# Patient Record
Sex: FEMALE | Race: WHITE | NOT HISPANIC OR LATINO | Employment: UNEMPLOYED | URBAN - NONMETROPOLITAN AREA
[De-identification: names, ages, dates, MRNs, and addresses within clinical notes are randomized per-mention and may not be internally consistent; named-entity substitution may affect disease eponyms.]

---

## 2021-08-16 ENCOUNTER — TRANSCRIBE ORDERS (OUTPATIENT)
Dept: ADMINISTRATIVE | Facility: HOSPITAL | Age: 56
End: 2021-08-16

## 2021-08-16 DIAGNOSIS — N17.9 ACUTE RENAL FAILURE, UNSPECIFIED ACUTE RENAL FAILURE TYPE (HCC): ICD-10-CM

## 2021-08-16 DIAGNOSIS — R60.1 ANASARCA: Primary | ICD-10-CM

## 2022-07-05 ENCOUNTER — APPOINTMENT (OUTPATIENT)
Dept: GENERAL RADIOLOGY | Facility: HOSPITAL | Age: 57
End: 2022-07-05

## 2022-11-10 ENCOUNTER — APPOINTMENT (OUTPATIENT)
Dept: CT IMAGING | Facility: HOSPITAL | Age: 57
End: 2022-11-10

## 2022-11-10 ENCOUNTER — APPOINTMENT (OUTPATIENT)
Dept: GENERAL RADIOLOGY | Facility: HOSPITAL | Age: 57
End: 2022-11-10

## 2022-11-10 ENCOUNTER — HOSPITAL ENCOUNTER (INPATIENT)
Facility: HOSPITAL | Age: 57
LOS: 8 days | Discharge: HOME HEALTH | End: 2022-11-18
Attending: STUDENT IN AN ORGANIZED HEALTH CARE EDUCATION/TRAINING PROGRAM | Admitting: HOSPITALIST

## 2022-11-10 DIAGNOSIS — M86.60 CHRONIC OSTEOMYELITIS: ICD-10-CM

## 2022-11-10 DIAGNOSIS — N18.9 ACUTE RENAL FAILURE WITH ACUTE TUBULAR NECROSIS SUPERIMPOSED ON CHRONIC KIDNEY DISEASE, UNSPECIFIED CKD STAGE: ICD-10-CM

## 2022-11-10 DIAGNOSIS — I89.0 CHRONIC ACQUIRED LYMPHEDEMA: ICD-10-CM

## 2022-11-10 DIAGNOSIS — N17.0 ACUTE RENAL FAILURE WITH ACUTE TUBULAR NECROSIS SUPERIMPOSED ON CHRONIC KIDNEY DISEASE, UNSPECIFIED CKD STAGE: ICD-10-CM

## 2022-11-10 DIAGNOSIS — N19 RENAL FAILURE, UNSPECIFIED CHRONICITY: Primary | ICD-10-CM

## 2022-11-10 PROBLEM — N17.9 ACUTE ON CHRONIC RENAL FAILURE (HCC): Status: ACTIVE | Noted: 2022-11-10

## 2022-11-10 LAB
ALBUMIN SERPL-MCNC: 2.68 G/DL (ref 3.5–5.2)
ALBUMIN/GLOB SERPL: 0.7 G/DL
ALP SERPL-CCNC: 134 U/L (ref 39–117)
ALT SERPL W P-5'-P-CCNC: 9 U/L (ref 1–33)
ANION GAP SERPL CALCULATED.3IONS-SCNC: 13.1 MMOL/L (ref 5–15)
APTT PPP: 33.4 SECONDS (ref 26.5–34.5)
AST SERPL-CCNC: 22 U/L (ref 1–32)
BASOPHILS # BLD MANUAL: 0.13 10*3/MM3 (ref 0–0.2)
BASOPHILS NFR BLD MANUAL: 1 % (ref 0–1.5)
BILIRUB SERPL-MCNC: 0.8 MG/DL (ref 0–1.2)
BILIRUB UR QL STRIP: NEGATIVE
BUN SERPL-MCNC: 67 MG/DL (ref 6–20)
BUN/CREAT SERPL: 15.2 (ref 7–25)
CALCIUM SPEC-SCNC: 9.1 MG/DL (ref 8.6–10.5)
CHLORIDE SERPL-SCNC: 94 MMOL/L (ref 98–107)
CLARITY UR: CLEAR
CO2 SERPL-SCNC: 25.9 MMOL/L (ref 22–29)
COLOR UR: YELLOW
CREAT SERPL-MCNC: 4.41 MG/DL (ref 0.57–1)
CRP SERPL-MCNC: 20.13 MG/DL (ref 0–0.5)
D-LACTATE SERPL-SCNC: 4.2 MMOL/L (ref 0.5–2)
D-LACTATE SERPL-SCNC: 5 MMOL/L (ref 0.5–2)
D-LACTATE SERPL-SCNC: 5.1 MMOL/L (ref 0.5–2)
DEPRECATED RDW RBC AUTO: 52.3 FL (ref 37–54)
EGFRCR SERPLBLD CKD-EPI 2021: 11.1 ML/MIN/1.73
ERYTHROCYTE [DISTWIDTH] IN BLOOD BY AUTOMATED COUNT: 14.6 % (ref 12.3–15.4)
FLUAV RNA RESP QL NAA+PROBE: NOT DETECTED
FLUBV RNA RESP QL NAA+PROBE: NOT DETECTED
GLOBULIN UR ELPH-MCNC: 4 GM/DL
GLUCOSE SERPL-MCNC: 114 MG/DL (ref 65–99)
GLUCOSE UR STRIP-MCNC: NEGATIVE MG/DL
HBA1C MFR BLD: 4.7 % (ref 4.8–5.6)
HCT VFR BLD AUTO: 29.4 % (ref 34–46.6)
HGB BLD-MCNC: 9.5 G/DL (ref 12–15.9)
HGB UR QL STRIP.AUTO: NEGATIVE
HOLD SPECIMEN: NORMAL
HOLD SPECIMEN: NORMAL
HYPOCHROMIA BLD QL: ABNORMAL
INR PPP: 1.12 (ref 0.9–1.1)
KETONES UR QL STRIP: NEGATIVE
LEUKOCYTE ESTERASE UR QL STRIP.AUTO: NEGATIVE
LIPASE SERPL-CCNC: 28 U/L (ref 13–60)
LYMPHOCYTES # BLD MANUAL: 0.4 10*3/MM3 (ref 0.7–3.1)
LYMPHOCYTES NFR BLD MANUAL: 5 % (ref 5–12)
MACROCYTES BLD QL SMEAR: ABNORMAL
MCH RBC QN AUTO: 31.6 PG (ref 26.6–33)
MCHC RBC AUTO-ENTMCNC: 32.3 G/DL (ref 31.5–35.7)
MCV RBC AUTO: 97.7 FL (ref 79–97)
METAMYELOCYTES NFR BLD MANUAL: 2 % (ref 0–0)
MONOCYTES # BLD: 0.67 10*3/MM3 (ref 0.1–0.9)
NEUTROPHILS # BLD AUTO: 11.88 10*3/MM3 (ref 1.7–7)
NEUTROPHILS NFR BLD MANUAL: 87 % (ref 42.7–76)
NEUTS BAND NFR BLD MANUAL: 2 % (ref 0–5)
NEUTS VAC BLD QL SMEAR: ABNORMAL
NITRITE UR QL STRIP: NEGATIVE
PH UR STRIP.AUTO: <=5 [PH] (ref 5–8)
PLAT MORPH BLD: NORMAL
PLATELET # BLD AUTO: 56 10*3/MM3 (ref 140–450)
PMV BLD AUTO: 10.2 FL (ref 6–12)
POTASSIUM SERPL-SCNC: 5.2 MMOL/L (ref 3.5–5.2)
PROCALCITONIN SERPL-MCNC: 1.13 NG/ML (ref 0–0.25)
PROT SERPL-MCNC: 6.7 G/DL (ref 6–8.5)
PROT UR QL STRIP: NEGATIVE
PROTHROMBIN TIME: 14.7 SECONDS (ref 12.1–14.7)
RBC # BLD AUTO: 3.01 10*6/MM3 (ref 3.77–5.28)
SARS-COV-2 RNA RESP QL NAA+PROBE: NOT DETECTED
SODIUM SERPL-SCNC: 133 MMOL/L (ref 136–145)
SP GR UR STRIP: 1.01 (ref 1–1.03)
TOXIC GRANULATION: ABNORMAL
TROPONIN T SERPL-MCNC: 0.02 NG/ML (ref 0–0.03)
TSH SERPL DL<=0.05 MIU/L-ACNC: 0.96 UIU/ML (ref 0.27–4.2)
UROBILINOGEN UR QL STRIP: NORMAL
VARIANT LYMPHS NFR BLD MANUAL: 3 % (ref 19.6–45.3)
WBC NRBC COR # BLD: 13.35 10*3/MM3 (ref 3.4–10.8)
WHOLE BLOOD HOLD COAG: NORMAL

## 2022-11-10 PROCEDURE — 84443 ASSAY THYROID STIM HORMONE: CPT | Performed by: HOSPITALIST

## 2022-11-10 PROCEDURE — 85610 PROTHROMBIN TIME: CPT | Performed by: PHYSICIAN ASSISTANT

## 2022-11-10 PROCEDURE — 25010000002 ONDANSETRON PER 1 MG: Performed by: EMERGENCY MEDICINE

## 2022-11-10 PROCEDURE — 99285 EMERGENCY DEPT VISIT HI MDM: CPT

## 2022-11-10 PROCEDURE — 80053 COMPREHEN METABOLIC PANEL: CPT | Performed by: PHYSICIAN ASSISTANT

## 2022-11-10 PROCEDURE — 73700 CT LOWER EXTREMITY W/O DYE: CPT

## 2022-11-10 PROCEDURE — 81003 URINALYSIS AUTO W/O SCOPE: CPT | Performed by: PHYSICIAN ASSISTANT

## 2022-11-10 PROCEDURE — 83605 ASSAY OF LACTIC ACID: CPT | Performed by: PHYSICIAN ASSISTANT

## 2022-11-10 PROCEDURE — 36415 COLL VENOUS BLD VENIPUNCTURE: CPT

## 2022-11-10 PROCEDURE — 85007 BL SMEAR W/DIFF WBC COUNT: CPT | Performed by: PHYSICIAN ASSISTANT

## 2022-11-10 PROCEDURE — 83036 HEMOGLOBIN GLYCOSYLATED A1C: CPT | Performed by: HOSPITALIST

## 2022-11-10 PROCEDURE — 25010000002 VANCOMYCIN 5 G RECONSTITUTED SOLUTION: Performed by: PHYSICIAN ASSISTANT

## 2022-11-10 PROCEDURE — P9612 CATHETERIZE FOR URINE SPEC: HCPCS

## 2022-11-10 PROCEDURE — 93010 ELECTROCARDIOGRAM REPORT: CPT | Performed by: INTERNAL MEDICINE

## 2022-11-10 PROCEDURE — 71250 CT THORAX DX C-: CPT

## 2022-11-10 PROCEDURE — 71045 X-RAY EXAM CHEST 1 VIEW: CPT

## 2022-11-10 PROCEDURE — 74176 CT ABD & PELVIS W/O CONTRAST: CPT

## 2022-11-10 PROCEDURE — 99223 1ST HOSP IP/OBS HIGH 75: CPT | Performed by: PHYSICIAN ASSISTANT

## 2022-11-10 PROCEDURE — 85025 COMPLETE CBC W/AUTO DIFF WBC: CPT | Performed by: PHYSICIAN ASSISTANT

## 2022-11-10 PROCEDURE — 25010000002 HYDROMORPHONE 1 MG/ML SOLUTION: Performed by: EMERGENCY MEDICINE

## 2022-11-10 PROCEDURE — 93005 ELECTROCARDIOGRAM TRACING: CPT | Performed by: PHYSICIAN ASSISTANT

## 2022-11-10 PROCEDURE — 84145 PROCALCITONIN (PCT): CPT | Performed by: PHYSICIAN ASSISTANT

## 2022-11-10 PROCEDURE — 83690 ASSAY OF LIPASE: CPT | Performed by: HOSPITALIST

## 2022-11-10 PROCEDURE — 25010000002 MEROPENEM PER 100 MG: Performed by: HOSPITALIST

## 2022-11-10 PROCEDURE — 87636 SARSCOV2 & INF A&B AMP PRB: CPT | Performed by: PHYSICIAN ASSISTANT

## 2022-11-10 PROCEDURE — 85730 THROMBOPLASTIN TIME PARTIAL: CPT | Performed by: PHYSICIAN ASSISTANT

## 2022-11-10 PROCEDURE — 84484 ASSAY OF TROPONIN QUANT: CPT | Performed by: PHYSICIAN ASSISTANT

## 2022-11-10 PROCEDURE — 86140 C-REACTIVE PROTEIN: CPT | Performed by: PHYSICIAN ASSISTANT

## 2022-11-10 PROCEDURE — 87040 BLOOD CULTURE FOR BACTERIA: CPT | Performed by: PHYSICIAN ASSISTANT

## 2022-11-10 PROCEDURE — 71045 X-RAY EXAM CHEST 1 VIEW: CPT | Performed by: RADIOLOGY

## 2022-11-10 RX ORDER — OXYCODONE AND ACETAMINOPHEN 10; 325 MG/1; MG/1
1 TABLET ORAL EVERY 6 HOURS PRN
Status: ON HOLD | COMMUNITY
End: 2022-11-11

## 2022-11-10 RX ORDER — SODIUM CHLORIDE 0.9 % (FLUSH) 0.9 %
10 SYRINGE (ML) INJECTION AS NEEDED
Status: DISCONTINUED | OUTPATIENT
Start: 2022-11-10 | End: 2022-11-18 | Stop reason: HOSPADM

## 2022-11-10 RX ORDER — HYDRALAZINE HYDROCHLORIDE 10 MG/1
10 TABLET, FILM COATED ORAL 3 TIMES DAILY
Status: ON HOLD | COMMUNITY
End: 2022-11-11

## 2022-11-10 RX ORDER — BUMETANIDE 2 MG/1
2 TABLET ORAL 2 TIMES DAILY
Status: ON HOLD | COMMUNITY
End: 2022-11-17 | Stop reason: SDUPTHER

## 2022-11-10 RX ORDER — SODIUM CHLORIDE 0.9 % (FLUSH) 0.9 %
10 SYRINGE (ML) INJECTION EVERY 12 HOURS SCHEDULED
Status: DISCONTINUED | OUTPATIENT
Start: 2022-11-10 | End: 2022-11-18 | Stop reason: HOSPADM

## 2022-11-10 RX ORDER — HEPARIN SODIUM 5000 [USP'U]/ML
5000 INJECTION, SOLUTION INTRAVENOUS; SUBCUTANEOUS EVERY 12 HOURS SCHEDULED
Status: DISCONTINUED | OUTPATIENT
Start: 2022-11-10 | End: 2022-11-18 | Stop reason: HOSPADM

## 2022-11-10 RX ORDER — LEVOTHYROXINE SODIUM 0.03 MG/1
25 TABLET ORAL DAILY
Status: ON HOLD | COMMUNITY
End: 2022-11-11

## 2022-11-10 RX ORDER — TRAZODONE HYDROCHLORIDE 50 MG/1
50 TABLET ORAL NIGHTLY PRN
COMMUNITY

## 2022-11-10 RX ORDER — METOPROLOL SUCCINATE 25 MG/1
25 TABLET, EXTENDED RELEASE ORAL DAILY
Status: ON HOLD | COMMUNITY
End: 2022-11-11

## 2022-11-10 RX ORDER — SODIUM CHLORIDE 9 MG/ML
125 INJECTION, SOLUTION INTRAVENOUS CONTINUOUS
Status: DISCONTINUED | OUTPATIENT
Start: 2022-11-10 | End: 2022-11-11

## 2022-11-10 RX ORDER — ONDANSETRON 2 MG/ML
4 INJECTION INTRAMUSCULAR; INTRAVENOUS ONCE
Status: COMPLETED | OUTPATIENT
Start: 2022-11-10 | End: 2022-11-10

## 2022-11-10 RX ORDER — NITROGLYCERIN 0.4 MG/1
0.4 TABLET SUBLINGUAL
Status: DISCONTINUED | OUTPATIENT
Start: 2022-11-10 | End: 2022-11-18 | Stop reason: HOSPADM

## 2022-11-10 RX ADMIN — VANCOMYCIN HYDROCHLORIDE 2500 MG: 5 INJECTION, POWDER, LYOPHILIZED, FOR SOLUTION INTRAVENOUS at 16:14

## 2022-11-10 RX ADMIN — MEROPENEM 1 G: 1 INJECTION, POWDER, FOR SOLUTION INTRAVENOUS at 20:08

## 2022-11-10 RX ADMIN — ONDANSETRON 4 MG: 2 INJECTION INTRAMUSCULAR; INTRAVENOUS at 16:41

## 2022-11-10 RX ADMIN — SODIUM CHLORIDE 2055 ML: 9 INJECTION, SOLUTION INTRAVENOUS at 18:34

## 2022-11-10 RX ADMIN — SODIUM CHLORIDE 2 G: 9 INJECTION, SOLUTION INTRAVENOUS at 18:44

## 2022-11-10 RX ADMIN — SODIUM CHLORIDE 125 ML/HR: 9 INJECTION, SOLUTION INTRAVENOUS at 20:08

## 2022-11-10 RX ADMIN — HYDROMORPHONE HYDROCHLORIDE 1 MG: 1 INJECTION, SOLUTION INTRAMUSCULAR; INTRAVENOUS; SUBCUTANEOUS at 16:41

## 2022-11-10 NOTE — ED PROVIDER NOTES
Subjective   History of Present Illness  Pt states pcp Dr Jacobson sent patient to hospital for Creatinine of 4.09   MRSA knee treated years ago   Pt is bed bound b/c she she lost her right knee joint due to MRSA   Pt takes care of multiple grand kids and they had a virus a couple of weeks ago   Pt had diarrhea over the weekend   Patient has a history of morbid obesity, COPD, hypertension, peripheral edema, hepatitis C.  Patient denies any rashes or decubitus ulcers.          History provided by:  Patient   used: No    Weakness - Generalized  Severity:  Moderate  Onset quality:  Sudden  Duration:  1 day  Timing:  Constant  Progression:  Worsening  Chronicity:  New  Relieved by:  None tried  Ineffective treatments:  None tried  Associated symptoms: no abdominal pain, no chest pain, no cough, no diarrhea, no dysuria, no fever, no headaches, no nausea, no shortness of breath and no vomiting        Review of Systems   Constitutional: Positive for activity change. Negative for chills and fever.   HENT: Negative.  Negative for congestion, ear pain and sore throat.    Respiratory: Negative.  Negative for cough, shortness of breath and wheezing.    Cardiovascular: Negative.  Negative for chest pain.   Gastrointestinal: Negative.  Negative for abdominal pain, diarrhea, nausea and vomiting.   Endocrine: Negative.    Genitourinary: Negative.  Negative for dysuria and flank pain.   Skin: Negative.  Negative for rash.   Neurological: Negative for headaches.   Psychiatric/Behavioral: Negative.  The patient is not nervous/anxious.    All other systems reviewed and are negative.      Past Medical History:   Diagnosis Date   • CHF (congestive heart failure) ([facility])    • COPD (chronic obstructive pulmonary disease) ([facility])    • Edema    • Hep C w/o coma, chronic ([facility])    • Hypertension    • Mitral valve prolapse        Allergies   Allergen Reactions   • Sulfa Antibiotics Rash   • Nsaids Swelling   • Penicillins Rash        Past Surgical History:   Procedure Laterality Date   • GASTRIC BANDING     • JOINT ASPIRATION AND/OR INJECTION Right 07/2022       Family History   Problem Relation Age of Onset   • Hypertension Mother    • Breast cancer Mother    • COPD Father        Social History     Socioeconomic History   • Marital status:    Tobacco Use   • Smoking status: Never   Vaping Use   • Vaping Use: Never used   Substance and Sexual Activity   • Alcohol use: Not Currently   • Drug use: Never   • Sexual activity: Defer           Objective   Physical Exam  Vitals and nursing note reviewed.   Constitutional:       Appearance: She is well-developed. She is obese.   HENT:      Head: Normocephalic.   Cardiovascular:      Rate and Rhythm: Normal rate and regular rhythm.   Pulmonary:      Effort: Pulmonary effort is normal.      Breath sounds: Normal breath sounds.   Abdominal:      General: Bowel sounds are normal.      Palpations: Abdomen is soft.      Tenderness: There is no abdominal tenderness.   Musculoskeletal:      Comments: Pt is non ambulatory/bed bound    Skin:     General: Skin is warm and dry.   Neurological:      Mental Status: She is alert and oriented to person, place, and time.   Psychiatric:         Behavior: Behavior normal.         Thought Content: Thought content normal.         Judgment: Judgment normal.         ECG 12 Lead Other; Sepsis      Date/Time: 11/10/2022 3:38 PM  Performed by: Joyce Lindo PA  Authorized by: Joyce Lindo PA                    ED Course  ED Course as of 11/12/22 0909   Thu Nov 10, 2022   1613 EKG at 1604 NSR 89 bpm, , cures 106, QTc 476, regular axis, no significant ST deviation, no STEMI.  Poor R wave progression. [KP]   1808 Discussed with Dr Butcher and states to hold bumex   For patient to get /hr after bolus  [LC]   1933 Discussed with Dr Licea will admit  [LC]      ED Course User Index  [KP] Carlos Manuel Mckeon MD  [LC] Joyce Lindo PA                                            MDM    Final diagnoses:   Renal failure, unspecified chronicity       ED Disposition  ED Disposition     ED Disposition   Decision to Admit    Condition   --    Comment   Level of Care: Progressive Care [20]   Diagnosis: Acute on chronic renal failure (HCC) [072154]   Admitting Physician: TRISTEN DIETRICH [[id number]]   Attending Physician: TRISTEN DIETRICH [[id number]]   Certification: I Certify That Inpatient Hospital Services Are Medically Necessary For Greater Than 2 Midnights               No follow-up provider specified.       Medication List      ASK your doctor about these medications    levothyroxine 100 MCG tablet  Commonly known as: SYNTHROID, LEVOTHROID  Ask about: Which instructions should I use?             Joyce Lindo PA  11/12/22 0967

## 2022-11-11 ENCOUNTER — APPOINTMENT (OUTPATIENT)
Dept: CARDIOLOGY | Facility: HOSPITAL | Age: 57
End: 2022-11-11

## 2022-11-11 ENCOUNTER — APPOINTMENT (OUTPATIENT)
Dept: ULTRASOUND IMAGING | Facility: HOSPITAL | Age: 57
End: 2022-11-11

## 2022-11-11 LAB
ADV 40+41 DNA STL QL NAA+NON-PROBE: NOT DETECTED
ALBUMIN SERPL-MCNC: 1.58 G/DL (ref 3.5–5.2)
ALBUMIN SERPL-MCNC: 2.11 G/DL (ref 3.5–5.2)
ALBUMIN/GLOB SERPL: 0.4 G/DL
ALBUMIN/GLOB SERPL: 0.5 G/DL
ALP SERPL-CCNC: 101 U/L (ref 39–117)
ALP SERPL-CCNC: 116 U/L (ref 39–117)
ALT SERPL W P-5'-P-CCNC: 10 U/L (ref 1–33)
ALT SERPL W P-5'-P-CCNC: 11 U/L (ref 1–33)
AMPHET+METHAMPHET UR QL: NEGATIVE
AMPHETAMINES UR QL: NEGATIVE
ANION GAP SERPL CALCULATED.3IONS-SCNC: 15.8 MMOL/L (ref 5–15)
ANION GAP SERPL CALCULATED.3IONS-SCNC: 16 MMOL/L (ref 5–15)
AST SERPL-CCNC: 25 U/L (ref 1–32)
AST SERPL-CCNC: 34 U/L (ref 1–32)
ASTRO TYP 1-8 RNA STL QL NAA+NON-PROBE: NOT DETECTED
BACTERIA UR QL AUTO: NORMAL /HPF
BARBITURATES UR QL SCN: NEGATIVE
BASOPHILS # BLD AUTO: 0.03 10*3/MM3 (ref 0–0.2)
BASOPHILS # BLD AUTO: 0.04 10*3/MM3 (ref 0–0.2)
BASOPHILS NFR BLD AUTO: 0.3 % (ref 0–1.5)
BASOPHILS NFR BLD AUTO: 0.4 % (ref 0–1.5)
BENZODIAZ UR QL SCN: NEGATIVE
BH CV ECHO MEAS - ACS: 1.9 CM
BH CV ECHO MEAS - AO MAX PG: 25.9 MMHG
BH CV ECHO MEAS - AO MEAN PG: 15.5 MMHG
BH CV ECHO MEAS - AO ROOT DIAM: 3.1 CM
BH CV ECHO MEAS - AO V2 MAX: 254.5 CM/SEC
BH CV ECHO MEAS - AO V2 VTI: 48.7 CM
BH CV ECHO MEAS - AVA(I,D): 2.12 CM2
BH CV ECHO MEAS - EDV(CUBED): 117.6 ML
BH CV ECHO MEAS - EDV(MOD-SP4): 42.9 ML
BH CV ECHO MEAS - EF(MOD-SP4): 73.4 %
BH CV ECHO MEAS - ESV(CUBED): 27 ML
BH CV ECHO MEAS - ESV(MOD-SP4): 11.4 ML
BH CV ECHO MEAS - FS: 38.8 %
BH CV ECHO MEAS - IVS/LVPW: 0.5 CM
BH CV ECHO MEAS - IVSD: 0.9 CM
BH CV ECHO MEAS - LA DIMENSION: 2.4 CM
BH CV ECHO MEAS - LAT PEAK E' VEL: 7.9 CM/SEC
BH CV ECHO MEAS - LV DIASTOLIC VOL/BSA (35-75): 12.7 CM2
BH CV ECHO MEAS - LV MASS(C)D: 267.9 GRAMS
BH CV ECHO MEAS - LV MAX PG: 13.5 MMHG
BH CV ECHO MEAS - LV MEAN PG: 7 MMHG
BH CV ECHO MEAS - LV SYSTOLIC VOL/BSA (12-30): 3.4 CM2
BH CV ECHO MEAS - LV V1 MAX: 184 CM/SEC
BH CV ECHO MEAS - LV V1 VTI: 32.9 CM
BH CV ECHO MEAS - LVIDD: 4.9 CM
BH CV ECHO MEAS - LVIDS: 3 CM
BH CV ECHO MEAS - LVOT AREA: 3.1 CM2
BH CV ECHO MEAS - LVOT DIAM: 2 CM
BH CV ECHO MEAS - LVPWD: 1.8 CM
BH CV ECHO MEAS - MED PEAK E' VEL: 6 CM/SEC
BH CV ECHO MEAS - MV A MAX VEL: 99.1 CM/SEC
BH CV ECHO MEAS - MV E MAX VEL: 79.4 CM/SEC
BH CV ECHO MEAS - MV E/A: 0.8
BH CV ECHO MEAS - RAP SYSTOLE: 10 MMHG
BH CV ECHO MEAS - RVSP: 39.2 MMHG
BH CV ECHO MEAS - SI(MOD-SP4): 9.3 ML/M2
BH CV ECHO MEAS - SV(LVOT): 103.4 ML
BH CV ECHO MEAS - SV(MOD-SP4): 31.5 ML
BH CV ECHO MEAS - TAPSE (>1.6): 2.17 CM
BH CV ECHO MEAS - TR MAX PG: 29.2 MMHG
BH CV ECHO MEAS - TR MAX VEL: 270 CM/SEC
BH CV ECHO MEASUREMENTS AVERAGE E/E' RATIO: 11.42
BILIRUB SERPL-MCNC: 0.7 MG/DL (ref 0–1.2)
BILIRUB SERPL-MCNC: 0.7 MG/DL (ref 0–1.2)
BILIRUB UR QL STRIP: NEGATIVE
BUN SERPL-MCNC: 68 MG/DL (ref 6–20)
BUN SERPL-MCNC: 69 MG/DL (ref 6–20)
BUN/CREAT SERPL: 15 (ref 7–25)
BUN/CREAT SERPL: 16 (ref 7–25)
BUPRENORPHINE SERPL-MCNC: NEGATIVE NG/ML
C CAYETANENSIS DNA STL QL NAA+NON-PROBE: NOT DETECTED
C COLI+JEJ+UPSA DNA STL QL NAA+NON-PROBE: NOT DETECTED
CALCIUM SPEC-SCNC: 8.5 MG/DL (ref 8.6–10.5)
CALCIUM SPEC-SCNC: 8.8 MG/DL (ref 8.6–10.5)
CANNABINOIDS SERPL QL: NEGATIVE
CHLORIDE SERPL-SCNC: 95 MMOL/L (ref 98–107)
CHLORIDE SERPL-SCNC: 96 MMOL/L (ref 98–107)
CLARITY UR: CLEAR
CO2 SERPL-SCNC: 18.2 MMOL/L (ref 22–29)
CO2 SERPL-SCNC: 21 MMOL/L (ref 22–29)
COCAINE UR QL: NEGATIVE
COLOR UR: YELLOW
CREAT SERPL-MCNC: 4.3 MG/DL (ref 0.57–1)
CREAT SERPL-MCNC: 4.53 MG/DL (ref 0.57–1)
CRP SERPL-MCNC: 17.21 MG/DL (ref 0–0.5)
CRYPTOSP DNA STL QL NAA+NON-PROBE: NOT DETECTED
CYTOLOGIST CVX/VAG CYTO: NORMAL
D-LACTATE SERPL-SCNC: 3.9 MMOL/L (ref 0.5–2)
D-LACTATE SERPL-SCNC: 4.6 MMOL/L (ref 0.5–2)
D-LACTATE SERPL-SCNC: 5 MMOL/L (ref 0.5–2)
DEPRECATED RDW RBC AUTO: 53.1 FL (ref 37–54)
DEPRECATED RDW RBC AUTO: 54.7 FL (ref 37–54)
E HISTOLYT DNA STL QL NAA+NON-PROBE: NOT DETECTED
EAEC PAA PLAS AGGR+AATA ST NAA+NON-PRB: NOT DETECTED
EC STX1+STX2 GENES STL QL NAA+NON-PROBE: NOT DETECTED
EGFRCR SERPLBLD CKD-EPI 2021: 10.7 ML/MIN/1.73
EGFRCR SERPLBLD CKD-EPI 2021: 11.4 ML/MIN/1.73
EOSINOPHIL # BLD AUTO: 0.01 10*3/MM3 (ref 0–0.4)
EOSINOPHIL # BLD AUTO: 0.01 10*3/MM3 (ref 0–0.4)
EOSINOPHIL NFR BLD AUTO: 0.1 % (ref 0.3–6.2)
EOSINOPHIL NFR BLD AUTO: 0.1 % (ref 0.3–6.2)
EPEC EAE GENE STL QL NAA+NON-PROBE: NOT DETECTED
ERYTHROCYTE [DISTWIDTH] IN BLOOD BY AUTOMATED COUNT: 14.6 % (ref 12.3–15.4)
ERYTHROCYTE [DISTWIDTH] IN BLOOD BY AUTOMATED COUNT: 14.9 % (ref 12.3–15.4)
ETEC LTA+ST1A+ST1B TOX ST NAA+NON-PROBE: NOT DETECTED
FOLATE SERPL-MCNC: 4.94 NG/ML (ref 4.78–24.2)
G LAMBLIA DNA STL QL NAA+NON-PROBE: NOT DETECTED
GLOBULIN UR ELPH-MCNC: 4.1 GM/DL
GLOBULIN UR ELPH-MCNC: 4.3 GM/DL
GLUCOSE SERPL-MCNC: 112 MG/DL (ref 65–99)
GLUCOSE SERPL-MCNC: 125 MG/DL (ref 65–99)
GLUCOSE UR STRIP-MCNC: NEGATIVE MG/DL
HAPTOGLOB SERPL-MCNC: 123 MG/DL (ref 30–200)
HCT VFR BLD AUTO: 27.3 % (ref 34–46.6)
HCT VFR BLD AUTO: 28.1 % (ref 34–46.6)
HGB BLD-MCNC: 8.7 G/DL (ref 12–15.9)
HGB BLD-MCNC: 9 G/DL (ref 12–15.9)
HGB UR QL STRIP.AUTO: NEGATIVE
HYALINE CASTS UR QL AUTO: NORMAL /LPF
HYPOCHROMIA BLD QL: NORMAL
HYPOCHROMIA BLD QL: NORMAL
IMM GRANULOCYTES # BLD AUTO: 0.28 10*3/MM3 (ref 0–0.05)
IMM GRANULOCYTES # BLD AUTO: 0.46 10*3/MM3 (ref 0–0.05)
IMM GRANULOCYTES NFR BLD AUTO: 2.9 % (ref 0–0.5)
IMM GRANULOCYTES NFR BLD AUTO: 4.2 % (ref 0–0.5)
IRON 24H UR-MRATE: 51 MCG/DL (ref 37–145)
IRON SATN MFR SERPL: 33 % (ref 20–50)
KETONES UR QL STRIP: NEGATIVE
LDH SERPL-CCNC: 244 U/L (ref 135–214)
LEFT ATRIUM VOLUME INDEX: 11.5 ML/M2
LEUKOCYTE ESTERASE UR QL STRIP.AUTO: NEGATIVE
LYMPHOCYTES # BLD AUTO: 0.68 10*3/MM3 (ref 0.7–3.1)
LYMPHOCYTES # BLD AUTO: 0.86 10*3/MM3 (ref 0.7–3.1)
LYMPHOCYTES NFR BLD AUTO: 6.2 % (ref 19.6–45.3)
LYMPHOCYTES NFR BLD AUTO: 8.9 % (ref 19.6–45.3)
MACROCYTES BLD QL SMEAR: NORMAL
MACROCYTES BLD QL SMEAR: NORMAL
MAXIMAL PREDICTED HEART RATE: 163 BPM
MCH RBC QN AUTO: 31.8 PG (ref 26.6–33)
MCH RBC QN AUTO: 32.3 PG (ref 26.6–33)
MCHC RBC AUTO-ENTMCNC: 31.9 G/DL (ref 31.5–35.7)
MCHC RBC AUTO-ENTMCNC: 32 G/DL (ref 31.5–35.7)
MCV RBC AUTO: 101.5 FL (ref 79–97)
MCV RBC AUTO: 99.3 FL (ref 79–97)
METHADONE UR QL SCN: NEGATIVE
MONOCYTES # BLD AUTO: 0.95 10*3/MM3 (ref 0.1–0.9)
MONOCYTES # BLD AUTO: 1.19 10*3/MM3 (ref 0.1–0.9)
MONOCYTES NFR BLD AUTO: 10.9 % (ref 5–12)
MONOCYTES NFR BLD AUTO: 9.8 % (ref 5–12)
NEUTROPHILS NFR BLD AUTO: 7.56 10*3/MM3 (ref 1.7–7)
NEUTROPHILS NFR BLD AUTO: 77.9 % (ref 42.7–76)
NEUTROPHILS NFR BLD AUTO: 78.3 % (ref 42.7–76)
NEUTROPHILS NFR BLD AUTO: 8.56 10*3/MM3 (ref 1.7–7)
NEUTS VAC BLD QL SMEAR: NORMAL
NITRITE UR QL STRIP: NEGATIVE
NOROVIRUS GI+II RNA STL QL NAA+NON-PROBE: NOT DETECTED
NRBC BLD AUTO-RTO: 0 /100 WBC (ref 0–0.2)
NRBC BLD AUTO-RTO: 0 /100 WBC (ref 0–0.2)
OPIATES UR QL: POSITIVE
OXYCODONE UR QL SCN: POSITIVE
P SHIGELLOIDES DNA STL QL NAA+NON-PROBE: NOT DETECTED
PATH INTERP BLD-IMP: NORMAL
PCP UR QL SCN: NEGATIVE
PH UR STRIP.AUTO: 5.5 [PH] (ref 5–8)
PLATELET # BLD AUTO: 43 10*3/MM3 (ref 140–450)
PLATELET # BLD AUTO: 47 10*3/MM3 (ref 140–450)
PMV BLD AUTO: 10.1 FL (ref 6–12)
PMV BLD AUTO: 9.8 FL (ref 6–12)
POTASSIUM SERPL-SCNC: 4.4 MMOL/L (ref 3.5–5.2)
POTASSIUM SERPL-SCNC: 5.6 MMOL/L (ref 3.5–5.2)
PROCALCITONIN SERPL-MCNC: 0.87 NG/ML (ref 0–0.25)
PROPOXYPH UR QL: NEGATIVE
PROT SERPL-MCNC: 5.9 G/DL (ref 6–8.5)
PROT SERPL-MCNC: 6.2 G/DL (ref 6–8.5)
PROT UR QL STRIP: NEGATIVE
RBC # BLD AUTO: 2.69 10*6/MM3 (ref 3.77–5.28)
RBC # BLD AUTO: 2.83 10*6/MM3 (ref 3.77–5.28)
RBC # UR STRIP: NORMAL /HPF
REF LAB TEST METHOD: NORMAL
RVA RNA STL QL NAA+NON-PROBE: NOT DETECTED
S ENT+BONG DNA STL QL NAA+NON-PROBE: NOT DETECTED
SAPO I+II+IV+V RNA STL QL NAA+NON-PROBE: NOT DETECTED
SHIGELLA SP+EIEC IPAH ST NAA+NON-PROBE: NOT DETECTED
SMALL PLATELETS BLD QL SMEAR: NORMAL
SMALL PLATELETS BLD QL SMEAR: NORMAL
SODIUM SERPL-SCNC: 130 MMOL/L (ref 136–145)
SODIUM SERPL-SCNC: 132 MMOL/L (ref 136–145)
SP GR UR STRIP: 1.01 (ref 1–1.03)
SQUAMOUS #/AREA URNS HPF: NORMAL /HPF
STRESS TARGET HR: 139 BPM
TIBC SERPL-MCNC: 155 MCG/DL (ref 298–536)
TOXIC GRANULATION: NORMAL
TOXIC GRANULATION: NORMAL
TRANSFERRIN SERPL-MCNC: 104 MG/DL (ref 200–360)
TRICYCLICS UR QL SCN: NEGATIVE
UROBILINOGEN UR QL STRIP: NORMAL
V CHOL+PARA+VUL DNA STL QL NAA+NON-PROBE: NOT DETECTED
V CHOLERAE DNA STL QL NAA+NON-PROBE: NOT DETECTED
VANCOMYCIN SERPL-MCNC: 11.7 MCG/ML (ref 5–40)
VANCOMYCIN SERPL-MCNC: 15.4 MCG/ML (ref 5–40)
VIT B12 BLD-MCNC: 1932 PG/ML (ref 211–946)
WBC # UR STRIP: NORMAL /HPF
WBC NRBC COR # BLD: 10.93 10*3/MM3 (ref 3.4–10.8)
WBC NRBC COR # BLD: 9.7 10*3/MM3 (ref 3.4–10.8)
Y ENTEROCOL DNA STL QL NAA+NON-PROBE: NOT DETECTED

## 2022-11-11 PROCEDURE — 80202 ASSAY OF VANCOMYCIN: CPT

## 2022-11-11 PROCEDURE — 86140 C-REACTIVE PROTEIN: CPT | Performed by: HOSPITALIST

## 2022-11-11 PROCEDURE — 93971 EXTREMITY STUDY: CPT

## 2022-11-11 PROCEDURE — 25010000002 VANCOMYCIN 1 G RECONSTITUTED SOLUTION 1 EACH VIAL: Performed by: HOSPITALIST

## 2022-11-11 PROCEDURE — 85025 COMPLETE CBC W/AUTO DIFF WBC: CPT | Performed by: HOSPITALIST

## 2022-11-11 PROCEDURE — 82746 ASSAY OF FOLIC ACID SERUM: CPT | Performed by: PHYSICIAN ASSISTANT

## 2022-11-11 PROCEDURE — 83605 ASSAY OF LACTIC ACID: CPT | Performed by: PHYSICIAN ASSISTANT

## 2022-11-11 PROCEDURE — 81001 URINALYSIS AUTO W/SCOPE: CPT | Performed by: INTERNAL MEDICINE

## 2022-11-11 PROCEDURE — 83615 LACTATE (LD) (LDH) ENZYME: CPT | Performed by: INTERNAL MEDICINE

## 2022-11-11 PROCEDURE — 83540 ASSAY OF IRON: CPT | Performed by: PHYSICIAN ASSISTANT

## 2022-11-11 PROCEDURE — 99232 SBSQ HOSP IP/OBS MODERATE 35: CPT | Performed by: STUDENT IN AN ORGANIZED HEALTH CARE EDUCATION/TRAINING PROGRAM

## 2022-11-11 PROCEDURE — 80306 DRUG TEST PRSMV INSTRMNT: CPT | Performed by: HOSPITALIST

## 2022-11-11 PROCEDURE — 85007 BL SMEAR W/DIFF WBC COUNT: CPT | Performed by: HOSPITALIST

## 2022-11-11 PROCEDURE — 93306 TTE W/DOPPLER COMPLETE: CPT

## 2022-11-11 PROCEDURE — 80053 COMPREHEN METABOLIC PANEL: CPT | Performed by: HOSPITALIST

## 2022-11-11 PROCEDURE — 99221 1ST HOSP IP/OBS SF/LOW 40: CPT | Performed by: SURGERY

## 2022-11-11 PROCEDURE — 25010000002 MORPHINE PER 10 MG: Performed by: HOSPITALIST

## 2022-11-11 PROCEDURE — 99223 1ST HOSP IP/OBS HIGH 75: CPT | Performed by: INTERNAL MEDICINE

## 2022-11-11 PROCEDURE — 25010000002 HEPARIN (PORCINE) PER 1000 UNITS: Performed by: HOSPITALIST

## 2022-11-11 PROCEDURE — 85060 BLOOD SMEAR INTERPRETATION: CPT | Performed by: STUDENT IN AN ORGANIZED HEALTH CARE EDUCATION/TRAINING PROGRAM

## 2022-11-11 PROCEDURE — 83010 ASSAY OF HAPTOGLOBIN QUANT: CPT | Performed by: INTERNAL MEDICINE

## 2022-11-11 PROCEDURE — 93306 TTE W/DOPPLER COMPLETE: CPT | Performed by: INTERNAL MEDICINE

## 2022-11-11 PROCEDURE — 93971 EXTREMITY STUDY: CPT | Performed by: RADIOLOGY

## 2022-11-11 PROCEDURE — 82607 VITAMIN B-12: CPT | Performed by: PHYSICIAN ASSISTANT

## 2022-11-11 PROCEDURE — 87507 IADNA-DNA/RNA PROBE TQ 12-25: CPT | Performed by: INTERNAL MEDICINE

## 2022-11-11 PROCEDURE — 84466 ASSAY OF TRANSFERRIN: CPT | Performed by: PHYSICIAN ASSISTANT

## 2022-11-11 PROCEDURE — 94799 UNLISTED PULMONARY SVC/PX: CPT

## 2022-11-11 PROCEDURE — 84145 PROCALCITONIN (PCT): CPT | Performed by: HOSPITALIST

## 2022-11-11 RX ORDER — OXYCODONE HYDROCHLORIDE 5 MG/1
5 TABLET ORAL 2 TIMES DAILY PRN
COMMUNITY

## 2022-11-11 RX ORDER — NYSTATIN 100000 U/G
1 CREAM TOPICAL 2 TIMES DAILY
Status: DISCONTINUED | OUTPATIENT
Start: 2022-11-11 | End: 2022-11-18 | Stop reason: HOSPADM

## 2022-11-11 RX ORDER — OXYCODONE HYDROCHLORIDE 5 MG/1
5 TABLET ORAL 2 TIMES DAILY PRN
Status: DISCONTINUED | OUTPATIENT
Start: 2022-11-11 | End: 2022-11-16

## 2022-11-11 RX ORDER — HYDROXYZINE 50 MG/1
50 TABLET, FILM COATED ORAL 3 TIMES DAILY PRN
Status: DISCONTINUED | OUTPATIENT
Start: 2022-11-11 | End: 2022-11-18 | Stop reason: HOSPADM

## 2022-11-11 RX ORDER — OXYCODONE HYDROCHLORIDE 15 MG/1
15 TABLET ORAL 4 TIMES DAILY
Status: DISCONTINUED | OUTPATIENT
Start: 2022-11-11 | End: 2022-11-18 | Stop reason: HOSPADM

## 2022-11-11 RX ORDER — CETIRIZINE HYDROCHLORIDE 10 MG/1
10 TABLET ORAL 2 TIMES DAILY PRN
Status: DISCONTINUED | OUTPATIENT
Start: 2022-11-11 | End: 2022-11-18 | Stop reason: HOSPADM

## 2022-11-11 RX ORDER — BUMETANIDE 1 MG/1
2 TABLET ORAL 2 TIMES DAILY
Status: CANCELLED | OUTPATIENT
Start: 2022-11-11

## 2022-11-11 RX ORDER — PANTOPRAZOLE SODIUM 40 MG/1
40 TABLET, DELAYED RELEASE ORAL EVERY MORNING
Status: DISCONTINUED | OUTPATIENT
Start: 2022-11-12 | End: 2022-11-18 | Stop reason: HOSPADM

## 2022-11-11 RX ORDER — MORPHINE SULFATE 2 MG/ML
2 INJECTION, SOLUTION INTRAMUSCULAR; INTRAVENOUS EVERY 4 HOURS PRN
Status: DISCONTINUED | OUTPATIENT
Start: 2022-11-11 | End: 2022-11-18 | Stop reason: HOSPADM

## 2022-11-11 RX ORDER — DOCUSATE SODIUM 100 MG/1
100 CAPSULE, LIQUID FILLED ORAL 2 TIMES DAILY PRN
Status: DISCONTINUED | OUTPATIENT
Start: 2022-11-11 | End: 2022-11-18 | Stop reason: HOSPADM

## 2022-11-11 RX ORDER — DIPHENHYDRAMINE HCL 25 MG
25 CAPSULE ORAL NIGHTLY PRN
COMMUNITY

## 2022-11-11 RX ORDER — METOPROLOL TARTRATE 50 MG/1
12.5 TABLET, FILM COATED ORAL 2 TIMES DAILY PRN
COMMUNITY

## 2022-11-11 RX ORDER — DOCUSATE SODIUM 100 MG/1
100 CAPSULE, LIQUID FILLED ORAL 2 TIMES DAILY PRN
COMMUNITY

## 2022-11-11 RX ORDER — CETIRIZINE HYDROCHLORIDE 10 MG/1
10 TABLET ORAL 2 TIMES DAILY PRN
COMMUNITY

## 2022-11-11 RX ORDER — LEVOTHYROXINE SODIUM 0.1 MG/1
100 TABLET ORAL DAILY
COMMUNITY

## 2022-11-11 RX ORDER — OMEPRAZOLE 40 MG/1
40 CAPSULE, DELAYED RELEASE ORAL DAILY
COMMUNITY

## 2022-11-11 RX ORDER — NYSTATIN 100000 [USP'U]/G
1 POWDER TOPICAL 2 TIMES DAILY PRN
COMMUNITY

## 2022-11-11 RX ORDER — FLUTICASONE PROPIONATE 50 MCG
2 SPRAY, SUSPENSION (ML) NASAL DAILY
Status: DISCONTINUED | OUTPATIENT
Start: 2022-11-11 | End: 2022-11-18 | Stop reason: HOSPADM

## 2022-11-11 RX ORDER — LEVOTHYROXINE SODIUM 0.1 MG/1
100 TABLET ORAL DAILY
Status: DISCONTINUED | OUTPATIENT
Start: 2022-11-11 | End: 2022-11-18 | Stop reason: HOSPADM

## 2022-11-11 RX ORDER — FLUTICASONE PROPIONATE 50 MCG
2 SPRAY, SUSPENSION (ML) NASAL DAILY
COMMUNITY

## 2022-11-11 RX ORDER — TRAZODONE HYDROCHLORIDE 50 MG/1
50 TABLET ORAL NIGHTLY PRN
Status: DISCONTINUED | OUTPATIENT
Start: 2022-11-11 | End: 2022-11-18 | Stop reason: HOSPADM

## 2022-11-11 RX ORDER — DIPHENHYDRAMINE HCL 25 MG
25 CAPSULE ORAL NIGHTLY PRN
Status: DISCONTINUED | OUTPATIENT
Start: 2022-11-11 | End: 2022-11-18 | Stop reason: HOSPADM

## 2022-11-11 RX ORDER — SODIUM CHLORIDE, SODIUM LACTATE, POTASSIUM CHLORIDE, CALCIUM CHLORIDE 600; 310; 30; 20 MG/100ML; MG/100ML; MG/100ML; MG/100ML
50 INJECTION, SOLUTION INTRAVENOUS CONTINUOUS
Status: DISCONTINUED | OUTPATIENT
Start: 2022-11-11 | End: 2022-11-14

## 2022-11-11 RX ORDER — NYSTATIN 100000 [USP'U]/G
1 POWDER TOPICAL 2 TIMES DAILY PRN
Status: DISCONTINUED | OUTPATIENT
Start: 2022-11-11 | End: 2022-11-18 | Stop reason: HOSPADM

## 2022-11-11 RX ORDER — OXYCODONE HYDROCHLORIDE 15 MG/1
15 TABLET ORAL 4 TIMES DAILY
COMMUNITY

## 2022-11-11 RX ORDER — NYSTATIN 100000 U/G
1 CREAM TOPICAL 2 TIMES DAILY
COMMUNITY

## 2022-11-11 RX ORDER — SODIUM CHLORIDE, SODIUM LACTATE, POTASSIUM CHLORIDE, CALCIUM CHLORIDE 600; 310; 30; 20 MG/100ML; MG/100ML; MG/100ML; MG/100ML
75 INJECTION, SOLUTION INTRAVENOUS CONTINUOUS
Status: ACTIVE | OUTPATIENT
Start: 2022-11-11 | End: 2022-11-11

## 2022-11-11 RX ORDER — ZINC OXIDE 20 %
OINTMENT (GRAM) TOPICAL 2 TIMES DAILY
Status: DISCONTINUED | OUTPATIENT
Start: 2022-11-11 | End: 2022-11-18 | Stop reason: HOSPADM

## 2022-11-11 RX ORDER — HYDROXYZINE 50 MG/1
50 TABLET, FILM COATED ORAL 3 TIMES DAILY PRN
COMMUNITY

## 2022-11-11 RX ADMIN — HEPARIN SODIUM 5000 UNITS: 5000 INJECTION INTRAVENOUS; SUBCUTANEOUS at 08:54

## 2022-11-11 RX ADMIN — OXYCODONE HYDROCHLORIDE 15 MG: 15 TABLET ORAL at 20:21

## 2022-11-11 RX ADMIN — Medication 10 ML: at 20:23

## 2022-11-11 RX ADMIN — MORPHINE SULFATE 2 MG: 2 INJECTION, SOLUTION INTRAMUSCULAR; INTRAVENOUS at 13:07

## 2022-11-11 RX ADMIN — SODIUM CHLORIDE, POTASSIUM CHLORIDE, SODIUM LACTATE AND CALCIUM CHLORIDE 75 ML/HR: 600; 310; 30; 20 INJECTION, SOLUTION INTRAVENOUS at 13:11

## 2022-11-11 RX ADMIN — HEPARIN SODIUM 5000 UNITS: 5000 INJECTION INTRAVENOUS; SUBCUTANEOUS at 00:14

## 2022-11-11 RX ADMIN — MORPHINE SULFATE 2 MG: 2 INJECTION, SOLUTION INTRAMUSCULAR; INTRAVENOUS at 09:04

## 2022-11-11 RX ADMIN — VANCOMYCIN HYDROCHLORIDE 1000 MG: 1 INJECTION, POWDER, LYOPHILIZED, FOR SOLUTION INTRAVENOUS at 18:13

## 2022-11-11 RX ADMIN — Medication 10 ML: at 00:15

## 2022-11-11 RX ADMIN — SODIUM CHLORIDE 125 ML/HR: 9 INJECTION, SOLUTION INTRAVENOUS at 03:35

## 2022-11-11 RX ADMIN — FLUTICASONE PROPIONATE 2 SPRAY: 50 SPRAY, METERED NASAL at 20:21

## 2022-11-11 RX ADMIN — MORPHINE SULFATE 2 MG: 2 INJECTION, SOLUTION INTRAMUSCULAR; INTRAVENOUS at 18:13

## 2022-11-11 RX ADMIN — MORPHINE SULFATE 2 MG: 2 INJECTION, SOLUTION INTRAMUSCULAR; INTRAVENOUS at 03:35

## 2022-11-11 RX ADMIN — ZINC OXIDE: 200 OINTMENT TOPICAL at 20:22

## 2022-11-11 RX ADMIN — NYSTATIN 1 APPLICATION: 100000 CREAM TOPICAL at 20:22

## 2022-11-11 RX ADMIN — HEPARIN SODIUM 5000 UNITS: 5000 INJECTION INTRAVENOUS; SUBCUTANEOUS at 20:26

## 2022-11-11 RX ADMIN — LEVOTHYROXINE SODIUM 100 MCG: 100 TABLET ORAL at 20:21

## 2022-11-11 RX ADMIN — METOPROLOL TARTRATE 12.5 MG: 25 TABLET, FILM COATED ORAL at 20:29

## 2022-11-11 RX ADMIN — Medication 10 ML: at 08:54

## 2022-11-11 NOTE — CONSULTS
Nephrology Consult Note    Referring Provider: Dr. Licea  Reason for Consultation: SUZETTE    Subjective       History of present illness:  Malina Velasquez is a 57 y.o. female who presented to [facility] emergency department with chief complaint of not feeling well and was told that she has low kidney functions. Pt is known to have CHF, essential hypertension, history of MVP, COPD, chronic hepatitis C, and former tobacco abuse. She has had extensive history of arthritis, septic arthritis and multiple joint procedures and IV antibiotics recently. Pt stated she has diarrhea about wk ago with very poor oral  Intake and persistent nausea.   Pt also has been taking ibuprufen 1-2 times daily for past many wks. She is not aware if she had any kdiney issues, her baseline Cr  Is unknown and was admitted with 4.5. Pt denied any chest pain or shortness of breath.  Patient denies hematuria, dysuria, difficulty passing urine. No prior history of renal stones. No family history of renal disease    History  Past Medical History:   Diagnosis Date   • CHF (congestive heart failure) (HCC)    • COPD (chronic obstructive pulmonary disease) (HCC)    • Edema    • Hep C w/o coma, chronic (HCC)    • Hypertension    • Mitral valve prolapse    ,   Past Surgical History:   Procedure Laterality Date   • GASTRIC BANDING     • JOINT ASPIRATION AND/OR INJECTION Right 07/2022   ,   Family History   Problem Relation Age of Onset   • Hypertension Mother    • Breast cancer Mother    • COPD Father    ,   Social History     Tobacco Use   • Smoking status: Never   Vaping Use   • Vaping Use: Never used   Substance Use Topics   • Alcohol use: Not Currently   • Drug use: Never   ,   Medications Prior to Admission   Medication Sig Dispense Refill Last Dose   • bumetanide (BUMEX) 2 MG tablet Take 1 tablet by mouth 2 (Two) Times a Day.   11/10/2022   • cetirizine (zyrTEC) 10 MG tablet Take 1 tablet by mouth 2 (Two) Times a Day As Needed for  Allergies.   Past Week   • diphenhydrAMINE (BENADRYL) 25 mg capsule Take 1 capsule by mouth At Night As Needed for Sleep. Prior to [facility] Admission, Patient was on:  1 to 2 caps at bedtime as needed   Past Week   • docusate sodium (COLACE) 100 MG capsule Take 1 capsule by mouth 2 (Two) Times a Day As Needed for Constipation.   Past Week   • fluticasone (FLONASE) 50 MCG/ACT nasal spray 2 sprays into the nostril(s) as directed by provider Daily.   11/10/2022   • hydrOXYzine (ATARAX) 50 MG tablet Take 1 tablet by mouth 3 (Three) Times a Day As Needed for Anxiety.   Past Week   • levothyroxine (SYNTHROID, LEVOTHROID) 100 MCG tablet Take 1 tablet by mouth Daily.   11/10/2022   • metoprolol tartrate (LOPRESSOR) 50 MG tablet Take 12.5 mg by mouth 2 (Two) Times a Day As Needed. Prior to [facility] Admission, Patient was on:  if heart rate greater than 100   Past Month   • nystatin (MYCOSTATIN) 454485 UNIT/GM cream Apply 1 application topically to the appropriate area as directed 2 (Two) Times a Day. Prior to [facility] Admission, Patient was on:  Mixes with Zinc oxide, uses on stomach folds   11/10/2022   • nystatin (MYCOSTATIN) 610437 UNIT/GM powder Apply 1 application topically to the appropriate area as directed 2 (Two) Times a Day As Needed. Prior to [facility] Admission, Patient was on:  stomach folds   11/10/2022   • omeprazole (priLOSEC) 40 MG capsule Take 1 capsule by mouth Daily.   11/10/2022   • oxyCODONE (ROXICODONE) 15 MG immediate release tablet Take 1 tablet by mouth 4 (Four) Times a Day.   11/10/2022   • oxyCODONE (ROXICODONE) 5 MG immediate release tablet Take 1 tablet by mouth 2 (Two) Times a Day As Needed for Moderate Pain. Prior to [facility] Admission, Patient was on:  breakthrough pain   11/10/2022   • traZODone (DESYREL) 50 MG tablet Take 1 tablet by mouth At Night As Needed for Sleep. Prior to [facility] Admission, Patient was on:  1 to 2 tabs at bedtime as needed   Past Month   • zinc oxide 13 % cream cream Apply  1 application topically to the appropriate area as directed 2 (Two) Times a Day. Prior to Religion Admission, Patient was on:  mixes with Nystatin, uses on stomach folds.   11/10/2022   , Scheduled Meds:  heparin (porcine), 5,000 Units, Subcutaneous, Q12H  sodium chloride, 10 mL, Intravenous, Q12H  vancomycin, 500 mg, Intravenous, Q24H    , Continuous Infusions:  lactated ringers, 75 mL/hr, Last Rate: Stopped (11/11/22 1050)  lactated ringers, 50 mL/hr    , PRN Meds:  •  Morphine  •  nitroglycerin  •  sodium chloride  •  sodium chloride and Allergies:  Sulfa antibiotics, Nsaids, and Penicillins    Review of Systems  More than 10 point review of systems was done. Pertinent items are noted in HPI, all other systems reviewed and negative    Objective     Vital Signs  Temp:  [98.4 °F (36.9 °C)-99.4 °F (37.4 °C)] 98.5 °F (36.9 °C)  Heart Rate:  [] 112  Resp:  [16-21] 19  BP: ()/() 117/82    Intake/Output                       11/10/22 0701 - 11/11/22 0700 11/11/22 0701 - 11/12/22 0700     [id number] [id number] Total 0466-1843 1757-6057 Total                 Intake    P.O.  --  -- --  360  -- 360    IV Piggyback  --  600 600  --  -- --    Total Intake -- 600 600 360 -- 360       Output    Urine  --  200 200  300  -- 300    Total Output -- 200 200 300 -- 300           Physical Examination:  General Appearance: not in acute distress  No JVD  Lungs: Clear to auscultation, respirations regular and unlabored  Heart: Regular rhythm & normal rate, normal S1, S2, no murmur, no gallop, no rub   Abdomen: Normal bowel sounds, no masses and soft non-tender  Extremities: 2+ edema  Neurologic: unable to assess fully    Laboratory Data :      WBC WBC   Date Value Ref Range Status   11/11/2022 9.70 3.40 - 10.80 10*3/mm3 Final   11/11/2022 10.93 (H) 3.40 - 10.80 10*3/mm3 Final   11/10/2022 13.35 (H) 3.40 - 10.80 10*3/mm3 Final      HGB Hemoglobin   Date Value Ref Range Status   11/11/2022 8.7 (L) 12.0 - 15.9 g/dL Final    11/11/2022 9.0 (L) 12.0 - 15.9 g/dL Final   11/10/2022 9.5 (L) 12.0 - 15.9 g/dL Final      HCT Hematocrit   Date Value Ref Range Status   11/11/2022 27.3 (L) 34.0 - 46.6 % Final   11/11/2022 28.1 (L) 34.0 - 46.6 % Final   11/10/2022 29.4 (L) 34.0 - 46.6 % Final      Platlets No results found for: LABPLAT   MCV MCV   Date Value Ref Range Status   11/11/2022 101.5 (H) 79.0 - 97.0 fL Final   11/11/2022 99.3 (H) 79.0 - 97.0 fL Final   11/10/2022 97.7 (H) 79.0 - 97.0 fL Final          Sodium Sodium   Date Value Ref Range Status   11/11/2022 130 (L) 136 - 145 mmol/L Final   11/11/2022 132 (L) 136 - 145 mmol/L Final   11/10/2022 133 (L) 136 - 145 mmol/L Final      Potassium Potassium   Date Value Ref Range Status   11/11/2022 5.6 (H) 3.5 - 5.2 mmol/L Final     Comment:     Specimen hemolyzed.  Results may be affected.2+ Hemolysis       11/11/2022 4.4 3.5 - 5.2 mmol/L Final     Comment:     Slight hemolysis detected by analyzer. Results may be affected.   11/10/2022 5.2 3.5 - 5.2 mmol/L Final      Chloride Chloride   Date Value Ref Range Status   11/11/2022 96 (L) 98 - 107 mmol/L Final   11/11/2022 95 (L) 98 - 107 mmol/L Final   11/10/2022 94 (L) 98 - 107 mmol/L Final      CO2 CO2   Date Value Ref Range Status   11/11/2022 18.2 (L) 22.0 - 29.0 mmol/L Final   11/11/2022 21.0 (L) 22.0 - 29.0 mmol/L Final   11/10/2022 25.9 22.0 - 29.0 mmol/L Final      BUN BUN   Date Value Ref Range Status   11/11/2022 69 (H) 6 - 20 mg/dL Final   11/11/2022 68 (H) 6 - 20 mg/dL Final   11/10/2022 67 (H) 6 - 20 mg/dL Final      Creatinine Creatinine   Date Value Ref Range Status   11/11/2022 4.30 (H) 0.57 - 1.00 mg/dL Final   11/11/2022 4.53 (H) 0.57 - 1.00 mg/dL Final   11/10/2022 4.41 (H) 0.57 - 1.00 mg/dL Final      Calcium Calcium   Date Value Ref Range Status   11/11/2022 8.5 (L) 8.6 - 10.5 mg/dL Final   11/11/2022 8.8 8.6 - 10.5 mg/dL Final   11/10/2022 9.1 8.6 - 10.5 mg/dL Final      PO4 No results found for: CAPO4   Albumin Albumin    Date Value Ref Range Status   11/11/2022 1.58 (L) 3.50 - 5.20 g/dL Final   11/11/2022 2.11 (L) 3.50 - 5.20 g/dL Final   11/10/2022 2.68 (L) 3.50 - 5.20 g/dL Final      Magnesium No results found for: MG   Uric Acid No results found for: URICACID     Radiology results :     Imaging Results (Last 72 Hours)     Procedure Component Value Units Date/Time    US Venous Doppler Lower Extremity Right (duplex) [[id number]] Collected: 11/11/22 0958     Updated: 11/11/22 1016    Narrative:      US VENOUS DOPPLER LOWER EXTREMITY RIGHT (DUPLEX)-     CLINICAL INDICATION: Painful RLE, obese, bed bound; N19-Unspecified  kidney failure        COMPARISON: None available      TECHNIQUE: Color Doppler imaging was used with compression and  augmentation to evaluate the right lower extremity deep venous system.     FINDINGS:   There is patent spontaneous flow from the common femoral vein through  the posterior tibial veins.  There was no internal clot or area of noncompressibility in the right  lower extremity.  Normal augmentation was elicited where applicable.          Impression:      No DVT in the right lower extremity on today's exam.      This report was finalized on 11/11/2022 9:58 AM by Dr. Drew Cm MD.       CT Lower Extremity Right Without Contrast [[id number]] Collected: 11/10/22 2326     Updated: 11/10/22 2328    Narrative:      CT LE RT WO    INDICATION:    Redness and swelling of the right lower extremity. Renal failure. Possible septic arthritis.    TECHNIQUE:   CT of the right lower extremity without IV contrast. Coronal and sagittal reconstructions were obtained.  Radiation dose reduction techniques included automated exposure control or exposure modulation based on body size. Count of known CT and cardiac nuc  med studies performed in previous 12 months: 0.      COMPARISON:    None available.    FINDINGS:  There is motion degradation. Study sensitivity limited by noncontrast technique. Included pelvis shows moderate  to large stool ball in the rectum. Decompressed bladder. No drainable fluid collection.    Advanced degenerative change of the right knee. Small nonspecific knee joint effusion. Degenerative change in the right ankle and right foot. Nonspecific enlarged inguinal lymph nodes are present. These may be reactive but should be correlated  clinically. Beginning at the level of the right thigh there is moderate induration of the subcutaneous fat laterally. This becomes more severe distally and there is overlying skin thickening. By the level of the mid to distal thigh, there is advanced  injection of the subcutaneous fat and more confluent severe inflammatory change of the subcutaneous fat medially with associated overlying skin thickening. Maximum thickness is up to greater than 15 cm medially at the level of the knee. There is no  well-defined drainable fluid collection identified. Below the level of the knee there is advanced subcutaneous fat stranding extending to the level of the ankle with associated overlying skin thickening. There is extension into the right foot. No  subcutaneous gas identified. No focal erosive changes. Imaging features are nonspecific but suggestive of advanced right lower extremity cellulitis. Although there is some injection of the fat within the muscular planes of the thigh anterior greater  extent the distal lower extremity, and the majority of the inflammatory change involves the subcutaneous fat and extends to the level of the surrounding fascia.      Impression:        1. Imaging findings most likely reflecting advanced cellulitis of the right lower extremity extending from the level of the proximal thigh through the foot with maximum thickness of the inflammatory change and stranding medially measuring greater than 15  cm at the level of the distal thigh and knee. No distinct drainable fluid collection. No subcutaneous gas. No focal erosive changes to suggest osteomyelitis at this time.  If there is persistent clinical concern for osteomyelitis, MRI could be performed  for further evaluation.    Signer Name: Faustino Lima MD   Signed: 11/10/2022 11:26 PM   Workstation Name: [id number]    [facility]    CT Chest Without Contrast Diagnostic [[id number]] Collected: 11/10/22 1720     Updated: 11/10/22 1723    Narrative:      CT Chest WO    CLINICAL HISTORY: soa.    COMPARISON: None.    TECHNIQUE: CT chest without contrast. Coronal and sagittal reconstructions were obtained.  Radiation dose reduction techniques included automated exposure control or exposure modulation based on body size. Count of known CT and cardiac nuc med studies  performed in previous 12 months: 0.     FINDINGS:    Lungs: Bibasilar subsegmental atelectasis. No focal consolidation or mass. Small calcified pulmonary nodule in left upper lobe, likely pulmonary granuloma.    Pleura: No pleural effusions.    Cardiovascular: Normal heart size. No pericardial effusion. Normal course and caliber of the thoracic aorta.    Mediastinum: Evaluation is limited by lack of intravenous contrast. Small calcified hilar mediastinal lymph nodes.    Osseous: No acute osseous abnormality. Degenerative changes of the thoracic spine and dextroconvex curvature of the thoracic spine.    Other: None.    Upper abdomen: Small hiatal hernia.       Impression:        1.  No acute cardiopulmonary disease.   2.  Bibasilar subsegmental atelectasis.  3.  Small hiatal hernia.    Signer Name: Mark Bird MD   Signed: 11/10/2022 5:20 PM   Workstation Name: [id number]    [facility]    CT Abdomen Pelvis Without Contrast [[id number]] Collected: 11/10/22 1717     Updated: 11/10/22 1719    Narrative:      CT Abdomen Pelvis WO    INDICATION:   abd pain    TECHNIQUE:   CT of the abdomen and pelvis without IV contrast. Coronal and sagittal reconstructions were obtained.  Radiation dose reduction techniques included automated exposure  control or exposure modulation based on body size. Count of known CT and cardiac nuc  med studies performed in previous 12 months: 0.     COMPARISON:   None available.    FINDINGS:  Evaluation of abdominal viscera is limited due to lack of intravenous contrast. Study is also degraded by patient body habitus.    Lower chest: Unremarkable.  Liver: Mildly nodular in contour. No solid mass.  Gallbladder and bile ducts: Surgically absent gallbladder. No biliary dilatation.  Spleen: Borderline enlarged with scattered punctate calcifications, likely sequelae of old granulomatous disease.  Pancreas: Diffusely atrophic.  Adrenals: Unremarkable.  Kidneys and ureters: No renal or ureteral stones. No hydronephrosis.  Bowel: Postsurgical changes of the stomach. Nondilated bowel with no wall thickening.Appendix not visualized, but no inflammatory changes present in the right lower quadrant.  Bladder: Underdistended.  Reproductive organs: Normal uterus. No adnexal masses.  Lymph nodes: Unremarkable.  Peritoneum: Unremarkable.  Vessels: Prominent portosystemic collateral vessels.  Abdominal wall: Unremarkable.  Bones: Multilevel degenerative changes of the lumbar spine. Levoconvex curvature of the lumbar spine..      Impression:        1.  Exam is limited by lack of intravenous contrast and patient body habitus.  2.  No acute intra-abdominal abnormality.  3.  Mildly nodular contour of the liver which may reflect early cirrhosis. Recommend correlation with liver function tests.  4.  Borderline splenomegaly.    Signer Name: Mark Bird MD   Signed: 11/10/2022 5:17 PM   Workstation Name: [id number]    [facility]    XR Chest 1 View [[id number]] Collected: 11/10/22 1615     Updated: 11/10/22 1617    Narrative:      EXAM:    XR Chest, 1 View     EXAM DATE:    11/10/2022 3:13 PM     CLINICAL HISTORY:    cough     TECHNIQUE:    Frontal view of the chest.     COMPARISON:    No relevant prior studies available.      FINDINGS:    Lungs:  Pulmonary vascular congestion.  Right basilar airspace  disease.    Pleural space:  Unremarkable.  No pneumothorax.    Heart:  Marked cardiomegaly.    Mediastinum:  Unremarkable.    Bones/joints:  Unremarkable.       Impression:      1.  Cardiomegaly and pulmonary vascular congestion.  2.  Right basilar airspace disease.     This report was finalized on 11/10/2022 4:15 PM by Dr. Carlos Manuel King MD.               Medications:      heparin (porcine), 5,000 Units, Subcutaneous, Q12H  sodium chloride, 10 mL, Intravenous, Q12H  vancomycin, 500 mg, Intravenous, Q24H      lactated ringers, 75 mL/hr, Last Rate: Stopped (11/11/22 1050)  lactated ringers, 50 mL/hr        Assessment & Plan       Acute on chronic renal failure (HCC)      1. SUZETTE, non ogliguric  2. Hyponatremia likely hypovolumic presumed chronic  3. Anion gap metabolic acidosis  4. Hyperkalemia  5. Anemia and thrombocytopenia  6. History of hep C induced liver Cirrhosis    SUZETTE likley due to multifactorial, including ATN from prolonged pre renal state with concomitant use of  NSAIDS, and ATN from sepsis.   Other etiology to consider is HUS, and given the history of anemia, thrombocytopenia and hyperkalemia, needs to r/o TMA. Although anemia and thrombocytopenia can be due to cirrhosis with splenomegaly.  Baseline Cr unknown, admitted with 4.5  No hematuria, no proteinuria on UA  No hydronephrosis on CT  -LDH, Haptoglobin  -peripheral blood smear, talked to lab to run as stat and if positive might need to transfer to tertiary center  -LR 75cc/h for 10 hours and then 50cc/h  -recommend to avoid vanc and switch to dapto    Thanks Dr Licea for the consult. Nephrology will follow the patient.   I discussed the patient's findings and my recommendations with patient and consulting provider      Colin Chris MD  11/11/22  12:57 EST

## 2022-11-11 NOTE — PROGRESS NOTES
"    [facility] HOSPITALIST PROGRESS NOTE     Patient Identification:  Name:  Malina Velasquez  Age:  57 y.o.  Sex:  female  :  1965  MRN:  [id number]  Visit Number:  [id number]  ROOM: [address]     Primary Care Provider:  Jeremiah Topete MD    Length of stay in inpatient status:  1    Subjective     Chief Compliant:    Chief Complaint   Patient presents with   • Weakness - Generalized     Pt states dr sent her here for high wbc and kidney function        History of Presenting Illness:    Patient seen and examined with nursing staff present and attempting to obtain IV access. Patient says she is \"scared I'm going to die\" and says she is worried about her , the autistic grandson they raise, and the other grandchildren that they care for after school daily. She reports feeling shortness of breath and having productive cough. No aggravating/alleviating factors. Denies abdominal pain or vomiting. Says that her right lower extremity swelling is worse than usual, especially in the lower part of her leg.     Objective     Current Hospital Meds:heparin (porcine), 5,000 Units, Subcutaneous, Q12H  sodium chloride, 10 mL, Intravenous, Q12H  vancomycin, 500 mg, Intravenous, Q24H    lactated ringers, 75 mL/hr  lactated ringers, 50 mL/hr        Current Antimicrobial Therapy:  Anti-Infectives (From admission, onward)    Ordered     Dose/Rate Route Frequency Start Stop    11/10/22 1941  vancomycin (VANCOCIN) 500 mg in sodium chloride 0.9 % 100 mL IVPB        Ordering Provider: Miguel Licea MD    500 mg  over 60 Minutes Intravenous Every 24 Hours 22 1700 22 1659    11/10/22 193  meropenem (MERREM) 1 g in sodium chloride 0.9 % 100 mL IVPB-VTB        Ordering Provider: Miguel Licea MD    1 g  over 30 Minutes Intravenous Once 11/10/22 2000 11/10/22 2038    11/10/22 1539  aztreonam (AZACTAM) 2 g in sodium chloride 0.9 % 100 mL IVPB-VTB        Ordering Provider: Joyce Lindo, " PA    2 g  200 mL/hr over 30 Minutes Intravenous Once 11/10/22 1541 11/10/22 2009    11/10/22 1539  vancomycin 2500 mg/500 mL 0.9% NS IVPB ([facility])        Ordering Provider: Joyce Lindo PA    2,500 mg  over 120 Minutes Intravenous Once 11/10/22 1541 11/10/22 1950        Current Diuretic Therapy:  Diuretics (From admission, onward)    None        ----------------------------------------------------------------------------------------------------------------------  Vital Signs:  Temp:  [98.4 °F (36.9 °C)-99.4 °F (37.4 °C)] 98.6 °F (37 °C)  Heart Rate:  [] 112  Resp:  [16-21] 19  BP: ()/() 117/82  SpO2:  [90 %-98 %] 97 %  on   ;   Device (Oxygen Therapy): room air  Body mass index is 88.83 kg/m².    Wt Readings from Last 3 Encounters:   11/11/22 (!) 281 kg (619 lb 1.6 oz)     Intake & Output (last 3 days)       11/08 0701  11/09 0700 11/09 0701  11/10 0700 11/10 0701  11/11 0700 11/11 0701  11/12 0700    P.O.    360    IV Piggyback   600     Total Intake(mL/kg)   600 (2.1) 360 (1.3)    Urine (mL/kg/hr)   200     Total Output   200     Net   +400 +360                Diet Regular; Cardiac, Renal  ----------------------------------------------------------------------------------------------------------------------  Physical exam:   Constitutional:  Well-developed and well-nourished.  No acute distress.      HENT:  Head:  Normocephalic and atraumatic.    Cardiovascular:  Normal rate, regular rhythm and normal heart sounds with no murmur.  Pulmonary/Chest:  No respiratory distress, no wheezes. Crackles heard in bilateral bases, L>R  Abdominal:  Soft. No distension and no tenderness.   Musculoskeletal:  No deformity.  Right lower extremity is tender to palpation from the knee down. Significant lymphedema limits exam of knee joint.  Neurological:  Awake, alert, no focal deficits on gross examination. No slurred speech or facial droop.  Skin:  Skin is warm and dry. Right lower extremity has mild erythema  from knee down to foot  Peripheral vascular:  No cyanosis. Lymphedema of bilateral lower extremities, R>L. 2-3+ pitting edema of right lower extremity extending to the thigh  Psychiatric: Appropriate mood and affect    ----------------------------------------------------------------------------------------------------------------------  Results from last 7 days   Lab Units 11/11/22 0648 11/11/22 0045 11/10/22  2209 11/10/22  1800 11/10/22  1546 11/10/22  1433   CRP mg/dL  --  17.21*  --   --   --  20.13*   LACTATE mmol/L 4.6* 5.0* 5.1*   < >  --  5.0*   WBC 10*3/mm3 9.70 10.93*  --   --   --  13.35*   HEMOGLOBIN g/dL 8.7* 9.0*  --   --   --  9.5*   HEMATOCRIT % 27.3* 28.1*  --   --   --  29.4*   MCV fL 101.5* 99.3*  --   --   --  97.7*   MCHC g/dL 31.9 32.0  --   --   --  32.3   PLATELETS 10*3/mm3 43* 47*  --   --   --  56*   INR   --   --   --   --  1.12*  --     < > = values in this interval not displayed.         Results from last 7 days   Lab Units 11/11/22 0648 11/11/22  0045 11/10/22  1433   SODIUM mmol/L 130* 132* 133*   POTASSIUM mmol/L 5.6* 4.4 5.2   CHLORIDE mmol/L 96* 95* 94*   CO2 mmol/L 18.2* 21.0* 25.9   BUN mg/dL 69* 68* 67*   CREATININE mg/dL 4.30* 4.53* 4.41*   CALCIUM mg/dL 8.5* 8.8 9.1   GLUCOSE mg/dL 112* 125* 114*   ALBUMIN g/dL 1.58* 2.11* 2.68*   BILIRUBIN mg/dL 0.7 0.7 0.8   ALK PHOS U/L 101 116 134*   AST (SGOT) U/L 34* 25 22   ALT (SGPT) U/L 10 11 9   Estimated Creatinine Clearance: 35.1 mL/min (A) (by C-G formula based on SCr of 4.3 mg/dL (H)).  No results found for: AMMONIA  Results from last 7 days   Lab Units 11/10/22  1433   TROPONIN T ng/mL 0.016             Hemoglobin A1C   Date/Time Value Ref Range Status   11/10/2022 1433 4.70 (L) 4.80 - 5.60 % Final     Lab Results   Component Value Date    TSH 0.960 11/10/2022     No results found for: PREGTESTUR, PREGSERUM, HCG, HCGQUANT  Pain Management Panel    There is no flowsheet data to display.       Brief Urine Lab Results  (Last  result in the past 365 days)      Color   Clarity   Blood   Leuk Est   Nitrite   Protein   CREAT   Urine HCG        11/10/22 1825 Yellow   Clear   Negative   Negative   Negative   Negative               No results found for: BLOODCX  No results found for: URINECX  No results found for: WOUNDCX  No results found for: STOOLCX  No results found for: RESPCX  No results found for: AFBCX  Results from last 7 days   Lab Units 11/11/22  0648 11/11/22  0045 11/10/22  2209 11/10/22  1800 11/10/22  1433   PROCALCITONIN ng/mL  --  0.87*  --   --  1.13*   LACTATE mmol/L 4.6* 5.0* 5.1* 4.2* 5.0*   CRP mg/dL  --  17.21*  --   --  20.13*       I have personally looked at the labs and they are summarized above.  ----------------------------------------------------------------------------------------------------------------------  Detailed radiology reports for the last 24 hours:  Imaging Results (Last 24 Hours)     Procedure Component Value Units Date/Time    US Venous Doppler Lower Extremity Right (duplex) [[id number]] Collected: 11/11/22 0958     Updated: 11/11/22 1016    Narrative:      US VENOUS DOPPLER LOWER EXTREMITY RIGHT (DUPLEX)-     CLINICAL INDICATION: Painful RLE, obese, bed bound; N19-Unspecified  kidney failure        COMPARISON: None available      TECHNIQUE: Color Doppler imaging was used with compression and  augmentation to evaluate the right lower extremity deep venous system.     FINDINGS:   There is patent spontaneous flow from the common femoral vein through  the posterior tibial veins.  There was no internal clot or area of noncompressibility in the right  lower extremity.  Normal augmentation was elicited where applicable.          Impression:      No DVT in the right lower extremity on today's exam.      This report was finalized on 11/11/2022 9:58 AM by Dr. Drew Cm MD.       CT Lower Extremity Right Without Contrast [[id number]] Collected: 11/10/22 2326     Updated: 11/10/22 2328    Narrative:      CT  LE RT WO    INDICATION:    Redness and swelling of the right lower extremity. Renal failure. Possible septic arthritis.    TECHNIQUE:   CT of the right lower extremity without IV contrast. Coronal and sagittal reconstructions were obtained.  Radiation dose reduction techniques included automated exposure control or exposure modulation based on body size. Count of known CT and cardiac nuc  med studies performed in previous 12 months: 0.      COMPARISON:    None available.    FINDINGS:  There is motion degradation. Study sensitivity limited by noncontrast technique. Included pelvis shows moderate to large stool ball in the rectum. Decompressed bladder. No drainable fluid collection.    Advanced degenerative change of the right knee. Small nonspecific knee joint effusion. Degenerative change in the right ankle and right foot. Nonspecific enlarged inguinal lymph nodes are present. These may be reactive but should be correlated  clinically. Beginning at the level of the right thigh there is moderate induration of the subcutaneous fat laterally. This becomes more severe distally and there is overlying skin thickening. By the level of the mid to distal thigh, there is advanced  injection of the subcutaneous fat and more confluent severe inflammatory change of the subcutaneous fat medially with associated overlying skin thickening. Maximum thickness is up to greater than 15 cm medially at the level of the knee. There is no  well-defined drainable fluid collection identified. Below the level of the knee there is advanced subcutaneous fat stranding extending to the level of the ankle with associated overlying skin thickening. There is extension into the right foot. No  subcutaneous gas identified. No focal erosive changes. Imaging features are nonspecific but suggestive of advanced right lower extremity cellulitis. Although there is some injection of the fat within the muscular planes of the thigh anterior greater  extent the  distal lower extremity, and the majority of the inflammatory change involves the subcutaneous fat and extends to the level of the surrounding fascia.      Impression:        1. Imaging findings most likely reflecting advanced cellulitis of the right lower extremity extending from the level of the proximal thigh through the foot with maximum thickness of the inflammatory change and stranding medially measuring greater than 15  cm at the level of the distal thigh and knee. No distinct drainable fluid collection. No subcutaneous gas. No focal erosive changes to suggest osteomyelitis at this time. If there is persistent clinical concern for osteomyelitis, MRI could be performed  for further evaluation.    Signer Name: Faustino Lima MD   Signed: 11/10/2022 11:26 PM   Workstation Name: [id number]    [facility]    CT Chest Without Contrast Diagnostic [[id number]] Collected: 11/10/22 1720     Updated: 11/10/22 1723    Narrative:      CT Chest WO    CLINICAL HISTORY: soa.    COMPARISON: None.    TECHNIQUE: CT chest without contrast. Coronal and sagittal reconstructions were obtained.  Radiation dose reduction techniques included automated exposure control or exposure modulation based on body size. Count of known CT and cardiac nuc med studies  performed in previous 12 months: 0.     FINDINGS:    Lungs: Bibasilar subsegmental atelectasis. No focal consolidation or mass. Small calcified pulmonary nodule in left upper lobe, likely pulmonary granuloma.    Pleura: No pleural effusions.    Cardiovascular: Normal heart size. No pericardial effusion. Normal course and caliber of the thoracic aorta.    Mediastinum: Evaluation is limited by lack of intravenous contrast. Small calcified hilar mediastinal lymph nodes.    Osseous: No acute osseous abnormality. Degenerative changes of the thoracic spine and dextroconvex curvature of the thoracic spine.    Other: None.    Upper abdomen: Small hiatal hernia.        Impression:        1.  No acute cardiopulmonary disease.   2.  Bibasilar subsegmental atelectasis.  3.  Small hiatal hernia.    Signer Name: Mark Bird MD   Signed: 11/10/2022 5:20 PM   Workstation Name: [id number]    [facility]    CT Abdomen Pelvis Without Contrast [[id number]] Collected: 11/10/22 1717     Updated: 11/10/22 1719    Narrative:      CT Abdomen Pelvis WO    INDICATION:   abd pain    TECHNIQUE:   CT of the abdomen and pelvis without IV contrast. Coronal and sagittal reconstructions were obtained.  Radiation dose reduction techniques included automated exposure control or exposure modulation based on body size. Count of known CT and cardiac nuc  med studies performed in previous 12 months: 0.     COMPARISON:   None available.    FINDINGS:  Evaluation of abdominal viscera is limited due to lack of intravenous contrast. Study is also degraded by patient body habitus.    Lower chest: Unremarkable.  Liver: Mildly nodular in contour. No solid mass.  Gallbladder and bile ducts: Surgically absent gallbladder. No biliary dilatation.  Spleen: Borderline enlarged with scattered punctate calcifications, likely sequelae of old granulomatous disease.  Pancreas: Diffusely atrophic.  Adrenals: Unremarkable.  Kidneys and ureters: No renal or ureteral stones. No hydronephrosis.  Bowel: Postsurgical changes of the stomach. Nondilated bowel with no wall thickening.Appendix not visualized, but no inflammatory changes present in the right lower quadrant.  Bladder: Underdistended.  Reproductive organs: Normal uterus. No adnexal masses.  Lymph nodes: Unremarkable.  Peritoneum: Unremarkable.  Vessels: Prominent portosystemic collateral vessels.  Abdominal wall: Unremarkable.  Bones: Multilevel degenerative changes of the lumbar spine. Levoconvex curvature of the lumbar spine..      Impression:        1.  Exam is limited by lack of intravenous contrast and patient body habitus.  2.  No acute  intra-abdominal abnormality.  3.  Mildly nodular contour of the liver which may reflect early cirrhosis. Recommend correlation with liver function tests.  4.  Borderline splenomegaly.    Signer Name: Mark Bird MD   Signed: 11/10/2022 5:17 PM   Workstation Name: [id number]    [facility]    XR Chest 1 View [[id number]] Collected: 11/10/22 1615     Updated: 11/10/22 1617    Narrative:      EXAM:    XR Chest, 1 View     EXAM DATE:    11/10/2022 3:13 PM     CLINICAL HISTORY:    cough     TECHNIQUE:    Frontal view of the chest.     COMPARISON:    No relevant prior studies available.     FINDINGS:    Lungs:  Pulmonary vascular congestion.  Right basilar airspace  disease.    Pleural space:  Unremarkable.  No pneumothorax.    Heart:  Marked cardiomegaly.    Mediastinum:  Unremarkable.    Bones/joints:  Unremarkable.       Impression:      1.  Cardiomegaly and pulmonary vascular congestion.  2.  Right basilar airspace disease.     This report was finalized on 11/10/2022 4:15 PM by Dr. Carlos Manuel King MD.           Assessment & Plan      #Severe sepsis criteria met likely secondary to cellulitis of right lower extremity, present on admission   #Cellulitis of right foot and possible recurrent versus persistent septic arthritis of right knee joint , present on admission   #History of MRSA infection of right knee joint s/p washout  #History of pyogenic arthritis of right knee joint 07/2022  #Reported recent nausea, vomiting, and diarrhea  -Met severe sepsis criteria with HR >90, C-RP 20.13, WBC 13.35, lactate 5.0, and procalcitonin 1.13.   -Urinalysis with no evidence of acute UTI. No evidence of pneumonia on chest x-ray. No acute intra-abdominal findings on CT abdomen/pelvis.  - Continue vancomycin monotherapy per ID recommendations  - Appreciate ID and Orthopedic Surgery recommendations  - GI PCR negative  - Follow up blood cultures     #Acute on chronic kidney disease, present on admission   #Anemia  and thrombocytopenia  - Creatinine 4.41 at admission, per ED report creatinine was previously 1.34  - Nephrology consulted, appreciate assistance  -Per Nephrology HUS is a possible concern with her anemia/thrombocytopenia--peripheral smear ordered and results pending. LDH, haptoglobin ordered. May also be secondary to cirrhosis vs SUZETTE. Repeat CBC in AM.    #Reported history of CHF  - Echo ordered, results pending    #Essential hypertension  - Monitor BP per protocol  - Continue home metoprolol    #Liver cirrhosis  #Chronic hepatitis C  #Hypoalbuminemia  - Liver enzymes normal at admission. May benefit from referral to GI at discharge.    #Hypothyroidism  - TSH normal at admission. Continue home levothyroxine.  Edited by: Ann Marie Ochoa DO at 11/11/2022 1891      VTE Prophylaxis:   Mechanical Order History:     None      Pharmalogical Order History:      Ordered     Dose Route Frequency Stop    11/10/22 2214  heparin (porcine) 5000 UNIT/ML injection 5,000 Units         5,000 Units SC Every 12 Hours Scheduled --                Dispo: pending clinical improvement    Ann Marie Ochoa DO  [facility]  11/11/22  10:31 EST

## 2022-11-11 NOTE — CONSULTS
Patient is evaluated and examined  History show patient with severe osteoarthritic knee was treated with a right knee septic condition around 2019 in [place] with a hospitalization of what seems to be blood sepsis and septic right knee.  The patient had a open arthrotomy and lavage at that time with identification of MRSA infection.  She was treated with the course of IV antibiotic for 6 weeks then on an intermittent basis p.o. antibiotics.  She stated that she was evaluated by an orthopedic surgeon at [facility] for that the painful right knee all course of antibiotic were stopped and she end up with an aspiration done in the radiology department with inability to do any aspiration in the office setting.  This was done in this facility in the radiology department and aspiration were negative.  She stated that she is still taking antibiotics on a intermittent basis.  This was presenting a flareup of knee pain left all lower extremity painful sensation she states she always had injured skin fold on that right lower extremity thigh area.  This patient has been on the nonweightbearing status and mainly wheelchair-bound for the last 3 years.  Admitted here with some course of diarrhea and infection was admitted with spiking fever of 103 and possibility of blood sepsis.  Examination of the patient show BMI over 60 very and large lower extremity on both sides large skin fold hanging down all around the knee joint and lower extremity.  Mild cellulitis regarding the foot area and large injured skin fold regarding the thigh area close to the groin.  Knee show a longitudinal incision regarding the [place] lateral border of the knee joint no swelling no major redness regarding the knee joint the palpation do not seems to trigger much more pain worse painful sensation is regarding the thigh skin fold area and the foot area.  CT scan mainly show severe osteoarthritic knee changes with some possibility of some osteolytic  lesions.  Glimpse of the left knee show as well as some arthritic changes.      So impression is sepsis of unknown origin may be from cellulitis of lower extremity  Patient has been treated for a previous septic knee arthritis but had reevaluation in July that failed to demonstrate any septic joint  Patient seems to be not an active IV drug user stating that she had hepatitis C from the tattoo.  Does not seems that she never had evaluation for heart vegetation with a transesophageal ultrasound this is something that must be probably evaluated.  If aspiration of the knee is needed that this should be performed in the radiology department under ultrasound or CT scan guided because of the bodily habitus of the patient.  I do not recommend any surgical treatment for now.  And observation with IV antibiotic and response of the course of treatment.

## 2022-11-11 NOTE — PLAN OF CARE
Goal Outcome Evaluation:      Pt welcomed and greeted to PCU this shift. Pt taken to CT this shift for scan of the R lower extremity. PRN pain medications initiated. Safety maintained, call light in reach.

## 2022-11-11 NOTE — CONSULTS
[facility]   Consult Note    Patient Name: Malina Velasquez  : 1965  MRN: [id number]  Primary Care Physician:  Jeremiah Topete MD  Referring Physician: No Known Provider  Date of admission: 11/10/2022    Consults  Subjective   Subjective     Reason for Consult/ Chief Complaint: IV access, sepsis    History of Present Illness  Malina Velasquez is a super morbidly obese 57 y.o. female who was admitted with weakness and cellulitis in the leg. She has had a septic knee drained in the past but apparently does not have that now. She has CKD and a high likelihood of eventual dialysis so she cannot have a PIC line or midline catheter. She has had a central line in the past.     Review of Systems   Constitutional: Positive for fatigue. Negative for activity change, appetite change, chills, fever and unexpected weight change.   HENT: Negative for congestion, facial swelling and sore throat.    Eyes: Negative for photophobia and visual disturbance.   Respiratory: Positive for cough. Negative for chest tightness, shortness of breath and wheezing.    Cardiovascular: Positive for leg swelling. Negative for chest pain and palpitations.   Gastrointestinal: Positive for diarrhea, nausea and vomiting. Negative for abdominal distention, abdominal pain, anal bleeding, blood in stool, constipation and rectal pain.   Endocrine: Negative for cold intolerance, heat intolerance, polydipsia and polyuria.   Genitourinary: Negative for difficulty urinating, dysuria, flank pain and urgency.   Musculoskeletal: Positive for arthralgias. Negative for back pain and myalgias.   Skin: Positive for color change. Negative for rash and wound.   Allergic/Immunologic: Negative for immunocompromised state.   Neurological: Positive for weakness. Negative for dizziness, seizures, syncope, light-headedness, numbness and headaches.   Hematological: Negative for adenopathy. Does not bruise/bleed easily.   Psychiatric/Behavioral: Negative for  behavioral problems and confusion. The patient is not nervous/anxious.         Personal History     Past Medical History:   Diagnosis Date   • CHF (congestive heart failure) (HCC)    • COPD (chronic obstructive pulmonary disease) (HCC)    • Edema    • Hep C w/o coma, chronic (HCC)    • Hypertension    • Mitral valve prolapse        Past Surgical History:   Procedure Laterality Date   • GASTRIC BANDING     • JOINT ASPIRATION AND/OR INJECTION Right 07/2022       Family History: family history includes Breast cancer in her mother; COPD in her father; Hypertension in her mother. Otherwise pertinent FHx was reviewed and not pertinent to current issue.    Social History:  reports that she has never smoked. She does not have any smokeless tobacco history on file. She reports that she does not currently use alcohol. She reports that she does not use drugs.    Home Medications:   bumetanide, cetirizine, diphenhydrAMINE, docusate sodium, fluticasone, hydrOXYzine, levothyroxine, metoprolol tartrate, nystatin, omeprazole, oxyCODONE, traZODone, and zinc oxide    Allergies:  Allergies   Allergen Reactions   • Sulfa Antibiotics Rash   • Nsaids Swelling   • Penicillins Rash       Objective    Objective     Vitals:  Temp:  [98.4 °F (36.9 °C)-99.4 °F (37.4 °C)] 98.5 °F (36.9 °C)  Heart Rate:  [] 106  Resp:  [18-21] 18  BP: ()/() 114/65    Physical Exam  Constitutional:       General: She is not in acute distress.     Appearance: She is obese. She is not toxic-appearing.   HENT:      Head: Normocephalic.      Right Ear: External ear normal.      Left Ear: External ear normal.      Nose: Nose normal.      Mouth/Throat:      Pharynx: No oropharyngeal exudate or posterior oropharyngeal erythema.   Eyes:      General: No scleral icterus.     Pupils: Pupils are equal, round, and reactive to light.   Cardiovascular:      Rate and Rhythm: Normal rate and regular rhythm.      Heart sounds: Normal heart sounds. No murmur  heard.    No friction rub. No gallop.   Pulmonary:      Effort: No respiratory distress.      Breath sounds: No rhonchi or rales.   Abdominal:      General: There is no distension.      Palpations: There is no mass.      Tenderness: There is no abdominal tenderness. There is no guarding.      Hernia: No hernia is present.   Musculoskeletal:         General: Swelling present.      Cervical back: No rigidity.   Skin:     General: Skin is warm.      Findings: Erythema present.   Neurological:      Mental Status: She is alert.         Result Review    Result Review:  I have personally reviewed the results from the time of this admission to 11/11/2022 15:07 EST and agree with these findings:  []  Laboratory list / accordion  []  Microbiology  []  Radiology  []  EKG/Telemetry   []  Cardiology/Vascular   []  Pathology  []  Old records  []  Other:  Most notable findings include:       Assessment & Plan   Assessment / Plan     Brief Patient Summary:  Malina Velasquez is a 57 y.o. female who has poor IV access. She is having a US guided IV placed currently that seems to be in.      Active Hospital Problems:  Active Hospital Problems    Diagnosis    • **Acute on chronic renal failure (HCC)      Plan: place central line if she her US guided placement does not work.       Jeremiah Gil MD

## 2022-11-11 NOTE — CASE MANAGEMENT/SOCIAL WORK
Discharge Planning Assessment   Andrzej     Patient Name: Malina Velasquez  MRN: [id number]  Today's Date: 11/11/2022    Admit Date: 11/10/2022    Plan: Pt was admitted on 11/10/22. SS received nursing consult for Possible Septic knee Arthritis. Likely will need disposition planning with hx knee washout. Pt lives at [address] [place] [id number]. Pt lives with spouse (Ant) and plans to return home at discharge. Pt does not utilize  services at this time. Pt did utilize [facility] in the past. Pt PCP Josesito Jacobson. Pt utilizes hospital bed, lift via [employer], walker via private purhcase for DME needs. Pt utilizes [facility]. Pt does not have no POA/Living will. Pt will need an ambulance to provide transportation at discharge. Pt voiced needing a new hospital bed and new wheelchair. If IV antibiotics are needed at discharge pt is agreeable to administer with education from HH services. If ordered, pt perfers [facility]. SS to follow.      Discharge Needs Assessment     Row Name 11/11/22 1447       Living Environment    People in Home spouse    Name(s) of People in Home Spouse (Ant)    Current Living Arrangements home    Primary Care Provided by spouse/significant other    Provides Primary Care For child(renate);spouse    Family Caregiver if Needed grandchild(renate), adult;spouse    Quality of Family Relationships helpful;involved;supportive    Able to Return to Prior Arrangements yes       Transition Planning    Patient/Family Anticipates Transition to home with family       Discharge Needs Assessment    Equipment Currently Used at Home hospital bed;lift device;walker, standard    Concerns to be Addressed --  Possible Septic knee Arthritis. Likely will need disposition planning with hx knee washout.               Discharge Plan     Row Name 11/11/22 1453       Plan    Plan Pt was admitted on 11/10/22. SS received nursing consult for Possible Septic knee Arthritis. Likely will need disposition planning with hx  knee washout. Pt lives at [address] [place] [id number]. Pt lives with spouse (Ant) and plans to return home at discharge. Pt does not utilize HH services at this time. Pt did utilize [facility] in the past. Pt PCP Josesito Jacobson. Pt utilizes hospital bed, lift via [employer], walker via private purhcase for DME needs. Pt utilizes [facility]. Pt does not have no POA/Living will. Pt will need an ambulance to provide transportation at discharge. Pt voiced needing a new hospital bed and new wheelchair. If IV antibiotics are needed at discharge pt is agreeable to administer with education from HH services. If ordered, pt perfers [facility]. SS to follow.                CAROLINE MarmolejoW

## 2022-11-11 NOTE — CONSULTS
INFECTIOUS DISEASE CONSULTATION REPORT        Patient Identification:  Name:  Malina Velasquez  Age:  57 y.o.  Sex:  female  :  1965  MRN:  [id number]   Visit Number:  [id number]  Primary Care Physician:  Jeremiah Topete MD        Referring Provider:  Dr. Ochoa    Reason for consult: Septic shock of unknown etiology       LOS: 1 day        Subjective       Subjective     History of present illness:      Thank you Dr. Ochoa for allowing us to participate in the care of your patient.  As you well know, Ms. Malina Velasquez is a 57 y.o. female with past medical history significant for CHF, hypertension, MVP, COPD, chronic hepatitis C, former tobacco use, right knee pyogenic arthritis due to unspecified organism, who presented to [facility] Emergency Department on 11/10/2022 for abnormal labs.  WBC 10.93.  CRP 17.21.  Lactic acid 5.0 on admission.  Procalcitonin 0.87.  Urinalysis unremarkable.  COVID-19 influenza PCR negative.  Blood cultures from 11/10/2022 currently in process.  CT of the abdomen pelvis from 11/10/2022 reports no acute intra-abdominal abnormality, mildly nodular contour of the liver may represent early cirrhosis, borderline splenomegaly.  CT of the chest from 11/10/2022 reports no acute cardiopulmonary disease, bibasilar subsegmental atelectasis, small hiatal hernia.  Right lower extremity CT he reports images reflecting advanced cellulitis of the right lower extremity extending from the level of the proximal thigh to the foot with maximum thickness of the inflammatory change and straining medial measuring greater than 15 cm at the level of the distal thigh and knee, no distinct drainable fluid collection, no subcutaneous gas, no focal erosive changes to suggest osteomyelitis.  Right lower extremity venous duplex reports no evidence of DVT.       Infectious Disease consultation was requested for antimicrobial  management.      ---------------------------------------------------------------------------------------------------------------------     Review Of Systems:    Constitutional: no fever, chills and night sweats. No appetite change or unexpected weight change. No fatigue.  Eyes: no eye drainage, itching or redness.  HEENT: no mouth sores, dysphagia or nose bleed. Head congestion.  Respiratory: no for shortness of breath, cough or production of sputum.  Cardiovascular: no chest pain, no palpitations, no orthopnea.  Gastrointestinal: no nausea, vomiting or diarrhea. No abdominal pain, hematemesis or rectal bleeding.  Genitourinary: no dysuria or polyuria.  Hematologic/lymphatic: no lymph node abnormalities, no easy bruising or easy bleeding.  Musculoskeletal: Right lower extremity pain.  Skin: No rash and no itching.  Neurological: no loss of consciousness, no seizure, no headache.  Behavioral/Psych: no depression or suicidal ideation.  Endocrine: no hot flashes.  Immunologic: negative.    ---------------------------------------------------------------------------------------------------------------------     Past Medical History    Past Medical History:   Diagnosis Date   • CHF (congestive heart failure) (HCC)    • COPD (chronic obstructive pulmonary disease) (HCC)    • Edema    • Hep C w/o coma, chronic (HCC)    • Hypertension    • Mitral valve prolapse        Past Surgical History    Past Surgical History:   Procedure Laterality Date   • GASTRIC BANDING     • JOINT ASPIRATION AND/OR INJECTION Right 07/2022       Family History    Family History   Problem Relation Age of Onset   • Hypertension Mother    • Breast cancer Mother    • COPD Father        Social History    Social History     Tobacco Use   • Smoking status: Never   Vaping Use   • Vaping Use: Never used   Substance Use Topics   • Alcohol use: Not Currently   • Drug use: Never       Allergies    Sulfa antibiotics and  Penicillins  ---------------------------------------------------------------------------------------------------------------------     Home Medications:    Prior to Admission Medications     Prescriptions Last Dose Informant Patient Reported? Taking?    bumetanide (BUMEX) 2 MG tablet   Yes No    Take 1 tablet by mouth Daily.    hydrALAZINE (APRESOLINE) 10 MG tablet   Yes No    Take 1 tablet by mouth 3 (Three) Times a Day.    levothyroxine (SYNTHROID, LEVOTHROID) 25 MCG tablet   Yes No    Take 1 tablet by mouth Daily.    metoprolol succinate XL (TOPROL-XL) 25 MG 24 hr tablet   Yes No    Take 1 tablet by mouth Daily.    oxyCODONE-acetaminophen (PERCOCET)  MG per tablet   Yes No    Take 1 tablet by mouth Every 6 (Six) Hours As Needed for Moderate Pain.    traZODone (DESYREL) 50 MG tablet   Yes No    Take 1 tablet by mouth Every Night.        ---------------------------------------------------------------------------------------------------------------------    Objective       Objective     Hospital Scheduled Meds:  heparin (porcine), 5,000 Units, Subcutaneous, Q12H  sodium chloride, 10 mL, Intravenous, Q12H  vancomycin, 500 mg, Intravenous, Q24H      lactated ringers, 75 mL/hr  lactated ringers, 50 mL/hr      ---------------------------------------------------------------------------------------------------------------------   Vital Signs:  Temp:  [98.4 °F (36.9 °C)-99.4 °F (37.4 °C)] 98.6 °F (37 °C)  Heart Rate:  [] 112  Resp:  [16-21] 19  BP: ()/() 117/82  Mean Arterial Pressure (Non-Invasive) for the past 24 hrs (Last 3 readings):   Noninvasive MAP (mmHg)   11/11/22 0900 99   11/11/22 0800 84   11/11/22 0700 68     SpO2 Percentage    11/11/22 0700 11/11/22 0800 11/11/22 0900   SpO2: 94% 96% 97%     SpO2:  [90 %-98 %] 97 %  on   ;   Device (Oxygen Therapy): room air    Body mass index is 88.83 kg/m².  Wt Readings from Last 3 Encounters:   11/11/22 (!) 281 kg (619 lb 1.6 oz)      ---------------------------------------------------------------------------------------------------------------------     Physical Exam:    Constitutional:  Well-developed and well-nourished.  No respiratory distress.   Sitting up in bed.  On room air with no apparent distress.    HENT:  Head: Normocephalic and atraumatic.  Mouth:  Moist mucous membranes.    Eyes:  Conjunctivae and EOM are normal.  No scleral icterus.  Neck:  Neck supple.  No JVD present.    Cardiovascular:  Normal rate, regular rhythm and normal heart sounds with no murmur. No edema.  Pulmonary/Chest:  No respiratory distress, no wheezes, no crackles, with normal breath sounds and good air movement.  Abdominal:  Soft.  Bowel sounds are normal.  No distension and no tenderness.   Musculoskeletal: Bilateral lower extremity lymphedema.  Right lower extremity edema with mild erythema and warmth, pain with palpation.  Neurological:  Alert and oriented to person, place, and time.  No facial droop.  No slurred speech.   Skin:  Skin is warm and dry.  No rash noted.  No pallor. Bilateral lower extremity lymphedema.  Right lower extremity edema with mild erythema and warmth, pain with palpation.  Psychiatric:  Normal mood and affect.  Behavior is normal.    ---------------------------------------------------------------------------------------------------------------------    Results from last 7 days   Lab Units 11/10/22  1433   TROPONIN T ng/mL 0.016           Results from last 7 days   Lab Units 11/11/22  0648 11/11/22  0045 11/10/22  2209 11/10/22  1800 11/10/22  1546 11/10/22  1433   CRP mg/dL  --  17.21*  --   --   --  20.13*   LACTATE mmol/L 4.6* 5.0* 5.1*   < >  --  5.0*   WBC 10*3/mm3 9.70 10.93*  --   --   --  13.35*   HEMOGLOBIN g/dL 8.7* 9.0*  --   --   --  9.5*   HEMATOCRIT % 27.3* 28.1*  --   --   --  29.4*   MCV fL 101.5* 99.3*  --   --   --  97.7*   MCHC g/dL 31.9 32.0  --   --   --  32.3   PLATELETS 10*3/mm3 43* 47*  --   --   --  56*   INR    --   --   --   --  1.12*  --     < > = values in this interval not displayed.     Results from last 7 days   Lab Units 11/11/22  0648 11/11/22  0045 11/10/22  1433   SODIUM mmol/L 130* 132* 133*   POTASSIUM mmol/L 5.6* 4.4 5.2   CHLORIDE mmol/L 96* 95* 94*   CO2 mmol/L 18.2* 21.0* 25.9   BUN mg/dL 69* 68* 67*   CREATININE mg/dL 4.30* 4.53* 4.41*   CALCIUM mg/dL 8.5* 8.8 9.1   GLUCOSE mg/dL 112* 125* 114*   ALBUMIN g/dL 1.58* 2.11* 2.68*   BILIRUBIN mg/dL 0.7 0.7 0.8   ALK PHOS U/L 101 116 134*   AST (SGOT) U/L 34* 25 22   ALT (SGPT) U/L 10 11 9   Estimated Creatinine Clearance: 35.1 mL/min (A) (by C-G formula based on SCr of 4.3 mg/dL (H)).  No results found for: AMMONIA    Hemoglobin A1C   Date/Time Value Ref Range Status   11/10/2022 1433 4.70 (L) 4.80 - 5.60 % Final     Lab Results   Component Value Date    HGBA1C 4.70 (L) 11/10/2022     Lab Results   Component Value Date    TSH 0.960 11/10/2022       No results found for: BLOODCX  No results found for: URINECX  No results found for: WOUNDCX  No results found for: STOOLCX  No results found for: RESPCX  Pain Management Panel    There is no flowsheet data to display.       I have personally reviewed the above laboratory results.   ---------------------------------------------------------------------------------------------------------------------  Imaging Results (Last 7 Days)     Procedure Component Value Units Date/Time    US Venous Doppler Lower Extremity Right (duplex) [[id number]] Resulted: 11/11/22 0942     Updated: 11/11/22 0942    CT Lower Extremity Right Without Contrast [[id number]] Collected: 11/10/22 2326     Updated: 11/10/22 2328    Narrative:      CT LE RT WO    INDICATION:    Redness and swelling of the right lower extremity. Renal failure. Possible septic arthritis.    TECHNIQUE:   CT of the right lower extremity without IV contrast. Coronal and sagittal reconstructions were obtained.  Radiation dose reduction techniques included automated exposure  control or exposure modulation based on body size. Count of known CT and cardiac nuc  med studies performed in previous 12 months: 0.      COMPARISON:    None available.    FINDINGS:  There is motion degradation. Study sensitivity limited by noncontrast technique. Included pelvis shows moderate to large stool ball in the rectum. Decompressed bladder. No drainable fluid collection.    Advanced degenerative change of the right knee. Small nonspecific knee joint effusion. Degenerative change in the right ankle and right foot. Nonspecific enlarged inguinal lymph nodes are present. These may be reactive but should be correlated  clinically. Beginning at the level of the right thigh there is moderate induration of the subcutaneous fat laterally. This becomes more severe distally and there is overlying skin thickening. By the level of the mid to distal thigh, there is advanced  injection of the subcutaneous fat and more confluent severe inflammatory change of the subcutaneous fat medially with associated overlying skin thickening. Maximum thickness is up to greater than 15 cm medially at the level of the knee. There is no  well-defined drainable fluid collection identified. Below the level of the knee there is advanced subcutaneous fat stranding extending to the level of the ankle with associated overlying skin thickening. There is extension into the right foot. No  subcutaneous gas identified. No focal erosive changes. Imaging features are nonspecific but suggestive of advanced right lower extremity cellulitis. Although there is some injection of the fat within the muscular planes of the thigh anterior greater  extent the distal lower extremity, and the majority of the inflammatory change involves the subcutaneous fat and extends to the level of the surrounding fascia.      Impression:        1. Imaging findings most likely reflecting advanced cellulitis of the right lower extremity extending from the level of the  proximal thigh through the foot with maximum thickness of the inflammatory change and stranding medially measuring greater than 15  cm at the level of the distal thigh and knee. No distinct drainable fluid collection. No subcutaneous gas. No focal erosive changes to suggest osteomyelitis at this time. If there is persistent clinical concern for osteomyelitis, MRI could be performed  for further evaluation.    Signer Name: Faustino Lima MD   Signed: 11/10/2022 11:26 PM   Workstation Name: [id number]    [facility]    CT Chest Without Contrast Diagnostic [[id number]] Collected: 11/10/22 1720     Updated: 11/10/22 1723    Narrative:      CT Chest WO    CLINICAL HISTORY: soa.    COMPARISON: None.    TECHNIQUE: CT chest without contrast. Coronal and sagittal reconstructions were obtained.  Radiation dose reduction techniques included automated exposure control or exposure modulation based on body size. Count of known CT and cardiac nuc med studies  performed in previous 12 months: 0.     FINDINGS:    Lungs: Bibasilar subsegmental atelectasis. No focal consolidation or mass. Small calcified pulmonary nodule in left upper lobe, likely pulmonary granuloma.    Pleura: No pleural effusions.    Cardiovascular: Normal heart size. No pericardial effusion. Normal course and caliber of the thoracic aorta.    Mediastinum: Evaluation is limited by lack of intravenous contrast. Small calcified hilar mediastinal lymph nodes.    Osseous: No acute osseous abnormality. Degenerative changes of the thoracic spine and dextroconvex curvature of the thoracic spine.    Other: None.    Upper abdomen: Small hiatal hernia.       Impression:        1.  No acute cardiopulmonary disease.   2.  Bibasilar subsegmental atelectasis.  3.  Small hiatal hernia.    Signer Name: Mark Bird MD   Signed: 11/10/2022 5:20 PM   Workstation Name: [id number]    [facility]    CT Abdomen Pelvis Without Contrast  [[id number]] Collected: 11/10/22 1717     Updated: 11/10/22 1719    Narrative:      CT Abdomen Pelvis WO    INDICATION:   abd pain    TECHNIQUE:   CT of the abdomen and pelvis without IV contrast. Coronal and sagittal reconstructions were obtained.  Radiation dose reduction techniques included automated exposure control or exposure modulation based on body size. Count of known CT and cardiac nuc  med studies performed in previous 12 months: 0.     COMPARISON:   None available.    FINDINGS:  Evaluation of abdominal viscera is limited due to lack of intravenous contrast. Study is also degraded by patient body habitus.    Lower chest: Unremarkable.  Liver: Mildly nodular in contour. No solid mass.  Gallbladder and bile ducts: Surgically absent gallbladder. No biliary dilatation.  Spleen: Borderline enlarged with scattered punctate calcifications, likely sequelae of old granulomatous disease.  Pancreas: Diffusely atrophic.  Adrenals: Unremarkable.  Kidneys and ureters: No renal or ureteral stones. No hydronephrosis.  Bowel: Postsurgical changes of the stomach. Nondilated bowel with no wall thickening.Appendix not visualized, but no inflammatory changes present in the right lower quadrant.  Bladder: Underdistended.  Reproductive organs: Normal uterus. No adnexal masses.  Lymph nodes: Unremarkable.  Peritoneum: Unremarkable.  Vessels: Prominent portosystemic collateral vessels.  Abdominal wall: Unremarkable.  Bones: Multilevel degenerative changes of the lumbar spine. Levoconvex curvature of the lumbar spine..      Impression:        1.  Exam is limited by lack of intravenous contrast and patient body habitus.  2.  No acute intra-abdominal abnormality.  3.  Mildly nodular contour of the liver which may reflect early cirrhosis. Recommend correlation with liver function tests.  4.  Borderline splenomegaly.    Signer Name: Mark Bird MD   Signed: 11/10/2022 5:17 PM   Workstation Name: [id number]    [facility]  [place]    XR Chest 1 View [[id number]] Collected: 11/10/22 1615     Updated: 11/10/22 1617    Narrative:      EXAM:    XR Chest, 1 View     EXAM DATE:    11/10/2022 3:13 PM     CLINICAL HISTORY:    cough     TECHNIQUE:    Frontal view of the chest.     COMPARISON:    No relevant prior studies available.     FINDINGS:    Lungs:  Pulmonary vascular congestion.  Right basilar airspace  disease.    Pleural space:  Unremarkable.  No pneumothorax.    Heart:  Marked cardiomegaly.    Mediastinum:  Unremarkable.    Bones/joints:  Unremarkable.       Impression:      1.  Cardiomegaly and pulmonary vascular congestion.  2.  Right basilar airspace disease.     This report was finalized on 11/10/2022 4:15 PM by Dr. Carlos Manuel King MD.           I have personally reviewed the above radiology results.   ---------------------------------------------------------------------------------------------------------------------      Pertinent Infectious Disease Results      Assessment & Plan      Assessment      Septic shock with lactic acid greater than 4 on admission  History of right knee pyogenic arthritis with MRSA  With superimposed right knee chronic osteomyelitis  Right lower extremity cellulitis and lymphedema        Plan      I saw and examined the patient myself this morning with RUKHSANA Kennedy with whom I discussed the plan of care and primary RN and here are my findings:    Patient was seen with Dr. Oneal and I discussed the case at length with him and he does not plan for any arthrocentesis or joint washout at this time.    Patient is comfortable no acute distress with complaints of right knee pain.    Laboratory wise, WBC 10.93.  CRP 17.21.  Lactic acid 5.0 on admission.  Procalcitonin 0.87.  Urinalysis unremarkable.  COVID-19 influenza PCR negative.  Blood cultures from 11/10/2022 currently in process.  CT of the abdomen pelvis from 11/10/2022 reports no acute intra-abdominal abnormality, mildly nodular contour  of the liver may represent early cirrhosis, borderline splenomegaly.  CT of the chest from 11/10/2022 reports no acute cardiopulmonary disease, bibasilar subsegmental atelectasis, small hiatal hernia.  Right lower extremity CT he reports images reflecting advanced cellulitis of the right lower extremity extending from the level of the proximal thigh to the foot with maximum thickness of the inflammatory change and straining medial measuring greater than 15 cm at the level of the distal thigh and knee, no distinct drainable fluid collection, no subcutaneous gas, no focal erosive changes to suggest osteomyelitis.  Right lower extremity venous duplex reports no evidence of DVT.    Based on her previous culture with MRSA from joint aspiration and from my discussion with Dr. Oneal, I discontinued meropenem and continued with vancomycin monotherapy for now as patient is going require very prolonged course for chronic osteomyelitis of the right knee secondary to a complication from recurrent partially treated right knee septic arthritis and patient will need a very prolonged course of IV antibiotic therapy x8 weeks followed by oral suppressive therapy with a very gram prognosis regarding clearing the infection without repetitive washout and aspiration but per Dr. Oneal her risk outweighs benefit for such procedure.    There are much cheaper oral antibiotic options that could be done as outpatient to prevent recurrence of her chronic osteomyelitis.  MRSA nasal screen would not be helpful in this situation neither repeat imaging.        Again, thank you Dr. Ochoa for allowing us to participate in the care of your patient and please feel free to call for any questions you may have.        Code Status:     Code Status and Medical Interventions:   Ordered at: 11/10/22 1939     Level Of Support Discussed With:    Patient     Code Status (Patient has no pulse and is not breathing):    CPR (Attempt to Resuscitate)      Medical Interventions (Patient has pulse or is breathing):    Full Support         RUKHSANA Kennedy  11/11/22  10:11 EST     Electronically signed by RUKHSANA Kennedy, 11/11/22, 10:22 AM EST.    Electronically signed by Kane Winter MD, 11/11/22, 12:38 PM EST.

## 2022-11-11 NOTE — PLAN OF CARE
Goal Outcome Evaluation:         Pt A&Ox4. VSS at present time, with soft BP during my shift, remains on RA. LR infusing- see MAR. Purwik in place with adequate UO. Ultrasound guided IV placed today. Pt c/o frequent R leg and foot pain during my shift- see MAR. Currently sitting up in bed eating dinner. Call light within reach.

## 2022-11-11 NOTE — CONSULTS
Consults    Patient Identification:  Name:  Malina Velasquez  Age:  57 y.o.  Sex:  female  :  1965  MRN:  [id number]  Visit Number:  [id number]  Primary care provider:  Jeremiah Topete MD    History of presenting illness:   This is a 57-year-old female who orthopedic surgery was consulted to evaluate for possible right knee septic arthritis.  Patient notes she was originally told she had right knee septic arthritis in 2018.  At that time she underwent irrigation and debridement of the right knee.  The patient notes that this is the only surgical intervention she has ever had to the right knee.  The patient notes in July the right knee pain worsened.  At that time she underwent a CT-guided aspiration.  She notes this aspiration was negative.  The patient has been followed by a infectious disease service in [facility].  The patient notes that she has had chronic right knee pain since 2018.  She has not ambulated in 3 years.  The patient notes that she has not been on any IV antibiotic therapy for approximately a year.    Patient notes 3 days ago she began to note erythema to the right lower extremity.  She has severe chronic lymphedema.  Patient was admitted to [facility].  Patient is on IV antibiotics.  Orthopedic surgery was consulted to evaluate for possible right knee septic arthritis.    Review of Systems:     Constitution: Positive weight gain  Eyes:  No loss of vision  ENT:  no epistaxis  Respiratory:  no hemoptysis  Cardiovascular: no CP, no SOB.  Gastrointestinal: No abdominal pain  Genitourinary: No hematuria  Integument: No skin rash  Hematologic / Lymphatic: No petechiae  Musculoskeletal: See HPI  Neurological: No seizures   Endocrine: No  tremors  ---------------------------------------------------------------------------------------------------------------------  ---------------------------------------------------------------------------------------------------------------------   Past History:  Family History   Problem Relation Age of Onset   • Hypertension Mother    • Breast cancer Mother    • COPD Father      Past Medical History:   Diagnosis Date   • CHF (congestive heart failure) ([facility])    • COPD (chronic obstructive pulmonary disease) ([facility])    • Edema    • Hep C w/o coma, chronic (HCC)    • Hypertension    • Mitral valve prolapse      Past Surgical History:   Procedure Laterality Date   • GASTRIC BANDING     • JOINT ASPIRATION AND/OR INJECTION Right 07/2022     Social History     Socioeconomic History   • Marital status:    Tobacco Use   • Smoking status: Never   Vaping Use   • Vaping Use: Never used   Substance and Sexual Activity   • Alcohol use: Not Currently   • Drug use: Never   • Sexual activity: Defer     ---------------------------------------------------------------------------------------------------------------------   Allergies:  Sulfa antibiotics, Nsaids, and Penicillins  ---------------------------------------------------------------------------------------------------------------------   Prior to Admission Medications     Prescriptions Last Dose Informant Patient Reported? Taking?    bumetanide (BUMEX) 2 MG tablet 11/10/2022 Pharmacy Yes Yes    Take 1 tablet by mouth 2 (Two) Times a Day.    cetirizine (zyrTEC) 10 MG tablet Past Week Pharmacy Yes Yes    Take 1 tablet by mouth 2 (Two) Times a Day As Needed for Allergies.    diphenhydrAMINE (BENADRYL) 25 mg capsule Past Week Pharmacy Yes Yes    Take 1 capsule by mouth At Night As Needed for Sleep. Prior to [facility] Admission, Patient was on:  1 to 2 caps at bedtime as needed    docusate sodium (COLACE) 100 MG capsule Past Week Pharmacy Yes Yes    Take 1 capsule by mouth 2  (Two) Times a Day As Needed for Constipation.    fluticasone (FLONASE) 50 MCG/ACT nasal spray 11/10/2022 Pharmacy Yes Yes    2 sprays into the nostril(s) as directed by provider Daily.    hydrOXYzine (ATARAX) 50 MG tablet Past Week Pharmacy Yes Yes    Take 1 tablet by mouth 3 (Three) Times a Day As Needed for Anxiety.    levothyroxine (SYNTHROID, LEVOTHROID) 100 MCG tablet 11/10/2022 Pharmacy Yes Yes    Take 1 tablet by mouth Daily.    metoprolol tartrate (LOPRESSOR) 50 MG tablet Past Month Pharmacy Yes Yes    Take 12.5 mg by mouth 2 (Two) Times a Day As Needed. Prior to [facility] Admission, Patient was on:  if heart rate greater than 100    nystatin (MYCOSTATIN) 359572 UNIT/GM cream 11/10/2022 Pharmacy Yes Yes    Apply 1 application topically to the appropriate area as directed 2 (Two) Times a Day. Prior to [facility] Admission, Patient was on:  Mixes with Zinc oxide, uses on stomach folds    nystatin (MYCOSTATIN) 031559 UNIT/GM powder 11/10/2022 Pharmacy Yes Yes    Apply 1 application topically to the appropriate area as directed 2 (Two) Times a Day As Needed. Prior to [facility] Admission, Patient was on:  stomach folds    omeprazole (priLOSEC) 40 MG capsule 11/10/2022 Pharmacy Yes Yes    Take 1 capsule by mouth Daily.    oxyCODONE (ROXICODONE) 15 MG immediate release tablet 11/10/2022 Pharmacy Yes Yes    Take 1 tablet by mouth 4 (Four) Times a Day.    oxyCODONE (ROXICODONE) 5 MG immediate release tablet 11/10/2022 Pharmacy Yes Yes    Take 1 tablet by mouth 2 (Two) Times a Day As Needed for Moderate Pain. Prior to [facility] Admission, Patient was on:  breakthrough pain    traZODone (DESYREL) 50 MG tablet Past Month Pharmacy Yes Yes    Take 1 tablet by mouth At Night As Needed for Sleep. Prior to [facility] Admission, Patient was on:  1 to 2 tabs at bedtime as needed    zinc oxide 13 % cream cream 11/10/2022 Pharmacy Yes Yes    Apply 1 application topically to the appropriate area as directed 2 (Two) Times a Day. Prior to  [facility] Admission, Patient was on:  mixes with Nystatin, uses on stomach folds.        [facility] Meds:  fluticasone, 2 spray, Nasal, Daily  heparin (porcine), 5,000 Units, Subcutaneous, Q12H  levothyroxine, 100 mcg, Oral, Daily  metoprolol tartrate, 12.5 mg, Oral, Q12H  nystatin, 1 application, Topical, BID  oxyCODONE, 15 mg, Oral, 4x Daily  [START ON 11/12/2022] pantoprazole, 40 mg, Oral, QAM  sodium chloride, 10 mL, Intravenous, Q12H  vancomycin, 1,000 mg, Intravenous, Q24H  zinc oxide, , Topical, BID      lactated ringers, 75 mL/hr, Last Rate: 75 mL/hr (11/11/22 1311)  lactated ringers, 50 mL/hr      ---------------------------------------------------------------------------------------------------------------------   Vital Signs:  Temp:  [98.4 °F (36.9 °C)-99.4 °F (37.4 °C)] 98.8 °F (37.1 °C)  Heart Rate:  [] 106  Resp:  [18-21] 18  BP: ()/() 114/65      11/10/22  1340 11/11/22  0400   Weight: (!) 181 kg (399 lb) (!) 281 kg (619 lb 1.6 oz)     Body mass index is 88.83 kg/m².  ---------------------------------------------------------------------------------------------------------------------   Physical exam:  Constitutional: Morbidly obese female. no respiratory distress.      HEENT: Normocephalic and atraumatic.  Neck:  Neck supple.  Cardiovascular:  Normal rate  Pulmonary/Chest:  No respiratory distress  Abdominal:  Soft, no tenderness.   Musculoskeletal: Patient's body habitus complicates physical exam.  No cellulitis or erythema of the right knee is noted.  Patient tender to palpation diffusely inferior to the right knee.  Patient has erythema to the dorsum of the right foot.  There is no erythema to the proximal third of the tibia.  Prior longitudinal incision of the right knee is noted.  No obvious swelling or joint effusion of the right knee noted.  Patient is able to dorsiflex and plantarflex the right ankle.  Neurological:  Alert and oriented to person, place, and time.   Skin: Multiple  areas of weeping is noted.  Psychiatric:  Normal mood and affect    ---------------------------------------------------------------------------------------------------------------------   .  ---------------------------------------------------------------------------------------------------------------------   Results from last 7 days   Lab Units 11/11/22  1226 11/11/22  0648 11/11/22  0045 11/10/22  1800 11/10/22  1546 11/10/22  1433   CRP mg/dL  --   --  17.21*  --   --  20.13*   LACTATE mmol/L 3.9* 4.6* 5.0*   < >  --  5.0*   WBC 10*3/mm3  --  9.70 10.93*  --   --  13.35*   HEMOGLOBIN g/dL  --  8.7* 9.0*  --   --  9.5*   HEMATOCRIT %  --  27.3* 28.1*  --   --  29.4*   MCV fL  --  101.5* 99.3*  --   --  97.7*   MCHC g/dL  --  31.9 32.0  --   --  32.3   PLATELETS 10*3/mm3  --  43* 47*  --   --  56*   INR   --   --   --   --  1.12*  --     < > = values in this interval not displayed.         Results from last 7 days   Lab Units 11/11/22  0648 11/11/22 0045 11/10/22  1433   SODIUM mmol/L 130* 132* 133*   POTASSIUM mmol/L 5.6* 4.4 5.2   CHLORIDE mmol/L 96* 95* 94*   CO2 mmol/L 18.2* 21.0* 25.9   BUN mg/dL 69* 68* 67*   CREATININE mg/dL 4.30* 4.53* 4.41*   CALCIUM mg/dL 8.5* 8.8 9.1   GLUCOSE mg/dL 112* 125* 114*   ALBUMIN g/dL 1.58* 2.11* 2.68*   BILIRUBIN mg/dL 0.7 0.7 0.8   ALK PHOS U/L 101 116 134*   AST (SGOT) U/L 34* 25 22   ALT (SGPT) U/L 10 11 9   Estimated Creatinine Clearance: 35.1 mL/min (A) (by C-G formula based on SCr of 4.3 mg/dL (H)).  No results found for: AMMONIA  Results from last 7 days   Lab Units 11/10/22  1433   TROPONIN T ng/mL 0.016         Lab Results   Component Value Date    HGBA1C 4.70 (L) 11/10/2022     Lab Results   Component Value Date    TSH 0.960 11/10/2022     No results found for: PREGTESTUR, PREGSERUM, HCG, HCGQUANT  Pain Management Panel     Pain Management Panel Latest Ref Rng & Units 11/11/2022    AMPHETAMINES SCREEN, URINE Negative Negative    BARBITURATES SCREEN Negative  Negative    BENZODIAZEPINE SCREEN, URINE Negative Negative    BUPRENORPHINEUR Negative Negative    COCAINE SCREEN, URINE Negative Negative    METHADONE SCREEN, URINE Negative Negative    METHAMPHETAMINEUR Negative Negative        Blood Culture   Date Value Ref Range Status   11/10/2022 No growth at 24 hours  Preliminary   11/10/2022 No growth at 24 hours  Preliminary     No results found for: URINECX  No results found for: WOUNDCX  No results found for: STOOLCX      ---------------------------------------------------------------------------------------------------------------------   Imaging Results (Last 7 Days)     Procedure Component Value Units Date/Time    US Venous Doppler Lower Extremity Right (duplex) [[id number]] Collected: 11/11/22 0958     Updated: 11/11/22 1016    Narrative:      US VENOUS DOPPLER LOWER EXTREMITY RIGHT (DUPLEX)-     CLINICAL INDICATION: Painful RLE, obese, bed bound; N19-Unspecified  kidney failure        COMPARISON: None available      TECHNIQUE: Color Doppler imaging was used with compression and  augmentation to evaluate the right lower extremity deep venous system.     FINDINGS:   There is patent spontaneous flow from the common femoral vein through  the posterior tibial veins.  There was no internal clot or area of noncompressibility in the right  lower extremity.  Normal augmentation was elicited where applicable.          Impression:      No DVT in the right lower extremity on today's exam.      This report was finalized on 11/11/2022 9:58 AM by Dr. Drew Cm MD.       CT Lower Extremity Right Without Contrast [[id number]] Collected: 11/10/22 2326     Updated: 11/10/22 2328    Narrative:      CT LE RT WO    INDICATION:    Redness and swelling of the right lower extremity. Renal failure. Possible septic arthritis.    TECHNIQUE:   CT of the right lower extremity without IV contrast. Coronal and sagittal reconstructions were obtained.  Radiation dose reduction techniques included  automated exposure control or exposure modulation based on body size. Count of known CT and cardiac nuc  med studies performed in previous 12 months: 0.      COMPARISON:    None available.    FINDINGS:  There is motion degradation. Study sensitivity limited by noncontrast technique. Included pelvis shows moderate to large stool ball in the rectum. Decompressed bladder. No drainable fluid collection.    Advanced degenerative change of the right knee. Small nonspecific knee joint effusion. Degenerative change in the right ankle and right foot. Nonspecific enlarged inguinal lymph nodes are present. These may be reactive but should be correlated  clinically. Beginning at the level of the right thigh there is moderate induration of the subcutaneous fat laterally. This becomes more severe distally and there is overlying skin thickening. By the level of the mid to distal thigh, there is advanced  injection of the subcutaneous fat and more confluent severe inflammatory change of the subcutaneous fat medially with associated overlying skin thickening. Maximum thickness is up to greater than 15 cm medially at the level of the knee. There is no  well-defined drainable fluid collection identified. Below the level of the knee there is advanced subcutaneous fat stranding extending to the level of the ankle with associated overlying skin thickening. There is extension into the right foot. No  subcutaneous gas identified. No focal erosive changes. Imaging features are nonspecific but suggestive of advanced right lower extremity cellulitis. Although there is some injection of the fat within the muscular planes of the thigh anterior greater  extent the distal lower extremity, and the majority of the inflammatory change involves the subcutaneous fat and extends to the level of the surrounding fascia.      Impression:        1. Imaging findings most likely reflecting advanced cellulitis of the right lower extremity extending from the  level of the proximal thigh through the foot with maximum thickness of the inflammatory change and stranding medially measuring greater than 15  cm at the level of the distal thigh and knee. No distinct drainable fluid collection. No subcutaneous gas. No focal erosive changes to suggest osteomyelitis at this time. If there is persistent clinical concern for osteomyelitis, MRI could be performed  for further evaluation.    Signer Name: Faustino Liam MD   Signed: 11/10/2022 11:26 PM   Workstation Name: [id number]    [facility]    CT Chest Without Contrast Diagnostic [[id number]] Collected: 11/10/22 1720     Updated: 11/10/22 1723    Narrative:      CT Chest WO    CLINICAL HISTORY: soa.    COMPARISON: None.    TECHNIQUE: CT chest without contrast. Coronal and sagittal reconstructions were obtained.  Radiation dose reduction techniques included automated exposure control or exposure modulation based on body size. Count of known CT and cardiac nuc med studies  performed in previous 12 months: 0.     FINDINGS:    Lungs: Bibasilar subsegmental atelectasis. No focal consolidation or mass. Small calcified pulmonary nodule in left upper lobe, likely pulmonary granuloma.    Pleura: No pleural effusions.    Cardiovascular: Normal heart size. No pericardial effusion. Normal course and caliber of the thoracic aorta.    Mediastinum: Evaluation is limited by lack of intravenous contrast. Small calcified hilar mediastinal lymph nodes.    Osseous: No acute osseous abnormality. Degenerative changes of the thoracic spine and dextroconvex curvature of the thoracic spine.    Other: None.    Upper abdomen: Small hiatal hernia.       Impression:        1.  No acute cardiopulmonary disease.   2.  Bibasilar subsegmental atelectasis.  3.  Small hiatal hernia.    Signer Name: Mark Bird MD   Signed: 11/10/2022 5:20 PM   Workstation Name: [id number]    [facility]    CT Abdomen Pelvis Without  Contrast [[id number]] Collected: 11/10/22 1717     Updated: 11/10/22 1719    Narrative:      CT Abdomen Pelvis WO    INDICATION:   abd pain    TECHNIQUE:   CT of the abdomen and pelvis without IV contrast. Coronal and sagittal reconstructions were obtained.  Radiation dose reduction techniques included automated exposure control or exposure modulation based on body size. Count of known CT and cardiac nuc  med studies performed in previous 12 months: 0.     COMPARISON:   None available.    FINDINGS:  Evaluation of abdominal viscera is limited due to lack of intravenous contrast. Study is also degraded by patient body habitus.    Lower chest: Unremarkable.  Liver: Mildly nodular in contour. No solid mass.  Gallbladder and bile ducts: Surgically absent gallbladder. No biliary dilatation.  Spleen: Borderline enlarged with scattered punctate calcifications, likely sequelae of old granulomatous disease.  Pancreas: Diffusely atrophic.  Adrenals: Unremarkable.  Kidneys and ureters: No renal or ureteral stones. No hydronephrosis.  Bowel: Postsurgical changes of the stomach. Nondilated bowel with no wall thickening.Appendix not visualized, but no inflammatory changes present in the right lower quadrant.  Bladder: Underdistended.  Reproductive organs: Normal uterus. No adnexal masses.  Lymph nodes: Unremarkable.  Peritoneum: Unremarkable.  Vessels: Prominent portosystemic collateral vessels.  Abdominal wall: Unremarkable.  Bones: Multilevel degenerative changes of the lumbar spine. Levoconvex curvature of the lumbar spine..      Impression:        1.  Exam is limited by lack of intravenous contrast and patient body habitus.  2.  No acute intra-abdominal abnormality.  3.  Mildly nodular contour of the liver which may reflect early cirrhosis. Recommend correlation with liver function tests.  4.  Borderline splenomegaly.    Signer Name: Mark Bird MD   Signed: 11/10/2022 5:17 PM   Workstation Name: [email or web address]    Radiology  [facility]    XR Chest 1 View [[id number]] Collected: 11/10/22 1615     Updated: 11/10/22 1617    Narrative:      EXAM:    XR Chest, 1 View     EXAM DATE:    11/10/2022 3:13 PM     CLINICAL HISTORY:    cough     TECHNIQUE:    Frontal view of the chest.     COMPARISON:    No relevant prior studies available.     FINDINGS:    Lungs:  Pulmonary vascular congestion.  Right basilar airspace  disease.    Pleural space:  Unremarkable.  No pneumothorax.    Heart:  Marked cardiomegaly.    Mediastinum:  Unremarkable.    Bones/joints:  Unremarkable.       Impression:      1.  Cardiomegaly and pulmonary vascular congestion.  2.  Right basilar airspace disease.     This report was finalized on 11/10/2022 4:15 PM by Dr. Carlos Manuel King MD.           ----------------------------------------------------------------------------------------------------------------------  Assessment:   #1 right lower extremity cellulitis          Plan:   Dr. Oneal seen and examined the patient, reviewed CT imaging.  CT scan of the right knee shows severe osteoarthritic knee with possibility of some osteolytic lesions.  Patient had work-up in July that failed to demonstrate any septic joint.  Dr. Oneal noted that if aspiration of the knee is needed this would need to be performed by radiology under ultrasound or CT guidance.  He does not recommend any surgical intervention for the right knee at this time.  Recommends continued observation, trending of CRP, and IV antibiotics per infectious disease.  Orthopedic surgery will follow.    Thank you for the consult.    Jeremiah Hooker, APRN  11/11/22  17:39 EST

## 2022-11-11 NOTE — H&P
"    [facility]  History & Physical    Patient Identification:  Name:  Malina Velasquez  Age:  57 y.o.  Sex:  female  :  1965  MRN:  [id number]   Visit Number:  [id number]  Primary Care Physician:  Jeremiah Topete MD    Subjective     11/10/2022   Chief complaint:   Chief Complaint   Patient presents with   • Weakness - Generalized     Pt states dr sent her here for high wbc and kidney function      History of presenting illness:      Malina Velasquez is a 57 y.o. female with past medical history significant for CHF, essential hypertension, history of MVP, COPD, chronic hepatitis C, and former tobacco abuse who presents to [facility] emergency department with complaints of generalized weakness.     Patient states that she seen her PCP on Monday for annual blood work and was called back this morning due to her labs being abnormal. Specifically she was told that her creatinine was 4 and her WBC was 23K. She states that she lives with her grandchildren who was recently diagnosed with the Flu. She reports that approximately 10 days ago she developed symptoms similar to what they were experiencing. She states that she developed diarrhea and used \"3 boxes of Imodium over 1 weekend.\" She states that the diarrhea is now resolved and she had a normal bowel movement on Monday. She reports that she also had some nausea and vomiting which she attributes to not having good oral intake and continuing to take her home medications on an empty stomach. However, Tuesday night she developed a cough that is productive with green-tinged sputum. She also complains of congestion, nasal drainage, and sore throat. She did take 4 doses of left-over doxycycline and a dose of Suvextro to \"clear up her infection.\" She does report chills, but denies any fevers. She denies any abdominal pain. She denies any chest pain. She does report sediment in her urine, but denies any urinary " symptoms.    Patient also explains that she has had worsening swelling of her right lower extremity over the past month. She can hardly tolerate the right knee or her right lower extremity to be touched or moved during exam. She states 3 years ago she had spontaneous MRSA infection of her right knee in which she had an arthroscopic knee joint washout performed in [place] and was treated with an extended course of antibiotics. Per chart review, she was seen by [facility] 07/2022 who performed joint aspiration and was diagnosed with pyogenic arthritis of right knee due to unspecified organism. Patient states that she does not remember completing a course of antibiotics after the aspiration of the joint was performed.     Upon arrival to the ED, vital signs were temperature 98.7, heart rate 95, respirations 16, /76, SpO2 95% on room air. CMP with glucose 114, sodium 133, chloride 94, creatinine 4.41, BUN 67, eGFR 11.1, alkaline phosphatase 134, albumin 2.68, otherwise within normal limits. CBC with WBC 13.35, RBC 3.01, hemoglobin 9.5, hematocrit 29.4, MCV 97.7, platelets 56, otherwise unremarkable. C-RP 20.13. Lactate 5.0. Procalcitonin 1.13. Hemoglobin A1c 4.7. TSH 0.960. Pending peripheral blood cultures x 2. Chest XR with cardiomegaly and pulmonary vascular congestion and right basilar airspace disease. CT abdomen/pelvis with no acute intra-abdominal abnormality, mildly nodular contour of the liver which may reflect early cirrhosis, and borderline splenomegaly. CT chest with no acute cardiopulmonary disease, small calcified pulmonary nodule of left upper lobe likely pulmonary granuloma, bibasilar subsegmental atelectasis, small hiatal hernia.     Known Emergency Department medications received prior to my evaluation included IV Vancomycin, IV Merrem, IV Dilaudid 1 mg, IV Zofran 4 mg, sepsis bolus (2055 mL). Emergency Department Room location at the time of my evaluation was [facility].      Patient will be admitted to the PCU for further evaluation and monitoring.     ---------------------------------------------------------------------------------------------------------------------   Review of Systems   Constitutional: Positive for appetite change (decreased) and chills. Negative for activity change and fever.   HENT: Positive for congestion, rhinorrhea and sore throat.    Eyes: Negative for discharge and redness.   Respiratory: Positive for cough (productive with green tinged sputum). Negative for apnea, shortness of breath and wheezing.    Cardiovascular: Positive for leg swelling (right lower extremity). Negative for chest pain and palpitations.   Gastrointestinal: Positive for diarrhea (reports having normal bowel movement on Monday), nausea and vomiting. Negative for abdominal distention and abdominal pain.   Genitourinary: Negative for difficulty urinating, dysuria, frequency and urgency.        Reports sediment in urine   Musculoskeletal: Positive for arthralgias (right knee) and joint swelling (right knee).   Skin: Negative for color change and wound.   Neurological: Positive for weakness (generalized). Negative for dizziness and light-headedness.   Psychiatric/Behavioral: Negative for agitation, behavioral problems and confusion.        ---------------------------------------------------------------------------------------------------------------------   Past Medical History:   Diagnosis Date   • CHF (congestive heart failure) ([facility])    • COPD (chronic obstructive pulmonary disease) ([facility])    • Edema    • Hep C w/o coma, chronic ([facility])    • Hypertension    • Mitral valve prolapse      Past Surgical History:   Procedure Laterality Date   • GASTRIC BANDING     • JOINT ASPIRATION AND/OR INJECTION Right 07/2022     Family History   Problem Relation Age of Onset   • Hypertension Mother    • Breast cancer Mother    • COPD Father      Social History     Socioeconomic History   • Marital status:     Tobacco Use   • Smoking status: Never   Vaping Use   • Vaping Use: Never used   Substance and Sexual Activity   • Alcohol use: Not Currently   • Drug use: Never   • Sexual activity: Defer     ---------------------------------------------------------------------------------------------------------------------   Allergies:  Sulfa antibiotics and Penicillins  ---------------------------------------------------------------------------------------------------------------------   Home medications:    Medications below are reported home medications pulling from within the system; at this time, these medications have not been reconciled unless otherwise specified and are in the verification process for further verifcation as current home medications.  Medications Prior to Admission   Medication Sig Dispense Refill Last Dose   • bumetanide (BUMEX) 2 MG tablet Take 1 tablet by mouth Daily.      • hydrALAZINE (APRESOLINE) 10 MG tablet Take 1 tablet by mouth 3 (Three) Times a Day.      • levothyroxine (SYNTHROID, LEVOTHROID) 25 MCG tablet Take 1 tablet by mouth Daily.      • metoprolol succinate XL (TOPROL-XL) 25 MG 24 hr tablet Take 1 tablet by mouth Daily.      • oxyCODONE-acetaminophen (PERCOCET)  MG per tablet Take 1 tablet by mouth Every 6 (Six) Hours As Needed for Moderate Pain.      • traZODone (DESYREL) 50 MG tablet Take 1 tablet by mouth Every Night.          Hospital Scheduled Meds:  heparin (porcine), 5,000 Units, Subcutaneous, Q12H  [START ON 11/11/2022] meropenem, 1 g, Intravenous, Q12H  sodium chloride, 10 mL, Intravenous, Q12H  [START ON 11/11/2022] vancomycin, 500 mg, Intravenous, Q24H      sodium chloride, 125 mL/hr, Last Rate: 125 mL/hr (11/10/22 2008)        Current listed hospital scheduled medications may not yet reflect those currently placed in orders that are signed and held awaiting patient's arrival to floor.    ---------------------------------------------------------------------------------------------------------------------     Objective     Vital Signs:  Temp:  [98.7 °F (37.1 °C)] 98.7 °F (37.1 °C)  Heart Rate:  [95] 95  Resp:  [16] 16  BP: (120-131)/(76-89) 120/89      11/10/22  1340   Weight: (!) 181 kg (399 lb)     Body mass index is 57.25 kg/m².  ---------------------------------------------------------------------------------------------------------------------       Physical Exam  Constitutional:       General: She is awake.      Appearance: She is obese. She is ill-appearing (disheveled).      Comments: Patient resting in bed. Appears in no acute distress.    HENT:      Head: Normocephalic and atraumatic.      Right Ear: External ear normal.      Left Ear: External ear normal.      Nose: Nose normal.      Mouth/Throat:      Mouth: Mucous membranes are moist.      Pharynx: Oropharynx is clear.   Eyes:      Extraocular Movements: Extraocular movements intact.      Conjunctiva/sclera: Conjunctivae normal.      Pupils: Pupils are equal, round, and reactive to light.   Cardiovascular:      Rate and Rhythm: Normal rate and regular rhythm.      Pulses: Normal pulses.           Dorsalis pedis pulses are 2+ on the right side and 2+ on the left side.        Posterior tibial pulses are 2+ on the right side and 2+ on the left side.      Heart sounds: Murmur (Left upper sternal border) heard.   Pulmonary:      Effort: Pulmonary effort is normal. No accessory muscle usage or respiratory distress.      Breath sounds: Normal breath sounds. No decreased breath sounds, wheezing, rhonchi or rales.      Comments: Difficult exam due to body habitus. No obvious wheezes, crackles, or rhonchi on auscultation.  Abdominal:      General: Abdomen is flat. Bowel sounds are normal. There is no distension.      Palpations: Abdomen is soft.      Tenderness: There is no abdominal tenderness. There is no guarding.   Musculoskeletal:          General: Swelling (right lower extremity) and tenderness (right lower extremity) present.      Cervical back: Normal range of motion and neck supple.      Right lower leg: Edema present.      Left lower leg: No edema.      Comments: ROM of right lower limited due to pain.    Skin:     General: Skin is warm and dry.      Findings: Erythema present.      Comments: Right foot with slight erythema on dorsal surface. Increased edema and warmth noted of RLE and right knee compared to LLE. No obvious open wounds or drainage. Difficult to assess posterior aspect of leg due to patient complaining of pain.     Not able to assess back or gluteal area due to patient refusing to turn. Will attempt to assess when patient arrives to floor and able to get assistance to turn patient.    Neurological:      General: No focal deficit present.      Mental Status: She is alert and oriented to person, place, and time. Mental status is at baseline.   Psychiatric:         Mood and Affect: Mood normal.         Behavior: Behavior normal. Behavior is cooperative.         Thought Content: Thought content normal.         ---------------------------------------------------------------------------------------------------------------------  EKG:    Pending cardiology read. Per my review, EKG reveals NSR with HR 89 and QTc 476 ms without acute ischemic changes.         I have personally looked at both the EKG and the telemetry strips.  ---------------------------------------------------------------------------------------------------------------------   Results from last 7 days   Lab Units 11/10/22  1800 11/10/22  1546 11/10/22  1433   CRP mg/dL  --   --  20.13*   LACTATE mmol/L 4.2*  --  5.0*   WBC 10*3/mm3  --   --  13.35*   HEMOGLOBIN g/dL  --   --  9.5*   HEMATOCRIT %  --   --  29.4*   MCV fL  --   --  97.7*   MCHC g/dL  --   --  32.3   PLATELETS 10*3/mm3  --   --  56*   INR   --  1.12*  --          Results from last 7 days   Lab Units  11/10/22  1433   SODIUM mmol/L 133*   POTASSIUM mmol/L 5.2   CHLORIDE mmol/L 94*   CO2 mmol/L 25.9   BUN mg/dL 67*   CREATININE mg/dL 4.41*   CALCIUM mg/dL 9.1   GLUCOSE mg/dL 114*   ALBUMIN g/dL 2.68*   BILIRUBIN mg/dL 0.8   ALK PHOS U/L 134*   AST (SGOT) U/L 22   ALT (SGPT) U/L 9   Estimated Creatinine Clearance: 25.3 mL/min (A) (by C-G formula based on SCr of 4.41 mg/dL (H)).  No results found for: AMMONIA  Results from last 7 days   Lab Units 11/10/22  1433   TROPONIN T ng/mL 0.016         Lab Results   Component Value Date    HGBA1C 4.70 (L) 11/10/2022     Lab Results   Component Value Date    TSH 0.960 11/10/2022     No results found for: PREGTESTUR, PREGSERUM, HCG, HCGQUANT  Pain Management Panel    There is no flowsheet data to display.           ---------------------------------------------------------------------------------------------------------------------  Imaging Results (Last 7 Days)     Procedure Component Value Units Date/Time    CT Lower Extremity Right Without Contrast [[id number]] Resulted: 11/10/22 2229     Updated: 11/10/22 2246    CT Chest Without Contrast Diagnostic [[id number]] Collected: 11/10/22 1720     Updated: 11/10/22 1723    Narrative:      CT Chest WO    CLINICAL HISTORY: soa.    COMPARISON: None.    TECHNIQUE: CT chest without contrast. Coronal and sagittal reconstructions were obtained.  Radiation dose reduction techniques included automated exposure control or exposure modulation based on body size. Count of known CT and cardiac nuc med studies  performed in previous 12 months: 0.     FINDINGS:    Lungs: Bibasilar subsegmental atelectasis. No focal consolidation or mass. Small calcified pulmonary nodule in left upper lobe, likely pulmonary granuloma.    Pleura: No pleural effusions.    Cardiovascular: Normal heart size. No pericardial effusion. Normal course and caliber of the thoracic aorta.    Mediastinum: Evaluation is limited by lack of intravenous contrast. Small calcified  hilar mediastinal lymph nodes.    Osseous: No acute osseous abnormality. Degenerative changes of the thoracic spine and dextroconvex curvature of the thoracic spine.    Other: None.    Upper abdomen: Small hiatal hernia.       Impression:        1.  No acute cardiopulmonary disease.   2.  Bibasilar subsegmental atelectasis.  3.  Small hiatal hernia.    Signer Name: Mark Bird MD   Signed: 11/10/2022 5:20 PM   Workstation Name: [id number]    [facility]    CT Abdomen Pelvis Without Contrast [[id number]] Collected: 11/10/22 1717     Updated: 11/10/22 1719    Narrative:      CT Abdomen Pelvis WO    INDICATION:   abd pain    TECHNIQUE:   CT of the abdomen and pelvis without IV contrast. Coronal and sagittal reconstructions were obtained.  Radiation dose reduction techniques included automated exposure control or exposure modulation based on body size. Count of known CT and cardiac nuc  med studies performed in previous 12 months: 0.     COMPARISON:   None available.    FINDINGS:  Evaluation of abdominal viscera is limited due to lack of intravenous contrast. Study is also degraded by patient body habitus.    Lower chest: Unremarkable.  Liver: Mildly nodular in contour. No solid mass.  Gallbladder and bile ducts: Surgically absent gallbladder. No biliary dilatation.  Spleen: Borderline enlarged with scattered punctate calcifications, likely sequelae of old granulomatous disease.  Pancreas: Diffusely atrophic.  Adrenals: Unremarkable.  Kidneys and ureters: No renal or ureteral stones. No hydronephrosis.  Bowel: Postsurgical changes of the stomach. Nondilated bowel with no wall thickening.Appendix not visualized, but no inflammatory changes present in the right lower quadrant.  Bladder: Underdistended.  Reproductive organs: Normal uterus. No adnexal masses.  Lymph nodes: Unremarkable.  Peritoneum: Unremarkable.  Vessels: Prominent portosystemic collateral vessels.  Abdominal wall:  Unremarkable.  Bones: Multilevel degenerative changes of the lumbar spine. Levoconvex curvature of the lumbar spine..      Impression:        1.  Exam is limited by lack of intravenous contrast and patient body habitus.  2.  No acute intra-abdominal abnormality.  3.  Mildly nodular contour of the liver which may reflect early cirrhosis. Recommend correlation with liver function tests.  4.  Borderline splenomegaly.    Signer Name: Mark Bird MD   Signed: 11/10/2022 5:17 PM   Workstation Name: [id number]    [facility]    XR Chest 1 View [[id number]] Collected: 11/10/22 1615     Updated: 11/10/22 1617    Narrative:      EXAM:    XR Chest, 1 View     EXAM DATE:    11/10/2022 3:13 PM     CLINICAL HISTORY:    cough     TECHNIQUE:    Frontal view of the chest.     COMPARISON:    No relevant prior studies available.     FINDINGS:    Lungs:  Pulmonary vascular congestion.  Right basilar airspace  disease.    Pleural space:  Unremarkable.  No pneumothorax.    Heart:  Marked cardiomegaly.    Mediastinum:  Unremarkable.    Bones/joints:  Unremarkable.       Impression:      1.  Cardiomegaly and pulmonary vascular congestion.  2.  Right basilar airspace disease.     This report was finalized on 11/10/2022 4:15 PM by Dr. Carlos Manuel King MD.           I have personally reviewed the above radiology images and read the final radiology report on 11/10/22  ---------------------------------------------------------------------------------------------------------------------  Assessment / Plan     Active Hospital Problems    Diagnosis  POA   • **Acute on chronic renal failure (HCC) [N17.9, N18.9]  Yes       ASSESSMENT/PLAN:  -Severe sepsis criteria 2/2 undetermined etiology at present, POA  -Cellulitis of right foot and possible recurrent versus persistent septic arthritis of right knee joint , POA  -History of MRSA infection of right knee joint s/p washout  -History of pyogenic arthritis of right knee joint  07/2022  -Reported recent nausea, vomiting, and diarrhea  -Meets severe sepsis criteria with HR >90, C-RP 20.13, WBC 13.35, lactate 5.0, and procalcitonin 1.13.   -Urinalysis with no evidence of acute UTI.   -No acute intra-abdominal findings on CT abdomen/pelvis.  -CT chest with bibasilar atelectasis, but no evidence of opacities or consolidations to suggest pneumonia.  -Reports history of spontaneous MRSA infection of right knee joint requiring arthroscopic washout at [place] and treated with extended course of antibiotics. Seen by [facility] 07/2022 to have joint aspiration performed and diagnosed with pyogenic arthritis of right knee; patient denies completing course of antibiotics.   -Received IV Vancomycin and IV Merrem in ED; will continue on admission while awaiting culture results.   -Received sepsis bolus (2055 mL) in ED; continuous IV fluids ordered.   -Infectious disease and orthopedic surgery consults ordered, input and assistance much appreciated.   -Will monitor off imodium and if diarrhea reoccurs may consider further work-up for C. Diff.  -Obtain venous doppler of RLE to r/o DVT.    -Obtain respiratory panel PCR.  -Repeat a.m. CBC and C-RP.Trend lactate until normalized. Follow-up on blood cultures.     -Acute on chronic kidney disease, POA  -Creatinine upon arrival 4.41; per verbal report from ED, creatinine preivously 1.34.   -ED provider discussed with on-call nephrology recommending NS @ 125 mL/hr after sepsis bolus.  -Formal inpatient nephrology consult ordered, input and assistance much appreciated.   -No evidence of obstructive uropathy on CT abdomen/pelvis.   -Avoid nephrotoxins as able; will hold home Bumex.   -Repeat a.m. CMP.     -Documented history of CHF  -No previous echo on file; obtain a.m. TTE.     -Essential hypertensin  -BP currently stable.   -Monitor per hospital protocol.   -Review home medications once reconciled.     -Macrocytic  anemia  -Thrombocytopenia  -Possible 2/2 SUZETTE versus liver cirrhosis.   -Baseline unknown.   -Obtain iron panel, vitamin B12, and folate levels.   -Obtain peripheral blood smear.   -Repeat a.m. CBC.     -Liver cirrhosis   -Chronic hepatitis C  -Hypoalbuminemia  -Liver enzymes normal.   -May benefit from referral to hepatology/GI at discharge.  -Repeat a.m. CMP.     -Hypothyoridism  -TSH normal.   -Resume home Synthroid.     -Morbid obesity, BMI 57.25 kg/m2  -S/P gastric sleeve 2019  -Complicates all aspects of care  -------------------------------  F/E/N: Continuous NS @ 125 mL/hr. Replace electrolytes as needed. Regular diet.   DVT prophylaxis: SQ Heparin   Activity: Up with assistance; fall precautions  -------------------------------  CODE STATUS: Full    High risk secondary to sepsis of undetermined etiology at present, cellulitis of RLE, possible recurrent vs persistent septic arthritis of right knee joint, acute on CKD  -------------------------------  Expected length of stay:  INPATIENT status due to the need for care which can only be reasonably provided in an hospital setting such as aggressive/expedited ancillary services and/or consultation services, the necessity for IV medications, close physician monitoring and/or the possible need for procedures.  In such, I feel patient's risk for adverse outcomes and need for care warrant INPATIENT evaluation and predict the patient's care encounter to likely last beyond 2 midnights.     Disposition: Pending clinical course    Maria Isabel Draper PA-C  11/10/22  22:50 EST    ---------------------------------------------------------------------------------------------------------------------

## 2022-11-12 LAB
ANION GAP SERPL CALCULATED.3IONS-SCNC: 11.2 MMOL/L (ref 5–15)
BASOPHILS # BLD AUTO: 0.04 10*3/MM3 (ref 0–0.2)
BASOPHILS NFR BLD AUTO: 0.3 % (ref 0–1.5)
BUN SERPL-MCNC: 61 MG/DL (ref 6–20)
BUN/CREAT SERPL: 16.7 (ref 7–25)
CALCIUM SPEC-SCNC: 8.3 MG/DL (ref 8.6–10.5)
CHLORIDE SERPL-SCNC: 97 MMOL/L (ref 98–107)
CO2 SERPL-SCNC: 24.8 MMOL/L (ref 22–29)
CREAT SERPL-MCNC: 3.65 MG/DL (ref 0.57–1)
CRP SERPL-MCNC: 14.42 MG/DL (ref 0–0.5)
D-LACTATE SERPL-SCNC: 3.7 MMOL/L (ref 0.5–2)
DACRYOCYTES BLD QL SMEAR: NORMAL
DEPRECATED RDW RBC AUTO: 51.1 FL (ref 37–54)
EGFRCR SERPLBLD CKD-EPI 2021: 13.9 ML/MIN/1.73
EOSINOPHIL # BLD AUTO: 0.02 10*3/MM3 (ref 0–0.4)
EOSINOPHIL NFR BLD AUTO: 0.2 % (ref 0.3–6.2)
ERYTHROCYTE [DISTWIDTH] IN BLOOD BY AUTOMATED COUNT: 14.6 % (ref 12.3–15.4)
GLUCOSE SERPL-MCNC: 94 MG/DL (ref 65–99)
HCT VFR BLD AUTO: 26.5 % (ref 34–46.6)
HGB BLD-MCNC: 8.8 G/DL (ref 12–15.9)
HYPOCHROMIA BLD QL: NORMAL
IMM GRANULOCYTES # BLD AUTO: 0.36 10*3/MM3 (ref 0–0.05)
IMM GRANULOCYTES NFR BLD AUTO: 2.8 % (ref 0–0.5)
LYMPHOCYTES # BLD AUTO: 1.43 10*3/MM3 (ref 0.7–3.1)
LYMPHOCYTES NFR BLD AUTO: 11.3 % (ref 19.6–45.3)
MCH RBC QN AUTO: 32 PG (ref 26.6–33)
MCHC RBC AUTO-ENTMCNC: 33.2 G/DL (ref 31.5–35.7)
MCV RBC AUTO: 96.4 FL (ref 79–97)
MONOCYTES # BLD AUTO: 0.93 10*3/MM3 (ref 0.1–0.9)
MONOCYTES NFR BLD AUTO: 7.4 % (ref 5–12)
NEUTROPHILS NFR BLD AUTO: 78 % (ref 42.7–76)
NEUTROPHILS NFR BLD AUTO: 9.86 10*3/MM3 (ref 1.7–7)
NRBC BLD AUTO-RTO: 0.2 /100 WBC (ref 0–0.2)
PLAT MORPH BLD: NORMAL
PLATELET # BLD AUTO: 49 10*3/MM3 (ref 140–450)
PMV BLD AUTO: 10.3 FL (ref 6–12)
POTASSIUM SERPL-SCNC: 4.4 MMOL/L (ref 3.5–5.2)
QT INTERVAL: 392 MS
QTC INTERVAL: 476 MS
RBC # BLD AUTO: 2.75 10*6/MM3 (ref 3.77–5.28)
SODIUM SERPL-SCNC: 133 MMOL/L (ref 136–145)
TARGETS BLD QL SMEAR: NORMAL
WBC NRBC COR # BLD: 12.64 10*3/MM3 (ref 3.4–10.8)

## 2022-11-12 PROCEDURE — 85025 COMPLETE CBC W/AUTO DIFF WBC: CPT | Performed by: STUDENT IN AN ORGANIZED HEALTH CARE EDUCATION/TRAINING PROGRAM

## 2022-11-12 PROCEDURE — 25010000002 VANCOMYCIN 1 G RECONSTITUTED SOLUTION 1 EACH VIAL: Performed by: NURSE PRACTITIONER

## 2022-11-12 PROCEDURE — 25010000002 HEPARIN (PORCINE) PER 1000 UNITS: Performed by: HOSPITALIST

## 2022-11-12 PROCEDURE — 86140 C-REACTIVE PROTEIN: CPT | Performed by: NURSE PRACTITIONER

## 2022-11-12 PROCEDURE — 83605 ASSAY OF LACTIC ACID: CPT | Performed by: STUDENT IN AN ORGANIZED HEALTH CARE EDUCATION/TRAINING PROGRAM

## 2022-11-12 PROCEDURE — 99232 SBSQ HOSP IP/OBS MODERATE 35: CPT | Performed by: STUDENT IN AN ORGANIZED HEALTH CARE EDUCATION/TRAINING PROGRAM

## 2022-11-12 PROCEDURE — 85007 BL SMEAR W/DIFF WBC COUNT: CPT | Performed by: STUDENT IN AN ORGANIZED HEALTH CARE EDUCATION/TRAINING PROGRAM

## 2022-11-12 PROCEDURE — 99232 SBSQ HOSP IP/OBS MODERATE 35: CPT | Performed by: NURSE PRACTITIONER

## 2022-11-12 PROCEDURE — 25010000002 MORPHINE PER 10 MG: Performed by: HOSPITALIST

## 2022-11-12 PROCEDURE — 80048 BASIC METABOLIC PNL TOTAL CA: CPT | Performed by: STUDENT IN AN ORGANIZED HEALTH CARE EDUCATION/TRAINING PROGRAM

## 2022-11-12 PROCEDURE — 94799 UNLISTED PULMONARY SVC/PX: CPT

## 2022-11-12 RX ORDER — SODIUM CHLORIDE 0.9 % (FLUSH) 0.9 %
10 SYRINGE (ML) INJECTION AS NEEDED
Status: DISCONTINUED | OUTPATIENT
Start: 2022-11-12 | End: 2022-11-18 | Stop reason: HOSPADM

## 2022-11-12 RX ORDER — SODIUM CHLORIDE 0.9 % (FLUSH) 0.9 %
10 SYRINGE (ML) INJECTION EVERY 12 HOURS SCHEDULED
Status: DISCONTINUED | OUTPATIENT
Start: 2022-11-12 | End: 2022-11-18 | Stop reason: HOSPADM

## 2022-11-12 RX ADMIN — HEPARIN SODIUM 5000 UNITS: 5000 INJECTION INTRAVENOUS; SUBCUTANEOUS at 21:13

## 2022-11-12 RX ADMIN — ZINC OXIDE: 200 OINTMENT TOPICAL at 21:13

## 2022-11-12 RX ADMIN — OXYCODONE HYDROCHLORIDE 15 MG: 15 TABLET ORAL at 11:57

## 2022-11-12 RX ADMIN — HEPARIN SODIUM 5000 UNITS: 5000 INJECTION INTRAVENOUS; SUBCUTANEOUS at 08:21

## 2022-11-12 RX ADMIN — SODIUM CHLORIDE, POTASSIUM CHLORIDE, SODIUM LACTATE AND CALCIUM CHLORIDE 50 ML/HR: 600; 310; 30; 20 INJECTION, SOLUTION INTRAVENOUS at 02:14

## 2022-11-12 RX ADMIN — NYSTATIN 1 APPLICATION: 100000 CREAM TOPICAL at 08:21

## 2022-11-12 RX ADMIN — ZINC OXIDE: 200 OINTMENT TOPICAL at 08:18

## 2022-11-12 RX ADMIN — OXYCODONE HYDROCHLORIDE 15 MG: 15 TABLET ORAL at 17:54

## 2022-11-12 RX ADMIN — OXYCODONE HYDROCHLORIDE 15 MG: 15 TABLET ORAL at 08:18

## 2022-11-12 RX ADMIN — LEVOTHYROXINE SODIUM 100 MCG: 100 TABLET ORAL at 08:18

## 2022-11-12 RX ADMIN — NYSTATIN 1 APPLICATION: 100000 CREAM TOPICAL at 21:13

## 2022-11-12 RX ADMIN — OXYCODONE HYDROCHLORIDE 15 MG: 15 TABLET ORAL at 21:13

## 2022-11-12 RX ADMIN — VANCOMYCIN HYDROCHLORIDE 1000 MG: 1 INJECTION, POWDER, LYOPHILIZED, FOR SOLUTION INTRAVENOUS at 17:54

## 2022-11-12 RX ADMIN — MORPHINE SULFATE 2 MG: 2 INJECTION, SOLUTION INTRAMUSCULAR; INTRAVENOUS at 10:36

## 2022-11-12 RX ADMIN — Medication 10 ML: at 21:13

## 2022-11-12 RX ADMIN — PANTOPRAZOLE SODIUM 40 MG: 40 TABLET, DELAYED RELEASE ORAL at 08:18

## 2022-11-12 RX ADMIN — MORPHINE SULFATE 2 MG: 2 INJECTION, SOLUTION INTRAMUSCULAR; INTRAVENOUS at 06:49

## 2022-11-12 RX ADMIN — MORPHINE SULFATE 2 MG: 2 INJECTION, SOLUTION INTRAMUSCULAR; INTRAVENOUS at 02:13

## 2022-11-12 RX ADMIN — FLUTICASONE PROPIONATE 2 SPRAY: 50 SPRAY, METERED NASAL at 08:18

## 2022-11-12 RX ADMIN — Medication 10 ML: at 08:18

## 2022-11-12 NOTE — PLAN OF CARE
Goal Outcome Evaluation:      VSS at this time. Patient resting comfortably in bed at this time. No overt changes this shift. Safety maintained, call light in reach.

## 2022-11-12 NOTE — PROGRESS NOTES
PROGRESS NOTE         Patient Identification:  Name:  Malina Velasquez  Age:  57 y.o.  Sex:  female  :  1965  MRN:  [id number]  Visit Number:  [id number]  Primary Care Provider:  Jeremiah Topete MD         LOS: 2 days       ----------------------------------------------------------------------------------------------------------------------  Subjective       Chief Complaints:    Weakness - Generalized (Pt states dr sent her here for high wbc and kidney function )        Interval History:      Patient resting in bed this morning.  Low-grade fever overnight of 99.3.  WBC slightly worse at 12.64.  GI PCR negative.  Urinalysis from 2022 unremarkable.  Right lower extremity venous duplex negative for DVT.  Right lower extremity erythema improved but continues with significant edema.    Review of Systems:    Constitutional: no fever, chills and night sweats.  Generalized fatigue.  Eyes: no eye drainage, itching or redness.  HEENT: no mouth sores, dysphagia or nose bleed.  Respiratory: no for shortness of breath, cough or production of sputum.  Cardiovascular: no chest pain, no palpitations, no orthopnea.  Gastrointestinal: no nausea, vomiting or diarrhea. No abdominal pain, hematemesis or rectal bleeding.  Genitourinary: no dysuria or polyuria.  Hematologic/lymphatic: no lymph node abnormalities, no easy bruising or easy bleeding.  Musculoskeletal: Right lower extremity pain.  Skin: No rash and no itching.  Neurological: no loss of consciousness, no seizure, no headache.  Behavioral/Psych: no depression or suicidal ideation.  Endocrine: no hot flashes.  Immunologic: negative.    ----------------------------------------------------------------------------------------------------------------------      Objective       [facility] Meds:  fluticasone, 2 spray, Nasal, Daily  heparin (porcine), 5,000 Units, Subcutaneous, Q12H  levothyroxine, 100 mcg, Oral, Daily  metoprolol tartrate, 12.5 mg,  Oral, Q12H  nystatin, 1 application, Topical, BID  oxyCODONE, 15 mg, Oral, 4x Daily  pantoprazole, 40 mg, Oral, QAM  sodium chloride, 10 mL, Intravenous, Q12H  sodium chloride, 10 mL, Intravenous, Q12H  vancomycin, 1,000 mg, Intravenous, Q24H  zinc oxide, , Topical, BID      lactated ringers, 50 mL/hr, Last Rate: 50 mL/hr (11/12/22 0214)      ----------------------------------------------------------------------------------------------------------------------    Vital Signs:  Temp:  [98.5 °F (36.9 °C)-99.8 °F (37.7 °C)] 98.9 °F (37.2 °C)  Heart Rate:  [] 93  Resp:  [18-20] 18  BP: ()/() 112/56  Mean Arterial Pressure (Non-Invasive) for the past 24 hrs (Last 3 readings):   Noninvasive MAP (mmHg)   11/12/22 0600 67   11/12/22 0500 73   11/12/22 0400 64     SpO2 Percentage    11/12/22 0400 11/12/22 0500 11/12/22 0600   SpO2: 92% 99% 93%     SpO2:  [92 %-99 %] 93 %  on   ;   Device (Oxygen Therapy): room air    Body mass index is 88.83 kg/m².  Wt Readings from Last 3 Encounters:   11/11/22 (!) 281 kg (619 lb 1.6 oz)        Intake/Output Summary (Last 24 hours) at 11/12/2022 1100  Last data filed at 11/12/2022 0753  Gross per 24 hour   Intake 120 ml   Output 1600 ml   Net -1480 ml     Diet Regular; Cardiac, Renal  ----------------------------------------------------------------------------------------------------------------------      Physical Exam:    Constitutional:  Well-developed and well-nourished.  No respiratory distress.   Sitting up in bed.  On room air with no apparent distress.    HENT:  Head: Normocephalic and atraumatic.  Mouth:  Moist mucous membranes.    Eyes:  Conjunctivae and EOM are normal.  No scleral icterus.  Neck:  Neck supple.  No JVD present.    Cardiovascular:  Normal rate, regular rhythm and normal heart sounds with no murmur. No edema.  Pulmonary/Chest:  No respiratory distress, no wheezes, no crackles, with normal breath sounds and good air movement.  Abdominal:  Soft.   Bowel sounds are normal.  No distension and no tenderness.   Musculoskeletal: Bilateral lower extremity lymphedema.  Right lower extremity edema with mild erythema and warmth, pain with palpation.  Neurological:  Alert and oriented to person, place, and time.  No facial droop.  No slurred speech.   Skin:  Skin is warm and dry.  No rash noted.  No pallor. Bilateral lower extremity lymphedema.  Right lower extremity edema with mild erythema and warmth, pain with palpation.  Psychiatric:  Normal mood and affect.  Behavior is normal.      ----------------------------------------------------------------------------------------------------------------------  Results from last 7 days   Lab Units 11/10/22  1433   TROPONIN T ng/mL 0.016           Results from last 7 days   Lab Units 11/12/22  1015 11/12/22  0832 11/12/22  0433 11/11/22  1226 11/11/22  0648 11/11/22  0045 11/10/22  1800 11/10/22  1546 11/10/22  1433   CRP mg/dL 14.42*  --   --   --   --  17.21*  --   --  20.13*   LACTATE mmol/L  --  3.7*  --  3.9* 4.6* 5.0*   < >  --  5.0*   WBC 10*3/mm3  --   --  12.64*  --  9.70 10.93*  --   --  13.35*   HEMOGLOBIN g/dL  --   --  8.8*  --  8.7* 9.0*  --   --  9.5*   HEMATOCRIT %  --   --  26.5*  --  27.3* 28.1*  --   --  29.4*   MCV fL  --   --  96.4  --  101.5* 99.3*  --   --  97.7*   MCHC g/dL  --   --  33.2  --  31.9 32.0  --   --  32.3   PLATELETS 10*3/mm3  --   --  49*  --  43* 47*  --   --  56*   INR   --   --   --   --   --   --   --  1.12*  --     < > = values in this interval not displayed.     Results from last 7 days   Lab Units 11/12/22  0433 11/11/22  0648 11/11/22  0045 11/10/22  1433   SODIUM mmol/L 133* 130* 132* 133*   POTASSIUM mmol/L 4.4 5.6* 4.4 5.2   CHLORIDE mmol/L 97* 96* 95* 94*   CO2 mmol/L 24.8 18.2* 21.0* 25.9   BUN mg/dL 61* 69* 68* 67*   CREATININE mg/dL 3.65* 4.30* 4.53* 4.41*   CALCIUM mg/dL 8.3* 8.5* 8.8 9.1   GLUCOSE mg/dL 94 112* 125* 114*   ALBUMIN g/dL  --  1.58* 2.11* 2.68*   BILIRUBIN  mg/dL  --  0.7 0.7 0.8   ALK PHOS U/L  --  101 116 134*   AST (SGOT) U/L  --  34* 25 22   ALT (SGPT) U/L  --  10 11 9   Estimated Creatinine Clearance: 41.3 mL/min (A) (by C-G formula based on SCr of 3.65 mg/dL (H)).  No results found for: AMMONIA    Hemoglobin A1C   Date/Time Value Ref Range Status   11/10/2022 1433 4.70 (L) 4.80 - 5.60 % Final     Lab Results   Component Value Date    HGBA1C 4.70 (L) 11/10/2022     Lab Results   Component Value Date    TSH 0.960 11/10/2022       Blood Culture   Date Value Ref Range Status   11/10/2022 No growth at 24 hours  Preliminary   11/10/2022 No growth at 24 hours  Preliminary     No results found for: URINECX  No results found for: WOUNDCX  No results found for: STOOLCX  No results found for: RESPCX  Pain Management Panel     Pain Management Panel Latest Ref Rng & Units 11/11/2022    AMPHETAMINES SCREEN, URINE Negative Negative    BARBITURATES SCREEN Negative Negative    BENZODIAZEPINE SCREEN, URINE Negative Negative    BUPRENORPHINEUR Negative Negative    COCAINE SCREEN, URINE Negative Negative    METHADONE SCREEN, URINE Negative Negative    METHAMPHETAMINEUR Negative Negative            ----------------------------------------------------------------------------------------------------------------------  Imaging Results (Last 24 Hours)     ** No results found for the last 24 hours. **          ----------------------------------------------------------------------------------------------------------------------    Pertinent Infectious Disease Results    Lactic acid 5.0 on admission.  Procalcitonin 0.87.  Urinalysis unremarkable.  COVID-19 and influenza PCR negative.  Blood cultures from 11/10/2022 show no growth thus far.  CT of the abdomen pelvis from 11/10/2022 reports no acute intra-abdominal abnormality, mildly nodular contour of the liver may represent early cirrhosis, borderline splenomegaly, CT of the chest from 11/10/2022 reports no acute cardiopulmonary disease,  bibasilar subsegmental atelectasis, small hiatal hernia.  Right lower extremity CT reports advanced cellulitis of the right lower extremity extending from the level of the proximal thigh through the foot with maximum thickness of the inflammatory change and stranding medially measuring greater than 15 cm at the level of the distal thigh and knee, no drainable fluid collection no subcutaneous gas, no focal erosive changes to suggest osteomyelitis at this time.           Assessment/Plan       Assessment     Septic shock with lactic acid greater than 4 on admission  History of right knee pyogenic arthritis with MRSA with superimposed right knee chronic osteomyelitis  Right lower extremity cellulitis and lymphedema        Plan      Patient resting in bed this morning.  Low-grade fever overnight of 99.3.  WBC slightly worse at 12.64.  GI PCR negative.  Urinalysis from 11/11/2022 unremarkable.  Right lower extremity venous duplex negative for DVT.  Right lower extremity erythema improved but continues with significant edema.    Based on previous culture with MRSA from joint aspiration at outside facility recommend to continue vancomycin monotherapy.  Patient will require prolonged course of antibiotic therapy for chronic osteomyelitis of the right knee secondary to complication from recurrent partially treated right knee septic arthritis and will need prolonged course of IV antibiotic therapy x8 weeks followed by oral suppressive therapy with very grim prognosis regarding clearing the infection without repetitive washout and aspiration.  Poor orthopedic surgery risk outweighs benefit for surgical intervention at this time.    Code Status:   Code Status and Medical Interventions:   Ordered at: 11/10/22 1939     Level Of Support Discussed With:    Patient     Code Status (Patient has no pulse and is not breathing):    CPR (Attempt to Resuscitate)     Medical Interventions (Patient has pulse or is breathing):    Full Support        RUKHSANA Kennedy  11/12/22  11:00 EST     Electronically signed by RUKHSANA Kennedy, 11/12/22, 11:06 AM EST.

## 2022-11-12 NOTE — PROGRESS NOTES
[facility] HOSPITALIST PROGRESS NOTE     Patient Identification:  Name:  aMlina Velasquez  Age:  57 y.o.  Sex:  female  :  1965  MRN:  [id number]  Visit Number:  [id number]  ROOM: [address]     Primary Care Provider:  Jeremiah Topete MD    Length of stay in inpatient status:  2    Subjective     Chief Compliant:    Chief Complaint   Patient presents with   • Weakness - Generalized     Pt states dr sent her here for high wbc and kidney function        History of Presenting Illness:    Patient seen and examined, no family present at bedside. She reports she is having significant right lower extremity pain this morning, but improved from yesterday, and is waiting for her pain medication to take effect. She denies chest pain or shortness of breath. Says she has been told she had cirrhosis in the past and saw a GI doctor in [place]. She wants to possibly seek treatment for the hepatitis C, which she says she contracted from getting a tattoo in .    Objective     Current Hospital Meds:fluticasone, 2 spray, Nasal, Daily  heparin (porcine), 5,000 Units, Subcutaneous, Q12H  levothyroxine, 100 mcg, Oral, Daily  metoprolol tartrate, 12.5 mg, Oral, Q12H  nystatin, 1 application, Topical, BID  oxyCODONE, 15 mg, Oral, 4x Daily  pantoprazole, 40 mg, Oral, QAM  sodium chloride, 10 mL, Intravenous, Q12H  sodium chloride, 10 mL, Intravenous, Q12H  vancomycin, 1,000 mg, Intravenous, Q24H  zinc oxide, , Topical, BID    lactated ringers, 50 mL/hr, Last Rate: 50 mL/hr (22 0214)        Current Antimicrobial Therapy:  Anti-Infectives (From admission, onward)    Ordered     Dose/Rate Route Frequency Start Stop    22 1628  vancomycin (VANCOCIN) 1,000 mg in sodium chloride 0.9 % 250 mL IVPB        Ordering Provider: Tameka Murguia APRN    1,000 mg  over 60 Minutes Intravenous Every 24 Hours 22 1800 23 1759    11/10/22 1938  meropenem (MERREM) 1 g in sodium chloride 0.9 % 100 mL IVPB-VTB         Ordering Provider: Miguel Licea MD    1 g  over 30 Minutes Intravenous Once 11/10/22 2000 11/10/22 2038    11/10/22 1539  aztreonam (AZACTAM) 2 g in sodium chloride 0.9 % 100 mL IVPB-VTB        Ordering Provider: Joyce Lindo PA    2 g  200 mL/hr over 30 Minutes Intravenous Once 11/10/22 1541 11/10/22 2009    11/10/22 1539  vancomycin 2500 mg/500 mL 0.9% NS IVPB ([facility])        Ordering Provider: Joyce Lindo PA    2,500 mg  over 120 Minutes Intravenous Once 11/10/22 1541 11/10/22 1950        Current Diuretic Therapy:  Diuretics (From admission, onward)    None        ----------------------------------------------------------------------------------------------------------------------  Vital Signs:  Temp:  [98.5 °F (36.9 °C)-99.8 °F (37.7 °C)] 98.9 °F (37.2 °C)  Heart Rate:  [] 95  Resp:  [12-18] 12  BP: ()/() 118/62  SpO2:  [91 %-99 %] 96 %  on   ;   Device (Oxygen Therapy): room air  Body mass index is 88.83 kg/m².    Wt Readings from Last 3 Encounters:   11/11/22 (!) 281 kg (619 lb 1.6 oz)     Intake & Output (last 3 days)       11/09 0701  11/10 0700 11/10 0701  11/11 0700 11/11 0701  11/12 0700 11/12 0701  11/13 0700    P.O.   360 120    IV Piggyback  600      Total Intake(mL/kg)  600 (2.1) 360 (1.3) 120 (0.4)    Urine (mL/kg/hr)  200 1600 (0.2)     Stool   0     Total Output  200 1600     Net  +400 -1240 +120            Stool Unmeasured Occurrence   1 x         Diet Regular; Cardiac, Renal  ----------------------------------------------------------------------------------------------------------------------  Physical exam:   Constitutional:  Well-developed and well-nourished.  No acute distress.      HENT:  Head:  Normocephalic and atraumatic.    Cardiovascular:  Normal rate, regular rhythm and normal heart sounds with no murmur.  Pulmonary/Chest:  No respiratory distress, no wheezes, no crackles, with normal breath sounds and good air movement.  Abdominal:  Soft. No  distension and no tenderness.   Musculoskeletal: Right lower extremity is tender to palpation from knee down. No deformity.    Neurological:  Awake, alert, no focal deficit on gross examination. No slurred speech or facial droop.  Skin:  Skin is warm and dry. Erythema of right lower extremity is mild, seems to be improving.  Peripheral vascular:  No cyanosis. Bilateral lymphedema noted, R>L. Extremities are warm and well perfused.  Psychiatric: Appropriate mood and affect    ----------------------------------------------------------------------------------------------------------------------  Results from last 7 days   Lab Units 11/12/22  1015 11/12/22  0832 11/12/22  0433 11/11/22  1226 11/11/22  0648 11/11/22  0045 11/10/22  1800 11/10/22  1546 11/10/22  1433   CRP mg/dL 14.42*  --   --   --   --  17.21*  --   --  20.13*   LACTATE mmol/L  --  3.7*  --  3.9* 4.6* 5.0*   < >  --  5.0*   WBC 10*3/mm3  --   --  12.64*  --  9.70 10.93*  --   --  13.35*   HEMOGLOBIN g/dL  --   --  8.8*  --  8.7* 9.0*  --   --  9.5*   HEMATOCRIT %  --   --  26.5*  --  27.3* 28.1*  --   --  29.4*   MCV fL  --   --  96.4  --  101.5* 99.3*  --   --  97.7*   MCHC g/dL  --   --  33.2  --  31.9 32.0  --   --  32.3   PLATELETS 10*3/mm3  --   --  49*  --  43* 47*  --   --  56*   INR   --   --   --   --   --   --   --  1.12*  --     < > = values in this interval not displayed.         Results from last 7 days   Lab Units 11/12/22  0433 11/11/22  0648 11/11/22  0045 11/10/22  1433   SODIUM mmol/L 133* 130* 132* 133*   POTASSIUM mmol/L 4.4 5.6* 4.4 5.2   CHLORIDE mmol/L 97* 96* 95* 94*   CO2 mmol/L 24.8 18.2* 21.0* 25.9   BUN mg/dL 61* 69* 68* 67*   CREATININE mg/dL 3.65* 4.30* 4.53* 4.41*   CALCIUM mg/dL 8.3* 8.5* 8.8 9.1   GLUCOSE mg/dL 94 112* 125* 114*   ALBUMIN g/dL  --  1.58* 2.11* 2.68*   BILIRUBIN mg/dL  --  0.7 0.7 0.8   ALK PHOS U/L  --  101 116 134*   AST (SGOT) U/L  --  34* 25 22   ALT (SGPT) U/L  --  10 11 9   Estimated Creatinine  Clearance: 41.3 mL/min (A) (by C-G formula based on SCr of 3.65 mg/dL (H)).  No results found for: AMMONIA  Results from last 7 days   Lab Units 11/10/22  1433   TROPONIN T ng/mL 0.016             Hemoglobin A1C   Date/Time Value Ref Range Status   11/10/2022 1433 4.70 (L) 4.80 - 5.60 % Final     Lab Results   Component Value Date    TSH 0.960 11/10/2022     No results found for: PREGTESTUR, PREGSERUM, HCG, HCGQUANT  Pain Management Panel     Pain Management Panel Latest Ref Rng & Units 11/11/2022    AMPHETAMINES SCREEN, URINE Negative Negative    BARBITURATES SCREEN Negative Negative    BENZODIAZEPINE SCREEN, URINE Negative Negative    BUPRENORPHINEUR Negative Negative    COCAINE SCREEN, URINE Negative Negative    METHADONE SCREEN, URINE Negative Negative    METHAMPHETAMINEUR Negative Negative        Brief Urine Lab Results  (Last result in the past 365 days)      Color   Clarity   Blood   Leuk Est   Nitrite   Protein   CREAT   Urine HCG        11/11/22 1047 Yellow   Clear   Negative   Negative   Negative   Negative               Blood Culture   Date Value Ref Range Status   11/10/2022 No growth at 24 hours  Preliminary   11/10/2022 No growth at 24 hours  Preliminary     No results found for: URINECX  No results found for: WOUNDCX  No results found for: STOOLCX  No results found for: RESPCX  No results found for: AFBCX  Results from last 7 days   Lab Units 11/12/22  1015 11/12/22  0832 11/11/22  1226 11/11/22  0648 11/11/22  0045 11/10/22  2209 11/10/22  1800 11/10/22  1433   PROCALCITONIN ng/mL  --   --   --   --  0.87*  --   --  1.13*   LACTATE mmol/L  --  3.7* 3.9* 4.6* 5.0* 5.1* 4.2* 5.0*   CRP mg/dL 14.42*  --   --   --  17.21*  --   --  20.13*       I have personally looked at the labs and they are summarized above.  ----------------------------------------------------------------------------------------------------------------------  Detailed radiology reports for the last 24 hours:  Imaging Results (Last 24  Hours)     ** No results found for the last 24 hours. **        Assessment & Plan      #Severe sepsis criteria met likely secondary to cellulitis of right lower extremity, present on admission   #Cellulitis of right foot and possible recurrent versus persistent septic arthritis of right knee joint , present on admission   #History of MRSA infection of right knee joint s/p washout  #History of pyogenic arthritis of right knee joint 07/2022  #Reported recent nausea, vomiting, and diarrhea  -Met severe sepsis criteria with HR >90, C-RP 20.13, WBC 13.35, lactate 5.0, and procalcitonin 1.13.   -Urinalysis with no evidence of acute UTI. No evidence of pneumonia on chest x-ray. No acute intra-abdominal findings on CT abdomen/pelvis.  - Continue vancomycin monotherapy per ID recommendations  - Appreciate ID and Orthopedic Surgery recommendations  - GI PCR negative  - Follow up blood cultures     #Acute on chronic kidney disease, present on admission   #Anemia and thrombocytopenia  - Creatinine 4.41 at admission, per ED report creatinine was previously 1.34  - Nephrology consulted, appreciate assistance  - Creatinine is slowly improving, down to 3.65 today.   -Per Nephrology HUS is a possible concern with her anemia/thrombocytopenia--peripheral smear ordered. Per Nephrology's note there was a verbal report of no schistocytes seen. LDH mildly elevated, haptoglobin within normal limits. May also be secondary to cirrhosis vs SUZETTE. Repeat CBC in AM.   - Per discussion with patient she has been told in the past she has cirrhosis, and I think this is high on the differential diagnosis for her anemia/thrombocytopenia.    #Reported history of CHF  - Echo ordered, showed grade I diastolic dysfunction, mild LV concentric hypertrophy, mild aortic valve stenosis    #Essential hypertension  - Monitor BP per protocol  - Continue home metoprolol    #Liver cirrhosis  #Chronic hepatitis C  #Hypoalbuminemia  - Liver enzymes normal at  admission. Would likely benefit from referral back to GI at discharge (patient reports she saw a GI doctor years ago in [place]).    #Hypothyroidism  - TSH normal at admission. Continue home levothyroxine.  Edited by: Ann Marie Ochoa DO at 11/12/2022 1133    VTE Prophylaxis:   Mechanical Order History:     None      Pharmalogical Order History:      Ordered     Dose Route Frequency Stop    11/10/22 2214  heparin (porcine) 5000 UNIT/ML injection 5,000 Units         5,000 Units SC Every 12 Hours Scheduled --                Dispo: pending clinical course    Ann Marie Ochoa DO  [facility]  11/12/22  11:34 EST

## 2022-11-12 NOTE — PROGRESS NOTES
Nephrology Progress Note      Subjective     Patient feels better today, no chest pain or shortness of breath.     Objective       Vital signs :     Temp:  [98.5 °F (36.9 °C)-99.8 °F (37.7 °C)] 99.3 °F (37.4 °C)  Heart Rate:  [] 91  Resp:  [18-20] 18  BP: ()/() 93/60    Intake/Output                       11/10/22 0701 - 11/11/22 0700 11/11/22 0701 - 11/12/22 0700     2837-0518 3789-4136 Total 1015-2599 2309-6688 Total                 Intake    P.O.  --  -- --  360  -- 360    IV Piggyback  --  600 600  --  -- --    Total Intake -- 600 600 360 -- 360       Output    Urine  --  200 200  700  900 1600    Total Output -- 200 200            Physical Exam:    General Appearance : not in acute distress  Lungs : clear to auscultation, respirations regular  Heart :  regular rhythm & normal rate, normal S1, S2 and no murmur, no rub  Abdomen : soft, non distended  Extremities : 1+ edema  Neurologic :   orientated to person, place, time and situation, Grossly no focal deficits    Laboratory Data :     Albumin Albumin   Date Value Ref Range Status   11/11/2022 1.58 (L) 3.50 - 5.20 g/dL Final   11/11/2022 2.11 (L) 3.50 - 5.20 g/dL Final   11/10/2022 2.68 (L) 3.50 - 5.20 g/dL Final      Magnesium No results found for: MG       PTH               No results found for: PTH    CBC and coagulation:  Results from last 7 days   Lab Units 11/12/22  0433 11/11/22  1226 11/11/22  0648 11/11/22  0045 11/10/22  1800 11/10/22  1546 11/10/22  1433   PROCALCITONIN ng/mL  --   --   --  0.87*  --   --  1.13*   LACTATE mmol/L  --  3.9* 4.6* 5.0*   < >  --  5.0*   CRP mg/dL  --   --   --  17.21*  --   --  20.13*   WBC 10*3/mm3 12.64*  --  9.70 10.93*  --   --  13.35*   HEMOGLOBIN g/dL 8.8*  --  8.7* 9.0*  --   --  9.5*   HEMATOCRIT % 26.5*  --  27.3* 28.1*  --   --  29.4*   MCV fL 96.4  --  101.5* 99.3*  --   --  97.7*   MCHC g/dL 33.2  --  31.9 32.0  --   --  32.3   PLATELETS 10*3/mm3 49*  --  43* 47*  --   --  56*    INR   --   --   --   --   --  1.12*  --     < > = values in this interval not displayed.     Acid/base balance:      Renal and electrolytes:    Results from last 7 days   Lab Units 11/12/22  0433 11/11/22  0648 11/11/22  0045 11/10/22  1433   SODIUM mmol/L 133* 130* 132* 133*   POTASSIUM mmol/L 4.4 5.6* 4.4 5.2   CHLORIDE mmol/L 97* 96* 95* 94*   CO2 mmol/L 24.8 18.2* 21.0* 25.9   BUN mg/dL 61* 69* 68* 67*   CREATININE mg/dL 3.65* 4.30* 4.53* 4.41*   CALCIUM mg/dL 8.3* 8.5* 8.8 9.1     Estimated Creatinine Clearance: 41.3 mL/min (A) (by C-G formula based on SCr of 3.65 mg/dL (H)).  @GFRCG:3@   Liver and pancreatic function:  Results from last 7 days   Lab Units 11/11/22  0648 11/11/22  0045 11/10/22  1546 11/10/22  1433   ALBUMIN g/dL 1.58* 2.11*  --  2.68*   BILIRUBIN mg/dL 0.7 0.7  --  0.8   ALK PHOS U/L 101 116  --  134*   AST (SGOT) U/L 34* 25  --  22   ALT (SGPT) U/L 10 11  --  9   LIPASE U/L  --   --  28  --          Cardiac:      Liver and pancreatic function:  Results from last 7 days   Lab Units 11/11/22  0648 11/11/22  0045 11/10/22  1546 11/10/22  1433   ALBUMIN g/dL 1.58* 2.11*  --  2.68*   BILIRUBIN mg/dL 0.7 0.7  --  0.8   ALK PHOS U/L 101 116  --  134*   AST (SGOT) U/L 34* 25  --  22   ALT (SGPT) U/L 10 11  --  9   LIPASE U/L  --   --  28  --        Medications :     fluticasone, 2 spray, Nasal, Daily  heparin (porcine), 5,000 Units, Subcutaneous, Q12H  levothyroxine, 100 mcg, Oral, Daily  metoprolol tartrate, 12.5 mg, Oral, Q12H  nystatin, 1 application, Topical, BID  oxyCODONE, 15 mg, Oral, 4x Daily  pantoprazole, 40 mg, Oral, QAM  sodium chloride, 10 mL, Intravenous, Q12H  vancomycin, 1,000 mg, Intravenous, Q24H  zinc oxide, , Topical, BID      lactated ringers, 50 mL/hr, Last Rate: 50 mL/hr (11/12/22 0214)          Assessment & Plan     1. SUZETTE, non ogliguric  2. Hyponatremia likely hypovolumic presumed chronic  3. Anion gap metabolic acidosis  4. Hyperkalemia  5. Anemia and thrombocytopenia  6.  History of hep C induced liver Cirrhosis    Cr improving significantly, non oliguric. Peripheral film was negative for schistocytes per verbal report from lab yesterday.   Metabolic acidosis has improved, mild persistent hyponatremia. Continue on LR @ 75cc/h.   Recommend hematology consult for thrombocytopenia.     SUZETTE likley due to multifactorial, including ATN from prolonged pre renal state with concomitant use of  NSAIDS, and ATN from sepsis.   Other etiology to consider is HUS, and given the history of anemia, thrombocytopenia and hyperkalemia, needs to r/o TMA. Although anemia and thrombocytopenia can be due to cirrhosis with splenomegaly.  Baseline Cr unknown, admitted with 4.5  No hematuria, no proteinuria on UA  No hydronephrosis on CT        Colin Chris MD  11/12/22  07:31 EST

## 2022-11-12 NOTE — PROGRESS NOTES
Inpatient Progress Note  Malina Velasquez  Date: 11/12/22  MRN: [id number]      Subjective:   Orthopedic surgery is following the patient secondary to right lower extremity cellulitis as well as possibility of chronic osteomyelitis of the right knee.  Patient had work-up in July that failed to demonstrate any indications of septic joint, this work-up was done at outside facility.  Was some findings on CT imaging of some osteolytic type lesions which could be indicative of a possible chronic osteomyelitis of the right knee.  Infectious disease has been following the patient.  She notes she feels better today.  Erythema is improving to the right lower extremity.  Lactate is trending down.  Repeat CRP is pending.  White blood cell count has slightly trended up.        Objective:    Vitals:    11/12/22 0500 11/12/22 0600 11/12/22 0800 11/12/22 0818   BP: 100/68 93/60  112/56   Pulse: 96 91  93   Resp:       Temp:   98.9 °F (37.2 °C)    TempSrc:   Oral    SpO2: 99% 93%     Weight:       Height:              Physical Exam:  Constitutional: Morbidly obese female.  No respiratory distress.        Musculoskeletal: Upon examination of the patient's right knee no cellulitis or erythema is noted.  Surgical incision noted.  This appears to have healed well.  Exam limited secondary to patient body habitus.  Erythema noted to the dorsum of the right foot appears to improve.  Positive pedal pulses noted.  She is able to dorsiflex and plantarflex the right ankle.  No foot drop abnormality noted.      Labs:    Results from last 7 days   Lab Units 11/12/22  0832 11/12/22  0433 11/11/22  1226 11/11/22  0648 11/11/22  0045 11/10/22  1800 11/10/22  1546 11/10/22  1433   CRP mg/dL  --   --   --   --  17.21*  --   --  20.13*   LACTATE mmol/L 3.7*  --  3.9* 4.6* 5.0*   < >  --  5.0*   WBC 10*3/mm3  --  12.64*  --  9.70 10.93*  --   --  13.35*   HEMOGLOBIN g/dL  --  8.8*  --  8.7* 9.0*  --   --  9.5*   HEMATOCRIT %  --  26.5*  --  27.3* 28.1*   --   --  29.4*   MCV fL  --  96.4  --  101.5* 99.3*  --   --  97.7*   MCHC g/dL  --  33.2  --  31.9 32.0  --   --  32.3   PLATELETS 10*3/mm3  --  49*  --  43* 47*  --   --  56*   INR   --   --   --   --   --   --  1.12*  --     < > = values in this interval not displayed.         Results from last 7 days   Lab Units 11/12/22  0433 11/11/22  0648 11/11/22  0045 11/10/22  1433   SODIUM mmol/L 133* 130* 132* 133*   POTASSIUM mmol/L 4.4 5.6* 4.4 5.2   CHLORIDE mmol/L 97* 96* 95* 94*   CO2 mmol/L 24.8 18.2* 21.0* 25.9   BUN mg/dL 61* 69* 68* 67*   CREATININE mg/dL 3.65* 4.30* 4.53* 4.41*   CALCIUM mg/dL 8.3* 8.5* 8.8 9.1   GLUCOSE mg/dL 94 112* 125* 114*   ALBUMIN g/dL  --  1.58* 2.11* 2.68*   BILIRUBIN mg/dL  --  0.7 0.7 0.8   ALK PHOS U/L  --  101 116 134*   AST (SGOT) U/L  --  34* 25 22   ALT (SGPT) U/L  --  10 11 9   Estimated Creatinine Clearance: 41.3 mL/min (A) (by C-G formula based on SCr of 3.65 mg/dL (H)).  No results found for: AMMONIA  Results from last 7 days   Lab Units 11/10/22  1433   TROPONIN T ng/mL 0.016         Hemoglobin A1C   Date Value Ref Range Status   11/10/2022 4.70 (L) 4.80 - 5.60 % Final     Lab Results   Component Value Date    TSH 0.960 11/10/2022         Pain Management Panel     Pain Management Panel Latest Ref Rng & Units 11/11/2022    AMPHETAMINES SCREEN, URINE Negative Negative    BARBITURATES SCREEN Negative Negative    BENZODIAZEPINE SCREEN, URINE Negative Negative    BUPRENORPHINEUR Negative Negative    COCAINE SCREEN, URINE Negative Negative    METHADONE SCREEN, URINE Negative Negative    METHAMPHETAMINEUR Negative Negative          Blood Culture   Date Value Ref Range Status   11/10/2022 No growth at 24 hours  Preliminary   11/10/2022 No growth at 24 hours  Preliminary     No results found for: URINECX  No results found for: WOUNDCX  No results found for: STOOLCX              Radiology:  Imaging Results (Last 72 Hours)     Procedure Component Value Units Date/Time    US Venous  Doppler Lower Extremity Right (duplex) [[id number]] Collected: 11/11/22 0958     Updated: 11/11/22 1016    Narrative:      US VENOUS DOPPLER LOWER EXTREMITY RIGHT (DUPLEX)-     CLINICAL INDICATION: Painful RLE, obese, bed bound; N19-Unspecified  kidney failure        COMPARISON: None available      TECHNIQUE: Color Doppler imaging was used with compression and  augmentation to evaluate the right lower extremity deep venous system.     FINDINGS:   There is patent spontaneous flow from the common femoral vein through  the posterior tibial veins.  There was no internal clot or area of noncompressibility in the right  lower extremity.  Normal augmentation was elicited where applicable.          Impression:      No DVT in the right lower extremity on today's exam.      This report was finalized on 11/11/2022 9:58 AM by Dr. Drew Cm MD.       CT Lower Extremity Right Without Contrast [[id number]] Collected: 11/10/22 2326     Updated: 11/10/22 2328    Narrative:      CT LE RT WO    INDICATION:    Redness and swelling of the right lower extremity. Renal failure. Possible septic arthritis.    TECHNIQUE:   CT of the right lower extremity without IV contrast. Coronal and sagittal reconstructions were obtained.  Radiation dose reduction techniques included automated exposure control or exposure modulation based on body size. Count of known CT and cardiac nuc  med studies performed in previous 12 months: 0.      COMPARISON:    None available.    FINDINGS:  There is motion degradation. Study sensitivity limited by noncontrast technique. Included pelvis shows moderate to large stool ball in the rectum. Decompressed bladder. No drainable fluid collection.    Advanced degenerative change of the right knee. Small nonspecific knee joint effusion. Degenerative change in the right ankle and right foot. Nonspecific enlarged inguinal lymph nodes are present. These may be reactive but should be correlated  clinically. Beginning at the  level of the right thigh there is moderate induration of the subcutaneous fat laterally. This becomes more severe distally and there is overlying skin thickening. By the level of the mid to distal thigh, there is advanced  injection of the subcutaneous fat and more confluent severe inflammatory change of the subcutaneous fat medially with associated overlying skin thickening. Maximum thickness is up to greater than 15 cm medially at the level of the knee. There is no  well-defined drainable fluid collection identified. Below the level of the knee there is advanced subcutaneous fat stranding extending to the level of the ankle with associated overlying skin thickening. There is extension into the right foot. No  subcutaneous gas identified. No focal erosive changes. Imaging features are nonspecific but suggestive of advanced right lower extremity cellulitis. Although there is some injection of the fat within the muscular planes of the thigh anterior greater  extent the distal lower extremity, and the majority of the inflammatory change involves the subcutaneous fat and extends to the level of the surrounding fascia.      Impression:        1. Imaging findings most likely reflecting advanced cellulitis of the right lower extremity extending from the level of the proximal thigh through the foot with maximum thickness of the inflammatory change and stranding medially measuring greater than 15  cm at the level of the distal thigh and knee. No distinct drainable fluid collection. No subcutaneous gas. No focal erosive changes to suggest osteomyelitis at this time. If there is persistent clinical concern for osteomyelitis, MRI could be performed  for further evaluation.    Signer Name: Faustino Lima MD   Signed: 11/10/2022 11:26 PM   Workstation Name: [id number]    [facility]    CT Chest Without Contrast Diagnostic [[id number]] Collected: 11/10/22 1720     Updated: 11/10/22 1723    Narrative:      CT  Chest WO    CLINICAL HISTORY: soa.    COMPARISON: None.    TECHNIQUE: CT chest without contrast. Coronal and sagittal reconstructions were obtained.  Radiation dose reduction techniques included automated exposure control or exposure modulation based on body size. Count of known CT and cardiac nuc med studies  performed in previous 12 months: 0.     FINDINGS:    Lungs: Bibasilar subsegmental atelectasis. No focal consolidation or mass. Small calcified pulmonary nodule in left upper lobe, likely pulmonary granuloma.    Pleura: No pleural effusions.    Cardiovascular: Normal heart size. No pericardial effusion. Normal course and caliber of the thoracic aorta.    Mediastinum: Evaluation is limited by lack of intravenous contrast. Small calcified hilar mediastinal lymph nodes.    Osseous: No acute osseous abnormality. Degenerative changes of the thoracic spine and dextroconvex curvature of the thoracic spine.    Other: None.    Upper abdomen: Small hiatal hernia.       Impression:        1.  No acute cardiopulmonary disease.   2.  Bibasilar subsegmental atelectasis.  3.  Small hiatal hernia.    Signer Name: Mark Bird MD   Signed: 11/10/2022 5:20 PM   Workstation Name: [id number]    [facility]    CT Abdomen Pelvis Without Contrast [[id number]] Collected: 11/10/22 1717     Updated: 11/10/22 1719    Narrative:      CT Abdomen Pelvis WO    INDICATION:   abd pain    TECHNIQUE:   CT of the abdomen and pelvis without IV contrast. Coronal and sagittal reconstructions were obtained.  Radiation dose reduction techniques included automated exposure control or exposure modulation based on body size. Count of known CT and cardiac nuc  med studies performed in previous 12 months: 0.     COMPARISON:   None available.    FINDINGS:  Evaluation of abdominal viscera is limited due to lack of intravenous contrast. Study is also degraded by patient body habitus.    Lower chest: Unremarkable.  Liver: Mildly nodular in  contour. No solid mass.  Gallbladder and bile ducts: Surgically absent gallbladder. No biliary dilatation.  Spleen: Borderline enlarged with scattered punctate calcifications, likely sequelae of old granulomatous disease.  Pancreas: Diffusely atrophic.  Adrenals: Unremarkable.  Kidneys and ureters: No renal or ureteral stones. No hydronephrosis.  Bowel: Postsurgical changes of the stomach. Nondilated bowel with no wall thickening.Appendix not visualized, but no inflammatory changes present in the right lower quadrant.  Bladder: Underdistended.  Reproductive organs: Normal uterus. No adnexal masses.  Lymph nodes: Unremarkable.  Peritoneum: Unremarkable.  Vessels: Prominent portosystemic collateral vessels.  Abdominal wall: Unremarkable.  Bones: Multilevel degenerative changes of the lumbar spine. Levoconvex curvature of the lumbar spine..      Impression:        1.  Exam is limited by lack of intravenous contrast and patient body habitus.  2.  No acute intra-abdominal abnormality.  3.  Mildly nodular contour of the liver which may reflect early cirrhosis. Recommend correlation with liver function tests.  4.  Borderline splenomegaly.    Signer Name: Mark Bird MD   Signed: 11/10/2022 5:17 PM   Workstation Name: [id number]    [facility]    XR Chest 1 View [[id number]] Collected: 11/10/22 1615     Updated: 11/10/22 1617    Narrative:      EXAM:    XR Chest, 1 View     EXAM DATE:    11/10/2022 3:13 PM     CLINICAL HISTORY:    cough     TECHNIQUE:    Frontal view of the chest.     COMPARISON:    No relevant prior studies available.     FINDINGS:    Lungs:  Pulmonary vascular congestion.  Right basilar airspace  disease.    Pleural space:  Unremarkable.  No pneumothorax.    Heart:  Marked cardiomegaly.    Mediastinum:  Unremarkable.    Bones/joints:  Unremarkable.       Impression:      1.  Cardiomegaly and pulmonary vascular congestion.  2.  Right basilar airspace disease.     This report was  finalized on 11/10/2022 4:15 PM by Dr. Carlos Manuel King MD.               Assessment:   #1 right lower extremity cellulitis  #2 possible right knee chronic osteomyelitis          Plan:   Dr. Oneal recommends continuing IV antibiotics per infectious disease service recommendations.  Continue to trend CRP.  No surgical treatment is recommended at this time.  Orthopedic surgery will continue to follow.  Will continue to trend laboratory studies.        Jeremiah Hooker, APRN November 12, 2022 09:55 EST

## 2022-11-12 NOTE — PLAN OF CARE
Problem: Skin Injury Risk Increased  Goal: Skin Health and Integrity  11/12/2022 1529 by Fiordaliza Pedraza RN  Outcome: Ongoing, Progressing  11/12/2022 1529 by Fiordaliza Pedraza RN  Outcome: Ongoing, Progressing  Intervention: Promote and Optimize Oral Intake  Recent Flowsheet Documentation  Taken 11/12/2022 0818 by Fiordaliza Pedraza RN  Oral Nutrition Promotion: rest periods promoted  Intervention: Optimize Skin Protection  Recent Flowsheet Documentation  Taken 11/12/2022 1400 by Fiordaliza Pedraza RN  Pressure Reduction Techniques: weight shift assistance provided  Pressure Reduction Devices: pressure-redistributing mattress utilized  Skin Protection: adhesive use limited  Taken 11/12/2022 0818 by Fiordaliza Pedraza RN  Pressure Reduction Techniques: weight shift assistance provided  Pressure Reduction Devices: pressure-redistributing mattress utilized  Skin Protection: adhesive use limited     Problem: Asthma Comorbidity  Goal: Maintenance of Asthma Control  11/12/2022 1529 by Fiordaliza Pedraza RN  Outcome: Ongoing, Progressing  11/12/2022 1529 by Fiordaliza Pedraza RN  Outcome: Ongoing, Progressing  Intervention: Maintain Asthma Symptom Control  Recent Flowsheet Documentation  Taken 11/12/2022 1500 by Fiordaliza Pedraza RN  Medication Review/Management: medications reviewed  Taken 11/12/2022 1400 by Fiordaliza Pedraza RN  Medication Review/Management: medications reviewed  Taken 11/12/2022 1300 by Fiordaliza Pedraza RN  Medication Review/Management: medications reviewed  Taken 11/12/2022 1100 by Fiordaliza Pedraza RN  Medication Review/Management: medications reviewed  Taken 11/12/2022 0900 by Fiordaliza Pedraza RN  Medication Review/Management: medications reviewed  Taken 11/12/2022 0818 by Fiordaliza Pedraza RN  Medication Review/Management: medications reviewed  Taken 11/12/2022 0700 by Fiordaliza Pedraza RN  Medication Review/Management: medications reviewed     Problem: Heart Failure Comorbidity  Goal: Maintenance of Heart Failure Symptom  Control  11/12/2022 1529 by Fiordaliza Pedraza RN  Outcome: Ongoing, Progressing  11/12/2022 1529 by Fiordaliza Pedraza RN  Outcome: Ongoing, Progressing  Intervention: Maintain Heart Failure-Management  Recent Flowsheet Documentation  Taken 11/12/2022 1500 by Fiordaliza Pedraza RN  Medication Review/Management: medications reviewed  Taken 11/12/2022 1400 by Fiordaliza Pedraza RN  Medication Review/Management: medications reviewed  Taken 11/12/2022 1300 by Fiordaliza Pedraza RN  Medication Review/Management: medications reviewed  Taken 11/12/2022 1100 by Fiordaliza Pedraza RN  Medication Review/Management: medications reviewed  Taken 11/12/2022 0900 by Fiordaliza Pedraza RN  Medication Review/Management: medications reviewed  Taken 11/12/2022 0818 by Fiordaliza Pedraza RN  Medication Review/Management: medications reviewed  Taken 11/12/2022 0700 by Fiordaliza Pedraza RN  Medication Review/Management: medications reviewed     Problem: Hypertension Comorbidity  Goal: Blood Pressure in Desired Range  11/12/2022 1529 by Fiordaliza Pedraza RN  Outcome: Ongoing, Progressing  11/12/2022 1529 by Fiordaliza Pedraza RN  Outcome: Ongoing, Progressing  Intervention: Maintain Blood Pressure Management  Recent Flowsheet Documentation  Taken 11/12/2022 1500 by Fiordaliza Pedraza RN  Medication Review/Management: medications reviewed  Taken 11/12/2022 1400 by Fiordaliza Pedraza RN  Medication Review/Management: medications reviewed  Taken 11/12/2022 1300 by Fiordaliza Pedraza RN  Medication Review/Management: medications reviewed  Taken 11/12/2022 1100 by Fiordaliza Pedraza RN  Medication Review/Management: medications reviewed  Taken 11/12/2022 0900 by Fiordaliza Pedraza RN  Medication Review/Management: medications reviewed  Taken 11/12/2022 0818 by Fiordaliza Pedraza RN  Medication Review/Management: medications reviewed  Taken 11/12/2022 0700 by Fiordaliza Pedraza RN  Medication Review/Management: medications reviewed     Problem: Obstructive Sleep Apnea Risk or Actual Comorbidity Management  Goal:  Unobstructed Breathing During Sleep  11/12/2022 1529 by Fiordaliza Pedraza RN  Outcome: Ongoing, Progressing  11/12/2022 1529 by Fiordaliza Pedraza RN  Outcome: Ongoing, Progressing     Problem: Osteoarthritis Comorbidity  Goal: Maintenance of Osteoarthritis Symptom Control  11/12/2022 1529 by Fiordaliza Pedraza RN  Outcome: Ongoing, Progressing  11/12/2022 1529 by Fiordaliza Pedraza RN  Outcome: Ongoing, Progressing  Intervention: Maintain Osteoarthritis Symptom Control  Recent Flowsheet Documentation  Taken 11/12/2022 1500 by Fiordaliza Pedraza RN  Medication Review/Management: medications reviewed  Taken 11/12/2022 1400 by Fiordaliza Pedraza RN  Activity Management: activity adjusted per tolerance  Medication Review/Management: medications reviewed  Taken 11/12/2022 1300 by Fiordaliza Pedraza RN  Medication Review/Management: medications reviewed  Taken 11/12/2022 1100 by Fiordaliza Pedraza RN  Medication Review/Management: medications reviewed  Taken 11/12/2022 0900 by Fiordaliza Pedraza RN  Medication Review/Management: medications reviewed  Taken 11/12/2022 0818 by Fiordaliza Pedraza RN  Activity Management: activity adjusted per tolerance  Medication Review/Management: medications reviewed  Taken 11/12/2022 0700 by Fiordaliza Pedraza RN  Medication Review/Management: medications reviewed     Problem: Pain Chronic (Persistent) (Comorbidity Management)  Goal: Acceptable Pain Control and Functional Ability  11/12/2022 1529 by Fiordaliza Pedraza RN  Outcome: Ongoing, Progressing  11/12/2022 1529 by Fiordaliza Pedraza RN  Outcome: Ongoing, Progressing  Intervention: Manage Persistent Pain  Recent Flowsheet Documentation  Taken 11/12/2022 1500 by Fiordaliza Pedraza RN  Medication Review/Management: medications reviewed  Taken 11/12/2022 1400 by Fiordaliza Pedraza RN  Medication Review/Management: medications reviewed  Taken 11/12/2022 1300 by Fiordaliza Pedraza RN  Medication Review/Management: medications reviewed  Taken 11/12/2022 1100 by Fiordaliza Pedraza RN  Medication  Review/Management: medications reviewed  Taken 11/12/2022 0900 by Fiordaliza Pedraza RN  Medication Review/Management: medications reviewed  Taken 11/12/2022 0818 by Fiordaliza Pedraza RN  Medication Review/Management: medications reviewed  Taken 11/12/2022 0700 by Fiordaliza Pedraza RN  Medication Review/Management: medications reviewed  Intervention: Develop Pain Management Plan  Recent Flowsheet Documentation  Taken 11/12/2022 0818 by Fiordaliza Pedraza RN  Pain Management Interventions: care clustered  Intervention: Optimize Psychosocial Wellbeing  Recent Flowsheet Documentation  Taken 11/12/2022 1400 by Fiordaliza Pedraza RN  Supportive Measures: active listening utilized  Diversional Activities:   television   smartphone  Family/Support System Care: self-care encouraged  Taken 11/12/2022 0818 by Fiordaliza Pedraza RN  Diversional Activities:   Instabe[employer]   smartphone  Family/Support System Care: self-care encouraged     Problem: Fall Injury Risk  Goal: Absence of Fall and Fall-Related Injury  11/12/2022 1529 by Fiordaliza Pedraza RN  Outcome: Ongoing, Progressing  11/12/2022 1529 by Fiordaliza Pedraza RN  Outcome: Ongoing, Progressing  Intervention: Identify and Manage Contributors  Recent Flowsheet Documentation  Taken 11/12/2022 1500 by Fiordaliza Pedraza RN  Medication Review/Management: medications reviewed  Taken 11/12/2022 1400 by Fiordaliza Pedraza RN  Medication Review/Management: medications reviewed  Self-Care Promotion:   BADL personal objects within reach   BADL personal routines maintained   safe use of adaptive equipment encouraged  Taken 11/12/2022 1300 by Fiordaliza Pedraza RN  Medication Review/Management: medications reviewed  Taken 11/12/2022 1100 by Fiordaliza Pedraza RN  Medication Review/Management: medications reviewed  Taken 11/12/2022 0900 by Fiordaliza Pedraza RN  Medication Review/Management: medications reviewed  Taken 11/12/2022 0818 by Fiordaliza Pedraza RN  Medication Review/Management: medications reviewed  Self-Care Promotion:    BADL personal objects within reach   BADL personal routines maintained  Taken 11/12/2022 0700 by Fiordaliza Pedraza RN  Medication Review/Management: medications reviewed  Intervention: Promote Injury-Free Environment  Recent Flowsheet Documentation  Taken 11/12/2022 1500 by Fiordaliza Pedraza RN  Safety Promotion/Fall Prevention: safety round/check completed  Taken 11/12/2022 1400 by Fiordaliza Pedraza RN  Safety Promotion/Fall Prevention: safety round/check completed  Taken 11/12/2022 1300 by Fiordaliza Pedraza RN  Safety Promotion/Fall Prevention: safety round/check completed  Taken 11/12/2022 1100 by Fiordaliza Pedraza RN  Safety Promotion/Fall Prevention: safety round/check completed  Taken 11/12/2022 0900 by Fiordaliza Pedraza RN  Safety Promotion/Fall Prevention: safety round/check completed  Taken 11/12/2022 0818 by Fiordaliza Pedraza RN  Safety Promotion/Fall Prevention: safety round/check completed  Taken 11/12/2022 0700 by Fiordaliza Pedraza RN  Safety Promotion/Fall Prevention: safety round/check completed     Problem: Adult Inpatient Plan of Care  Goal: Plan of Care Review  Outcome: Ongoing, Progressing  Flowsheets (Taken 11/12/2022 1529)  Progress: no change  Plan of Care Reviewed With: patient  Outcome Evaluation: Patient resting in bed. VSS. Room air. Pain medications given per order. No needs noted at this time. Will monitor 7a-7p.  Goal: Patient-Specific Goal (Individualized)  Outcome: Ongoing, Progressing  Goal: Absence of Hospital-Acquired Illness or Injury  Outcome: Ongoing, Progressing  Intervention: Identify and Manage Fall Risk  Recent Flowsheet Documentation  Taken 11/12/2022 1500 by Fiordaliza Pedraza RN  Safety Promotion/Fall Prevention: safety round/check completed  Taken 11/12/2022 1400 by Fiordaliza Pedraza RN  Safety Promotion/Fall Prevention: safety round/check completed  Taken 11/12/2022 1300 by Fiordaliza Pedraza RN  Safety Promotion/Fall Prevention: safety round/check completed  Taken 11/12/2022 1100 by Fiordaliza Pedraza  RN  Safety Promotion/Fall Prevention: safety round/check completed  Taken 11/12/2022 0900 by Fiordaliza Pedraza RN  Safety Promotion/Fall Prevention: safety round/check completed  Taken 11/12/2022 0818 by Fiordaliza Pedraza RN  Safety Promotion/Fall Prevention: safety round/check completed  Taken 11/12/2022 0700 by Fiordaliza Pedraza RN  Safety Promotion/Fall Prevention: safety round/check completed  Intervention: Prevent Skin Injury  Recent Flowsheet Documentation  Taken 11/12/2022 1400 by Fiordaliza Pedraza RN  Skin Protection: adhesive use limited  Taken 11/12/2022 0818 by Fiordaliza Pedraza RN  Skin Protection: adhesive use limited  Intervention: Prevent and Manage VTE (Venous Thromboembolism) Risk  Recent Flowsheet Documentation  Taken 11/12/2022 1400 by Fiordaliza Pedraza RN  Activity Management: activity adjusted per tolerance  Taken 11/12/2022 0818 by Fiordaliza Pedraza RN  Activity Management: activity adjusted per tolerance  VTE Prevention/Management: (Heparin) other (see comments)  Goal: Optimal Comfort and Wellbeing  Outcome: Ongoing, Progressing  Intervention: Monitor Pain and Promote Comfort  Recent Flowsheet Documentation  Taken 11/12/2022 0818 by Fiordaliza Pedraza RN  Pain Management Interventions: care clustered  Intervention: Provide Person-Centered Care  Recent Flowsheet Documentation  Taken 11/12/2022 1400 by Fiordaliza Pedraza RN  Trust Relationship/Rapport:   care explained   choices provided  Taken 11/12/2022 0818 by Fiordaliza Pedraza RN  Trust Relationship/Rapport:   care explained   choices provided  Goal: Readiness for Transition of Care  Outcome: Ongoing, Progressing   Goal Outcome Evaluation:  Plan of Care Reviewed With: patient        Progress: no change  Outcome Evaluation: Patient resting in bed. VSS. Room air. Pain medications given per order. No needs noted at this time. Will monitor 7a-7p.

## 2022-11-13 LAB
ANION GAP SERPL CALCULATED.3IONS-SCNC: 12 MMOL/L (ref 5–15)
BASOPHILS # BLD MANUAL: 0.25 10*3/MM3 (ref 0–0.2)
BASOPHILS NFR BLD MANUAL: 2 % (ref 0–1.5)
BUN SERPL-MCNC: 57 MG/DL (ref 6–20)
BUN/CREAT SERPL: 17.6 (ref 7–25)
CALCIUM SPEC-SCNC: 8.2 MG/DL (ref 8.6–10.5)
CHLORIDE SERPL-SCNC: 96 MMOL/L (ref 98–107)
CO2 SERPL-SCNC: 25 MMOL/L (ref 22–29)
CREAT SERPL-MCNC: 3.23 MG/DL (ref 0.57–1)
DACRYOCYTES BLD QL SMEAR: ABNORMAL
DEPRECATED RDW RBC AUTO: 51.6 FL (ref 37–54)
EGFRCR SERPLBLD CKD-EPI 2021: 16.1 ML/MIN/1.73
ERYTHROCYTE [DISTWIDTH] IN BLOOD BY AUTOMATED COUNT: 14.6 % (ref 12.3–15.4)
GLUCOSE SERPL-MCNC: 107 MG/DL (ref 65–99)
HCT VFR BLD AUTO: 28 % (ref 34–46.6)
HGB BLD-MCNC: 9.1 G/DL (ref 12–15.9)
HYPOCHROMIA BLD QL: ABNORMAL
LYMPHOCYTES # BLD MANUAL: 1.9 10*3/MM3 (ref 0.7–3.1)
LYMPHOCYTES NFR BLD MANUAL: 10 % (ref 5–12)
MACROCYTES BLD QL SMEAR: ABNORMAL
MCH RBC QN AUTO: 31.5 PG (ref 26.6–33)
MCHC RBC AUTO-ENTMCNC: 32.5 G/DL (ref 31.5–35.7)
MCV RBC AUTO: 96.9 FL (ref 79–97)
METAMYELOCYTES NFR BLD MANUAL: 3 % (ref 0–0)
MONOCYTES # BLD: 1.27 10*3/MM3 (ref 0.1–0.9)
NEUTROPHILS # BLD AUTO: 8.86 10*3/MM3 (ref 1.7–7)
NEUTROPHILS NFR BLD MANUAL: 67 % (ref 42.7–76)
NEUTS BAND NFR BLD MANUAL: 3 % (ref 0–5)
NEUTS VAC BLD QL SMEAR: ABNORMAL
PLATELET # BLD AUTO: 45 10*3/MM3 (ref 140–450)
PMV BLD AUTO: 9.8 FL (ref 6–12)
POTASSIUM SERPL-SCNC: 4 MMOL/L (ref 3.5–5.2)
RBC # BLD AUTO: 2.89 10*6/MM3 (ref 3.77–5.28)
SCAN SLIDE: NORMAL
SMALL PLATELETS BLD QL SMEAR: ABNORMAL
SODIUM SERPL-SCNC: 133 MMOL/L (ref 136–145)
TOXIC GRANULATION: ABNORMAL
VANCOMYCIN TROUGH SERPL-MCNC: 16.4 MCG/ML (ref 5–20)
VARIANT LYMPHS NFR BLD MANUAL: 15 % (ref 19.6–45.3)
WBC NRBC COR # BLD: 12.66 10*3/MM3 (ref 3.4–10.8)

## 2022-11-13 PROCEDURE — 80048 BASIC METABOLIC PNL TOTAL CA: CPT | Performed by: STUDENT IN AN ORGANIZED HEALTH CARE EDUCATION/TRAINING PROGRAM

## 2022-11-13 PROCEDURE — 99231 SBSQ HOSP IP/OBS SF/LOW 25: CPT | Performed by: STUDENT IN AN ORGANIZED HEALTH CARE EDUCATION/TRAINING PROGRAM

## 2022-11-13 PROCEDURE — 99232 SBSQ HOSP IP/OBS MODERATE 35: CPT | Performed by: NURSE PRACTITIONER

## 2022-11-13 PROCEDURE — 80202 ASSAY OF VANCOMYCIN: CPT | Performed by: STUDENT IN AN ORGANIZED HEALTH CARE EDUCATION/TRAINING PROGRAM

## 2022-11-13 PROCEDURE — 25010000002 MORPHINE PER 10 MG: Performed by: HOSPITALIST

## 2022-11-13 PROCEDURE — 25010000002 HEPARIN (PORCINE) PER 1000 UNITS: Performed by: HOSPITALIST

## 2022-11-13 PROCEDURE — 85007 BL SMEAR W/DIFF WBC COUNT: CPT | Performed by: STUDENT IN AN ORGANIZED HEALTH CARE EDUCATION/TRAINING PROGRAM

## 2022-11-13 PROCEDURE — 25010000002 VANCOMYCIN 1 G RECONSTITUTED SOLUTION 1 EACH VIAL: Performed by: NURSE PRACTITIONER

## 2022-11-13 PROCEDURE — 85025 COMPLETE CBC W/AUTO DIFF WBC: CPT | Performed by: STUDENT IN AN ORGANIZED HEALTH CARE EDUCATION/TRAINING PROGRAM

## 2022-11-13 RX ADMIN — MORPHINE SULFATE 2 MG: 2 INJECTION, SOLUTION INTRAMUSCULAR; INTRAVENOUS at 13:24

## 2022-11-13 RX ADMIN — HEPARIN SODIUM 5000 UNITS: 5000 INJECTION INTRAVENOUS; SUBCUTANEOUS at 08:41

## 2022-11-13 RX ADMIN — METOPROLOL TARTRATE 12.5 MG: 25 TABLET, FILM COATED ORAL at 08:42

## 2022-11-13 RX ADMIN — NYSTATIN 1 APPLICATION: 100000 CREAM TOPICAL at 20:38

## 2022-11-13 RX ADMIN — PANTOPRAZOLE SODIUM 40 MG: 40 TABLET, DELAYED RELEASE ORAL at 08:40

## 2022-11-13 RX ADMIN — Medication 10 ML: at 20:39

## 2022-11-13 RX ADMIN — Medication 10 ML: at 08:41

## 2022-11-13 RX ADMIN — SODIUM CHLORIDE, POTASSIUM CHLORIDE, SODIUM LACTATE AND CALCIUM CHLORIDE 50 ML/HR: 600; 310; 30; 20 INJECTION, SOLUTION INTRAVENOUS at 18:03

## 2022-11-13 RX ADMIN — OXYCODONE HYDROCHLORIDE 15 MG: 15 TABLET ORAL at 08:41

## 2022-11-13 RX ADMIN — FLUTICASONE PROPIONATE 2 SPRAY: 50 SPRAY, METERED NASAL at 08:41

## 2022-11-13 RX ADMIN — HEPARIN SODIUM 5000 UNITS: 5000 INJECTION INTRAVENOUS; SUBCUTANEOUS at 20:38

## 2022-11-13 RX ADMIN — NYSTATIN 1 APPLICATION: 100000 CREAM TOPICAL at 08:41

## 2022-11-13 RX ADMIN — ZINC OXIDE: 200 OINTMENT TOPICAL at 20:38

## 2022-11-13 RX ADMIN — VANCOMYCIN HYDROCHLORIDE 1000 MG: 1 INJECTION, POWDER, LYOPHILIZED, FOR SOLUTION INTRAVENOUS at 18:02

## 2022-11-13 RX ADMIN — ZINC OXIDE: 200 OINTMENT TOPICAL at 08:41

## 2022-11-13 RX ADMIN — MORPHINE SULFATE 2 MG: 2 INJECTION, SOLUTION INTRAMUSCULAR; INTRAVENOUS at 02:27

## 2022-11-13 RX ADMIN — OXYCODONE HYDROCHLORIDE 15 MG: 15 TABLET ORAL at 12:00

## 2022-11-13 RX ADMIN — SODIUM CHLORIDE, POTASSIUM CHLORIDE, SODIUM LACTATE AND CALCIUM CHLORIDE 50 ML/HR: 600; 310; 30; 20 INJECTION, SOLUTION INTRAVENOUS at 00:23

## 2022-11-13 RX ADMIN — OXYCODONE HYDROCHLORIDE 15 MG: 15 TABLET ORAL at 20:37

## 2022-11-13 RX ADMIN — LEVOTHYROXINE SODIUM 100 MCG: 100 TABLET ORAL at 08:41

## 2022-11-13 RX ADMIN — OXYCODONE HYDROCHLORIDE 15 MG: 15 TABLET ORAL at 18:02

## 2022-11-13 NOTE — PROGRESS NOTES
[facility] HOSPITALIST PROGRESS NOTE     Patient Identification:  Name:  Malina Velasquez  Age:  57 y.o.  Sex:  female  :  1965  MRN:  [id number]  Visit Number:  [id number]  ROOM: [address]     Primary Care Provider:  Jeremiah Topete MD    Length of stay in inpatient status:  3    Subjective     Chief Compliant:    Chief Complaint   Patient presents with   • Weakness - Generalized     Pt states dr sent her here for high wbc and kidney function        History of Presenting Illness:    Patient reports her right lower extremity pain is still present constantly, but is improving. Denies chest pain or shortness of breath. Reports continued cough. No acute events noted overnight.    Objective     Current Hospital Meds:fluticasone, 2 spray, Nasal, Daily  heparin (porcine), 5,000 Units, Subcutaneous, Q12H  levothyroxine, 100 mcg, Oral, Daily  metoprolol tartrate, 12.5 mg, Oral, Q12H  nystatin, 1 application, Topical, BID  oxyCODONE, 15 mg, Oral, 4x Daily  pantoprazole, 40 mg, Oral, QAM  sodium chloride, 10 mL, Intravenous, Q12H  sodium chloride, 10 mL, Intravenous, Q12H  vancomycin, 1,000 mg, Intravenous, Q24H  zinc oxide, , Topical, BID    lactated ringers, 50 mL/hr, Last Rate: 50 mL/hr (22 0023)        Current Antimicrobial Therapy:  Anti-Infectives (From admission, onward)    Ordered     Dose/Rate Route Frequency Start Stop    22 1628  vancomycin (VANCOCIN) 1,000 mg in sodium chloride 0.9 % 250 mL IVPB        Ordering Provider: Tameka Murguia APRN    1,000 mg  over 60 Minutes Intravenous Every 24 Hours 22 1800 23 1759    11/10/22 1938  meropenem (MERREM) 1 g in sodium chloride 0.9 % 100 mL IVPB-VTB        Ordering Provider: Miguel Licea MD    1 g  over 30 Minutes Intravenous Once 11/10/22 2000 11/10/22 2038    11/10/22 1539  aztreonam (AZACTAM) 2 g in sodium chloride 0.9 % 100 mL IVPB-VTB        Ordering Provider: Joyce Lindo PA    2 g  200 mL/hr over 30  Minutes Intravenous Once 11/10/22 1541 11/10/22 2009    11/10/22 1539  vancomycin 2500 mg/500 mL 0.9% NS IVPB ([facility])        Ordering Provider: Joyce Lindo PA    2,500 mg  over 120 Minutes Intravenous Once 11/10/22 1541 11/10/22 1950        Current Diuretic Therapy:  Diuretics (From admission, onward)    None        ----------------------------------------------------------------------------------------------------------------------  Vital Signs:  Temp:  [98.3 °F (36.8 °C)-98.9 °F (37.2 °C)] 98.3 °F (36.8 °C)  Heart Rate:  [] 78  Resp:  [12-20] 15  BP: ()/(40-76) 96/54  SpO2:  [89 %-100 %] 93 %  on   ;   Device (Oxygen Therapy): room air  Body mass index is 91.26 kg/m².    Wt Readings from Last 3 Encounters:   11/13/22 (!) 288 kg (636 lb)     Intake & Output (last 3 days)       11/10 0701  11/11 0700 11/11 0701  11/12 0700 11/12 0701  11/13 0700 11/13 0701 11/14 0700    P.O.  360 360 150    I.V. (mL/kg)   680 (2.4)     IV Piggyback 600       Total Intake(mL/kg) 600 (2.1) 360 (1.3) 1040 (3.6) 150 (0.5)    Urine (mL/kg/hr) 200 1600 (0.2) 500 (0.1) 400 (0.3)    Stool  0      Total Output 200 1600 500 400    Net +400 -1240 +540 -250            Stool Unmeasured Occurrence  1 x          Diet Regular; Cardiac, Renal  ----------------------------------------------------------------------------------------------------------------------  Physical exam:   Constitutional:  Well-developed, chronically ill appearing.  No acute distress.      HENT:  Head:  Normocephalic and atraumatic.    Cardiovascular:  Normal rate, regular rhythm and normal heart sounds with no murmur.  Pulmonary/Chest:  No respiratory distress, no wheezes, no crackles, with normal breath sounds and good air movement.  Abdominal:  Soft. No distension and no tenderness.   Musculoskeletal: Right lower extremity is tender to palpation from knee down. No deformity.    Neurological:  Awake, alert, no focal deficit on gross examination. No slurred  speech or facial droop.  Skin:  Skin is warm and dry. Erythema of right lower extremity is still mild, improved again from yesterday.  Peripheral vascular:  No cyanosis. Bilateral lymphedema noted, R>L. Extremities are warm and well perfused.  Psychiatric: Appropriate mood and affect  Edited by: Ann Marie Ochoa DO at 11/13/2022 1726    ----------------------------------------------------------------------------------------------------------------------  Results from last 7 days   Lab Units 11/13/22  0053 11/12/22  1015 11/12/22  0832 11/12/22  0433 11/11/22  1226 11/11/22  0648 11/11/22  0045 11/10/22  1800 11/10/22  1546 11/10/22  1433   CRP mg/dL  --  14.42*  --   --   --   --  17.21*  --   --  20.13*   LACTATE mmol/L  --   --  3.7*  --  3.9* 4.6* 5.0*   < >  --  5.0*   WBC 10*3/mm3 12.66*  --   --  12.64*  --  9.70 10.93*  --   --  13.35*   HEMOGLOBIN g/dL 9.1*  --   --  8.8*  --  8.7* 9.0*  --   --  9.5*   HEMATOCRIT % 28.0*  --   --  26.5*  --  27.3* 28.1*  --   --  29.4*   MCV fL 96.9  --   --  96.4  --  101.5* 99.3*  --   --  97.7*   MCHC g/dL 32.5  --   --  33.2  --  31.9 32.0  --   --  32.3   PLATELETS 10*3/mm3 45*  --   --  49*  --  43* 47*  --   --  56*   INR   --   --   --   --   --   --   --   --  1.12*  --     < > = values in this interval not displayed.         Results from last 7 days   Lab Units 11/13/22  0053 11/12/22  0433 11/11/22  0648 11/11/22  0045 11/10/22  1433   SODIUM mmol/L 133* 133* 130* 132* 133*   POTASSIUM mmol/L 4.0 4.4 5.6* 4.4 5.2   CHLORIDE mmol/L 96* 97* 96* 95* 94*   CO2 mmol/L 25.0 24.8 18.2* 21.0* 25.9   BUN mg/dL 57* 61* 69* 68* 67*   CREATININE mg/dL 3.23* 3.65* 4.30* 4.53* 4.41*   CALCIUM mg/dL 8.2* 8.3* 8.5* 8.8 9.1   GLUCOSE mg/dL 107* 94 112* 125* 114*   ALBUMIN g/dL  --   --  1.58* 2.11* 2.68*   BILIRUBIN mg/dL  --   --  0.7 0.7 0.8   ALK PHOS U/L  --   --  101 116 134*   AST (SGOT) U/L  --   --  34* 25 22   ALT (SGPT) U/L  --   --  10 11 9   Estimated Creatinine  Clearance: 47.3 mL/min (A) (by C-G formula based on SCr of 3.23 mg/dL (H)).  No results found for: AMMONIA  Results from last 7 days   Lab Units 11/10/22  1433   TROPONIN T ng/mL 0.016             Hemoglobin A1C   Date/Time Value Ref Range Status   11/10/2022 1433 4.70 (L) 4.80 - 5.60 % Final     Lab Results   Component Value Date    TSH 0.960 11/10/2022     No results found for: PREGTESTUR, PREGSERUM, HCG, HCGQUANT  Pain Management Panel     Pain Management Panel Latest Ref Rng & Units 11/11/2022    AMPHETAMINES SCREEN, URINE Negative Negative    BARBITURATES SCREEN Negative Negative    BENZODIAZEPINE SCREEN, URINE Negative Negative    BUPRENORPHINEUR Negative Negative    COCAINE SCREEN, URINE Negative Negative    METHADONE SCREEN, URINE Negative Negative    METHAMPHETAMINEUR Negative Negative        Brief Urine Lab Results  (Last result in the past 365 days)      Color   Clarity   Blood   Leuk Est   Nitrite   Protein   CREAT   Urine HCG        11/11/22 1047 Yellow   Clear   Negative   Negative   Negative   Negative               Blood Culture   Date Value Ref Range Status   11/10/2022 No growth at 2 days  Preliminary   11/10/2022 No growth at 2 days  Preliminary     No results found for: URINECX  No results found for: WOUNDCX  No results found for: STOOLCX  No results found for: RESPCX  No results found for: AFBCX  Results from last 7 days   Lab Units 11/12/22  1015 11/12/22  0832 11/11/22  1226 11/11/22  0648 11/11/22  0045 11/10/22  2209 11/10/22  1800 11/10/22  1433   PROCALCITONIN ng/mL  --   --   --   --  0.87*  --   --  1.13*   LACTATE mmol/L  --  3.7* 3.9* 4.6* 5.0* 5.1* 4.2* 5.0*   CRP mg/dL 14.42*  --   --   --  17.21*  --   --  20.13*       I have personally looked at the labs and they are summarized above.  ----------------------------------------------------------------------------------------------------------------------  Detailed radiology reports for the last 24 hours:  Imaging Results (Last 24  Hours)     ** No results found for the last 24 hours. **        Assessment & Plan    #Severe sepsis criteria met likely secondary to cellulitis of right lower extremity, present on admission   #Cellulitis of right foot and possible recurrent versus persistent septic arthritis of right knee joint , present on admission   #History of MRSA infection of right knee joint s/p washout  #History of pyogenic arthritis of right knee joint 07/2022  #Reported recent nausea, vomiting, and diarrhea  -Met severe sepsis criteria with HR >90, C-RP 20.13, WBC 13.35, lactate 5.0, and procalcitonin 1.13.   -Urinalysis with no evidence of acute UTI. No evidence of pneumonia on chest x-ray. No acute intra-abdominal findings on CT abdomen/pelvis.  - Continue vancomycin monotherapy per ID recommendations  - Appreciate ID and Orthopedic Surgery recommendations  - GI PCR negative  - Follow up blood cultures, currently no growth at 3 days     #Acute on chronic kidney disease, present on admission   #Anemia and thrombocytopenia  - Creatinine 4.41 at admission, per ED report creatinine was previously 1.34  - Nephrology consulted, appreciate assistance  - Creatinine is slowly improving, down to 3.23 today.   -Per Nephrology HUS is a possible concern with her anemia/thrombocytopenia--peripheral smear ordered. Per Nephrology's note there was a verbal report of no schistocytes seen. LDH mildly elevated, haptoglobin within normal limits. May also be secondary to cirrhosis vs SUZETTE. Repeat CBC in AM.   - Per discussion with patient she has been told in the past she has cirrhosis, and I think this is high on the differential diagnosis for her anemia/thrombocytopenia.  - Consider hematology referral when they return tomorrow, at Nephrology's recommendation     #Reported history of CHF  - Echo ordered, showed grade I diastolic dysfunction, mild LV concentric hypertrophy, mild aortic valve stenosis    #Essential hypertension  - Monitor BP per protocol  -  Continue home metoprolol    #Liver cirrhosis  #Chronic hepatitis C  #Hypoalbuminemia  - Liver enzymes normal at admission. Would likely benefit from referral back to GI at discharge (patient reports she saw a GI doctor years ago in [place]).    #Hypothyroidism  - TSH normal at admission. Continue home levothyroxine.  Edited by: Ann Marie Ochoa DO at 11/13/2022 7359    VTE Prophylaxis:   Mechanical Order History:     None      Pharmalogical Order History:      Ordered     Dose Route Frequency Stop    11/10/22 2214  heparin (porcine) 5000 UNIT/ML injection 5,000 Units         5,000 Units SC Every 12 Hours Scheduled --                Dispo: pending clinical course    Ann Marie Ochoa DO  [facility]  11/13/22  11:46 EST

## 2022-11-13 NOTE — PROGRESS NOTES
Nephrology Progress Note      Subjective     Pt feels much better, no chest pain or shortness of breath.     Objective       Vital signs :     Temp:  [98.3 °F (36.8 °C)-98.9 °F (37.2 °C)] 98.3 °F (36.8 °C)  Heart Rate:  [] 98  Resp:  [12-20] 20  BP: ()/(40-74) 107/58    Intake/Output                       11/11/22 0701 - 11/12/22 0700 11/12/22 0701 - 11/13/22 0700     [id number] 0383-4344 Total 2927-4744 3932-9329 Total                 Intake    P.O.  360  -- 360  360  -- 360    I.V.  --  -- --  680  -- 680    Total Intake 360 -- 360 1040 -- 1040       Output    Urine  700  900 1600  200  300 500    Total Output  200 300 500           Physical Exam:    General Appearance : not in acute distress  Lungs : clear to auscultation, respirations regular  Heart :  regular rhythm & normal rate, normal S1, S2 and no murmur, no rub  Abdomen : soft, non distended  Extremities : 1+ edema  Neurologic :   orientated to person, place, time and situation, Grossly no focal deficits    Laboratory Data :     Albumin Albumin   Date Value Ref Range Status   11/11/2022 1.58 (L) 3.50 - 5.20 g/dL Final   11/11/2022 2.11 (L) 3.50 - 5.20 g/dL Final   11/10/2022 2.68 (L) 3.50 - 5.20 g/dL Final      Magnesium No results found for: MG       PTH               No results found for: PTH    CBC and coagulation:  Results from last 7 days   Lab Units 11/13/22  0053 11/12/22  1015 11/12/22  0832 11/12/22  0433 11/11/22  1226 11/11/22  0648 11/11/22  0045 11/10/22  1800 11/10/22  1546 11/10/22  1433   PROCALCITONIN ng/mL  --   --   --   --   --   --  0.87*  --   --  1.13*   LACTATE mmol/L  --   --  3.7*  --  3.9* 4.6* 5.0*   < >  --  5.0*   CRP mg/dL  --  14.42*  --   --   --   --  17.21*  --   --  20.13*   WBC 10*3/mm3 12.66*  --   --  12.64*  --  9.70 10.93*  --   --  13.35*   HEMOGLOBIN g/dL 9.1*  --   --  8.8*  --  8.7* 9.0*  --   --  9.5*   HEMATOCRIT % 28.0*  --   --  26.5*  --  27.3* 28.1*  --   --  29.4*   MCV fL 96.9  --    --  96.4  --  101.5* 99.3*  --   --  97.7*   MCHC g/dL 32.5  --   --  33.2  --  31.9 32.0  --   --  32.3   PLATELETS 10*3/mm3 45*  --   --  49*  --  43* 47*  --   --  56*   INR   --   --   --   --   --   --   --   --  1.12*  --     < > = values in this interval not displayed.     Acid/base balance:      Renal and electrolytes:    Results from last 7 days   Lab Units 11/13/22  0053 11/12/22  0433 11/11/22  0648 11/11/22  0045 11/10/22  1433   SODIUM mmol/L 133* 133* 130* 132* 133*   POTASSIUM mmol/L 4.0 4.4 5.6* 4.4 5.2   CHLORIDE mmol/L 96* 97* 96* 95* 94*   CO2 mmol/L 25.0 24.8 18.2* 21.0* 25.9   BUN mg/dL 57* 61* 69* 68* 67*   CREATININE mg/dL 3.23* 3.65* 4.30* 4.53* 4.41*   CALCIUM mg/dL 8.2* 8.3* 8.5* 8.8 9.1     Estimated Creatinine Clearance: 47.3 mL/min (A) (by C-G formula based on SCr of 3.23 mg/dL (H)).  @GFRCG:3@   Liver and pancreatic function:  Results from last 7 days   Lab Units 11/11/22  0648 11/11/22  0045 11/10/22  1546 11/10/22  1433   ALBUMIN g/dL 1.58* 2.11*  --  2.68*   BILIRUBIN mg/dL 0.7 0.7  --  0.8   ALK PHOS U/L 101 116  --  134*   AST (SGOT) U/L 34* 25  --  22   ALT (SGPT) U/L 10 11  --  9   LIPASE U/L  --   --  28  --          Cardiac:      Liver and pancreatic function:  Results from last 7 days   Lab Units 11/11/22  0648 11/11/22  0045 11/10/22  1546 11/10/22  1433   ALBUMIN g/dL 1.58* 2.11*  --  2.68*   BILIRUBIN mg/dL 0.7 0.7  --  0.8   ALK PHOS U/L 101 116  --  134*   AST (SGOT) U/L 34* 25  --  22   ALT (SGPT) U/L 10 11  --  9   LIPASE U/L  --   --  28  --        Medications :     fluticasone, 2 spray, Nasal, Daily  heparin (porcine), 5,000 Units, Subcutaneous, Q12H  levothyroxine, 100 mcg, Oral, Daily  metoprolol tartrate, 12.5 mg, Oral, Q12H  nystatin, 1 application, Topical, BID  oxyCODONE, 15 mg, Oral, 4x Daily  pantoprazole, 40 mg, Oral, QAM  sodium chloride, 10 mL, Intravenous, Q12H  sodium chloride, 10 mL, Intravenous, Q12H  vancomycin, 1,000 mg, Intravenous, Q24H  zinc  oxide, , Topical, BID      lactated ringers, 50 mL/hr, Last Rate: 50 mL/hr (11/13/22 0023)          Assessment & Plan     1. SUZETTE, non ogliguric  2. Hyponatremia likely hypovolumic presumed chronic  3. Anion gap metabolic acidosis  4. Hyperkalemia  5. Anemia and thrombocytopenia  6. History of hep C induced liver Cirrhosis    Renal functions continue impriving but relatively slowly, remains non oliguric.   Oral intake is improving, will continue on IVF for now.     SUZETTE likley due to multifactorial, including ATN from prolonged pre renal state with concomitant use of  NSAIDS, and ATN from sepsis.   Other etiology to consider is HUS, and given the history of anemia, thrombocytopenia and hyperkalemia, needs to r/o TMA. Although anemia and thrombocytopenia can be due to cirrhosis with splenomegaly.  Baseline Cr unknown, admitted with 4.5  No hematuria, no proteinuria on UA  No hydronephrosis on CT        Colin Chris MD  11/13/22  07:32 EST

## 2022-11-13 NOTE — PLAN OF CARE
Goal Outcome Evaluation:  Plan of Care Reviewed With: patient           Outcome Evaluation: VSS on room air. Medicaitons given per pain per patient request, see MAR documentation. Otherwise no changes at this time. Safety maintained, call light in reach.

## 2022-11-13 NOTE — PLAN OF CARE
Problem: Skin Injury Risk Increased  Goal: Skin Health and Integrity  Outcome: Ongoing, Progressing  Intervention: Optimize Skin Protection  Recent Flowsheet Documentation  Taken 11/13/2022 1400 by Fiordaliza Pedraza RN  Pressure Reduction Techniques: weight shift assistance provided  Pressure Reduction Devices: pressure-redistributing mattress utilized  Taken 11/13/2022 0800 by Fiordaliza Pedraza RN  Pressure Reduction Techniques: weight shift assistance provided  Pressure Reduction Devices: pressure-redistributing mattress utilized  Skin Protection: adhesive use limited     Problem: Asthma Comorbidity  Goal: Maintenance of Asthma Control  Outcome: Ongoing, Progressing  Intervention: Maintain Asthma Symptom Control  Recent Flowsheet Documentation  Taken 11/13/2022 1700 by Fiordaliza Pedraza RN  Medication Review/Management: medications reviewed  Taken 11/13/2022 1500 by Fiordaliza Pedraza RN  Medication Review/Management: medications reviewed  Taken 11/13/2022 1400 by Fiordaliza Pedraza RN  Medication Review/Management: medications reviewed  Taken 11/13/2022 1300 by Fiordaliza Pedraza RN  Medication Review/Management: medications reviewed  Taken 11/13/2022 1100 by Fiordaliza Pedraza RN  Medication Review/Management: medications reviewed  Taken 11/13/2022 0900 by Fiordaliza Pedraza RN  Medication Review/Management: medications reviewed  Taken 11/13/2022 0800 by Fiordaliza Pedraza RN  Medication Review/Management: medications reviewed  Taken 11/13/2022 0700 by Fiordaliza Pedraza RN  Medication Review/Management: medications reviewed     Problem: Heart Failure Comorbidity  Goal: Maintenance of Heart Failure Symptom Control  Outcome: Ongoing, Progressing  Intervention: Maintain Heart Failure-Management  Recent Flowsheet Documentation  Taken 11/13/2022 1700 by Fiordaliza Pedraza RN  Medication Review/Management: medications reviewed  Taken 11/13/2022 1500 by Fiordaliza Pedraza RN  Medication Review/Management: medications reviewed  Taken 11/13/2022 1400 by Milo  Fiordaliza PANDA RN  Medication Review/Management: medications reviewed  Taken 11/13/2022 1300 by Fiordaliza Pedraza RN  Medication Review/Management: medications reviewed  Taken 11/13/2022 1100 by Fiordaliza Pedraza RN  Medication Review/Management: medications reviewed  Taken 11/13/2022 0900 by Fiordaliza Pedraza RN  Medication Review/Management: medications reviewed  Taken 11/13/2022 0800 by Fiordaliza Pedraza RN  Medication Review/Management: medications reviewed  Taken 11/13/2022 0700 by Fiordaliza Pedraza RN  Medication Review/Management: medications reviewed     Problem: Hypertension Comorbidity  Goal: Blood Pressure in Desired Range  Outcome: Ongoing, Progressing  Intervention: Maintain Blood Pressure Management  Recent Flowsheet Documentation  Taken 11/13/2022 1700 by Fiordaliza Pedraza RN  Medication Review/Management: medications reviewed  Taken 11/13/2022 1500 by Fiordlaiza Pedraza RN  Medication Review/Management: medications reviewed  Taken 11/13/2022 1400 by Fiordaliza Pedraza RN  Medication Review/Management: medications reviewed  Taken 11/13/2022 1300 by Fiordaliza Pedraza RN  Medication Review/Management: medications reviewed  Taken 11/13/2022 1100 by Fiordaliza Pedraza RN  Medication Review/Management: medications reviewed  Taken 11/13/2022 0900 by Fiordaliza Pedraza RN  Medication Review/Management: medications reviewed  Taken 11/13/2022 0800 by Fiordaliza Pedraza RN  Medication Review/Management: medications reviewed  Taken 11/13/2022 0700 by Fiordaliza Pedraza RN  Medication Review/Management: medications reviewed     Problem: Obstructive Sleep Apnea Risk or Actual Comorbidity Management  Goal: Unobstructed Breathing During Sleep  Outcome: Ongoing, Progressing     Problem: Osteoarthritis Comorbidity  Goal: Maintenance of Osteoarthritis Symptom Control  Outcome: Ongoing, Progressing  Intervention: Maintain Osteoarthritis Symptom Control  Recent Flowsheet Documentation  Taken 11/13/2022 1700 by Fiordaliza Pedraza RN  Medication Review/Management: medications  reviewed  Taken 11/13/2022 1500 by Fiordaliza Pedraza RN  Medication Review/Management: medications reviewed  Taken 11/13/2022 1400 by Fiordaliza Pedraza RN  Activity Management: activity adjusted per tolerance  Medication Review/Management: medications reviewed  Taken 11/13/2022 1300 by Fiordaliza Pedraza RN  Medication Review/Management: medications reviewed  Taken 11/13/2022 1100 by Fiordaliza Pedraza RN  Medication Review/Management: medications reviewed  Taken 11/13/2022 0900 by Fiordaliza Pedraza RN  Medication Review/Management: medications reviewed  Taken 11/13/2022 0800 by Fiordaliza Pedraza RN  Medication Review/Management: medications reviewed  Taken 11/13/2022 0700 by Fiordaliza Pedraza RN  Medication Review/Management: medications reviewed     Problem: Pain Chronic (Persistent) (Comorbidity Management)  Goal: Acceptable Pain Control and Functional Ability  Outcome: Ongoing, Progressing  Intervention: Manage Persistent Pain  Recent Flowsheet Documentation  Taken 11/13/2022 1700 by Fiordaliza Pedraza RN  Medication Review/Management: medications reviewed  Taken 11/13/2022 1500 by Fiordaliza Pedraza RN  Medication Review/Management: medications reviewed  Taken 11/13/2022 1400 by Fiordaliza Pedraza RN  Medication Review/Management: medications reviewed  Taken 11/13/2022 1300 by Fiordaliza Pedraza RN  Medication Review/Management: medications reviewed  Taken 11/13/2022 1100 by Fiordaliza Pedraza RN  Medication Review/Management: medications reviewed  Taken 11/13/2022 0900 by Fiordaliza Perdaza RN  Medication Review/Management: medications reviewed  Taken 11/13/2022 0800 by Fiordaliza Pedraza RN  Medication Review/Management: medications reviewed  Taken 11/13/2022 0700 by Fiordaliza Pedraza RN  Medication Review/Management: medications reviewed  Intervention: Develop Pain Management Plan  Recent Flowsheet Documentation  Taken 11/13/2022 1400 by Fiordaliza Pedraza RN  Pain Management Interventions: see MAR  Intervention: Optimize Psychosocial Wellbeing  Recent Flowsheet  Documentation  Taken 11/13/2022 1400 by Fiordaliza Pedraza RN  Supportive Measures: active listening utilized  Diversional Activities:   smartphone   television  Family/Support System Care: self-care encouraged  Taken 11/13/2022 0800 by Fiordaliza Pedraza RN  Supportive Measures: active listening utilized  Diversional Activities:   smartphone   television  Family/Support System Care: self-care encouraged     Problem: Fall Injury Risk  Goal: Absence of Fall and Fall-Related Injury  Outcome: Ongoing, Progressing  Intervention: Identify and Manage Contributors  Recent Flowsheet Documentation  Taken 11/13/2022 1700 by Fiordaliza Pedraza RN  Medication Review/Management: medications reviewed  Taken 11/13/2022 1500 by Fiordaliza Pedraza RN  Medication Review/Management: medications reviewed  Taken 11/13/2022 1400 by Fiordaliza Pedraza RN  Medication Review/Management: medications reviewed  Self-Care Promotion:   BADL personal objects within reach   BADL personal routines maintained   safe use of adaptive equipment encouraged  Taken 11/13/2022 1300 by Fiordaliza Pedraza RN  Medication Review/Management: medications reviewed  Taken 11/13/2022 1100 by Fiordaliza Pedraza RN  Medication Review/Management: medications reviewed  Taken 11/13/2022 0900 by Fiordaliza Pedraza RN  Medication Review/Management: medications reviewed  Taken 11/13/2022 0800 by Fiordaliza Pedraza RN  Medication Review/Management: medications reviewed  Self-Care Promotion:   BADL personal objects within reach   BADL personal routines maintained  Taken 11/13/2022 0700 by Fiordaliza Pedraza RN  Medication Review/Management: medications reviewed  Intervention: Promote Injury-Free Environment  Recent Flowsheet Documentation  Taken 11/13/2022 1700 by Fiordaliza Pedraza RN  Safety Promotion/Fall Prevention: safety round/check completed  Taken 11/13/2022 1500 by Fiordaliza Pedraza RN  Safety Promotion/Fall Prevention: safety round/check completed  Taken 11/13/2022 1400 by Fiordaliza Pedraza RN  Safety  Promotion/Fall Prevention: safety round/check completed  Taken 11/13/2022 1300 by Fiordaliza Pedraza RN  Safety Promotion/Fall Prevention: safety round/check completed  Taken 11/13/2022 1100 by Fiordaliza Pedraza RN  Safety Promotion/Fall Prevention: safety round/check completed  Taken 11/13/2022 0900 by Fiordaliza Pedraza RN  Safety Promotion/Fall Prevention: safety round/check completed  Taken 11/13/2022 0700 by Fiordaliza Pedraza RN  Safety Promotion/Fall Prevention: safety round/check completed     Problem: Adult Inpatient Plan of Care  Goal: Plan of Care Review  Outcome: Ongoing, Progressing  Flowsheets (Taken 11/13/2022 1736)  Progress: improving  Outcome Evaluation: VSS. Room air. A&Ox4. Pain medications given per order. No needs at this time. Will monitor 7a-7p.  Goal: Patient-Specific Goal (Individualized)  Outcome: Ongoing, Progressing  Goal: Absence of Hospital-Acquired Illness or Injury  Outcome: Ongoing, Progressing  Intervention: Identify and Manage Fall Risk  Recent Flowsheet Documentation  Taken 11/13/2022 1700 by Fiordaliza Pedraza RN  Safety Promotion/Fall Prevention: safety round/check completed  Taken 11/13/2022 1500 by Fiordaliza Pedraza RN  Safety Promotion/Fall Prevention: safety round/check completed  Taken 11/13/2022 1400 by Fiordaliza Pedraza RN  Safety Promotion/Fall Prevention: safety round/check completed  Taken 11/13/2022 1300 by Fiordaliza Pedraza RN  Safety Promotion/Fall Prevention: safety round/check completed  Taken 11/13/2022 1100 by Fiordaliza Pedraza RN  Safety Promotion/Fall Prevention: safety round/check completed  Taken 11/13/2022 0900 by Fiordaliza Pedraza RN  Safety Promotion/Fall Prevention: safety round/check completed  Taken 11/13/2022 0700 by Fiordaliza Pedraza RN  Safety Promotion/Fall Prevention: safety round/check completed  Intervention: Prevent Skin Injury  Recent Flowsheet Documentation  Taken 11/13/2022 0800 by Fiordaliza Pedraza RN  Skin Protection: adhesive use limited  Intervention: Prevent and Manage VTE  (Venous Thromboembolism) Risk  Recent Flowsheet Documentation  Taken 11/13/2022 1400 by Fiordaliza Pedraza RN  Activity Management: activity adjusted per tolerance  Taken 11/13/2022 0800 by Fiordaliza Pedraza RN  VTE Prevention/Management: (heparin) other (see comments)  Intervention: Prevent Infection  Recent Flowsheet Documentation  Taken 11/13/2022 1700 by Fiordaliza Pedraza RN  Infection Prevention: rest/sleep promoted  Taken 11/13/2022 1500 by Fiordaliza Pedraza RN  Infection Prevention: rest/sleep promoted  Taken 11/13/2022 1400 by Fiordaliza Pedraza RN  Infection Prevention: rest/sleep promoted  Taken 11/13/2022 1300 by Fiordaliza Pedraza RN  Infection Prevention: rest/sleep promoted  Taken 11/13/2022 1100 by Fiordaliza Pedraza RN  Infection Prevention: rest/sleep promoted  Taken 11/13/2022 0900 by Fiordaliza Pedraza RN  Infection Prevention: rest/sleep promoted  Taken 11/13/2022 0700 by Fiordaliza Pedraza RN  Infection Prevention: single patient room provided  Goal: Optimal Comfort and Wellbeing  Outcome: Ongoing, Progressing  Intervention: Monitor Pain and Promote Comfort  Recent Flowsheet Documentation  Taken 11/13/2022 1400 by Fiordaliza Pedraza RN  Pain Management Interventions: see MAR  Intervention: Provide Person-Centered Care  Recent Flowsheet Documentation  Taken 11/13/2022 1400 by Fiordaliza Pedraza RN  Trust Relationship/Rapport:   care explained   choices provided  Taken 11/13/2022 0800 by Fiordaliza Pedraza RN  Trust Relationship/Rapport:   care explained   choices provided  Goal: Readiness for Transition of Care  Outcome: Ongoing, Progressing   Goal Outcome Evaluation:  Plan of Care Reviewed With: patient        Progress: improving  Outcome Evaluation: VSS. Room air. A&Ox4. Pain medications given per order. No needs at this time. Will monitor 7a-7p.

## 2022-11-13 NOTE — PROGRESS NOTES
PROGRESS NOTE         Patient Identification:  Name:  Malina Velasquez  Age:  57 y.o.  Sex:  female  :  1965  MRN:  [id number]  Visit Number:  [id number]  Primary Care Provider:  Jeremiah Topete MD         LOS: 3 days       ----------------------------------------------------------------------------------------------------------------------  Subjective       Chief Complaints:    Weakness - Generalized (Pt states dr sent her here for high wbc and kidney function )        Interval History:      Patient resting comfortably in bed this morning.  Right lower extremity erythema improving.  WBC stable at 12.66.  CRP improving at 14.42.    Review of Systems:    Constitutional: no fever, chills and night sweats.  Generalized fatigue.  Eyes: no eye drainage, itching or redness.  HEENT: no mouth sores, dysphagia or nose bleed.  Respiratory: no for shortness of breath, cough or production of sputum.  Cardiovascular: no chest pain, no palpitations, no orthopnea.  Gastrointestinal: no nausea, vomiting or diarrhea. No abdominal pain, hematemesis or rectal bleeding.  Genitourinary: no dysuria or polyuria.  Hematologic/lymphatic: no lymph node abnormalities, no easy bruising or easy bleeding.  Musculoskeletal: Right lower extremity pain.  Skin: No rash and no itching.  Neurological: no loss of consciousness, no seizure, no headache.  Behavioral/Psych: no depression or suicidal ideation.  Endocrine: no hot flashes.  Immunologic: negative.    ----------------------------------------------------------------------------------------------------------------------      Objective       Current [facility] Meds:  fluticasone, 2 spray, Nasal, Daily  heparin (porcine), 5,000 Units, Subcutaneous, Q12H  levothyroxine, 100 mcg, Oral, Daily  metoprolol tartrate, 12.5 mg, Oral, Q12H  nystatin, 1 application, Topical, BID  oxyCODONE, 15 mg, Oral, 4x Daily  pantoprazole, 40 mg, Oral, QAM  sodium chloride, 10 mL, Intravenous,  Q12H  sodium chloride, 10 mL, Intravenous, Q12H  vancomycin, 1,000 mg, Intravenous, Q24H  zinc oxide, , Topical, BID      lactated ringers, 50 mL/hr, Last Rate: 50 mL/hr (11/13/22 0023)      ----------------------------------------------------------------------------------------------------------------------    Vital Signs:  Temp:  [98.3 °F (36.8 °C)-98.9 °F (37.2 °C)] 98.3 °F (36.8 °C)  Heart Rate:  [] 78  Resp:  [12-20] 15  BP: ()/(40-76) 96/54  Mean Arterial Pressure (Non-Invasive) for the past 24 hrs (Last 3 readings):   Noninvasive MAP (mmHg)   11/13/22 1100 67   11/13/22 1000 69   11/13/22 0900 79     SpO2 Percentage    11/13/22 0900 11/13/22 1000 11/13/22 1100   SpO2: 99% 91% 93%     SpO2:  [89 %-100 %] 93 %  on   ;   Device (Oxygen Therapy): room air    Body mass index is 91.26 kg/m².  Wt Readings from Last 3 Encounters:   11/13/22 (!) 288 kg (636 lb)        Intake/Output Summary (Last 24 hours) at 11/13/2022 1218  Last data filed at 11/13/2022 0800  Gross per 24 hour   Intake 1070 ml   Output 900 ml   Net 170 ml     Diet Regular; Cardiac, Renal  ----------------------------------------------------------------------------------------------------------------------      Physical Exam:    Constitutional:  Well-developed and well-nourished.  No respiratory distress.   Sitting up in bed.  On room air with no apparent distress.    HENT:  Head: Normocephalic and atraumatic.  Mouth:  Moist mucous membranes.    Eyes:  Conjunctivae and EOM are normal.  No scleral icterus.  Neck:  Neck supple.  No JVD present.    Cardiovascular:  Normal rate, regular rhythm and normal heart sounds with no murmur. No edema.  Pulmonary/Chest:  No respiratory distress, no wheezes, no crackles, with normal breath sounds and good air movement.  Abdominal:  Soft.  Bowel sounds are normal.  No distension and no tenderness.   Musculoskeletal: Bilateral lower extremity lymphedema.  Right lower extremity edema with mild erythema and  warmth, pain with palpation.  Neurological:  Alert and oriented to person, place, and time.  No facial droop.  No slurred speech.   Skin:  Skin is warm and dry.  No rash noted.  No pallor. Bilateral lower extremity lymphedema.  Right lower extremity edema with mild erythema and warmth, pain with palpation.  Psychiatric:  Normal mood and affect.  Behavior is normal.      ----------------------------------------------------------------------------------------------------------------------  Results from last 7 days   Lab Units 11/10/22  1433   TROPONIN T ng/mL 0.016           Results from last 7 days   Lab Units 11/13/22  0053 11/12/22  1015 11/12/22  0832 11/12/22  0433 11/11/22  1226 11/11/22  0648 11/11/22  0045 11/10/22  1800 11/10/22  1546 11/10/22  1433   CRP mg/dL  --  14.42*  --   --   --   --  17.21*  --   --  20.13*   LACTATE mmol/L  --   --  3.7*  --  3.9* 4.6* 5.0*   < >  --  5.0*   WBC 10*3/mm3 12.66*  --   --  12.64*  --  9.70 10.93*  --   --  13.35*   HEMOGLOBIN g/dL 9.1*  --   --  8.8*  --  8.7* 9.0*  --   --  9.5*   HEMATOCRIT % 28.0*  --   --  26.5*  --  27.3* 28.1*  --   --  29.4*   MCV fL 96.9  --   --  96.4  --  101.5* 99.3*  --   --  97.7*   MCHC g/dL 32.5  --   --  33.2  --  31.9 32.0  --   --  32.3   PLATELETS 10*3/mm3 45*  --   --  49*  --  43* 47*  --   --  56*   INR   --   --   --   --   --   --   --   --  1.12*  --     < > = values in this interval not displayed.     Results from last 7 days   Lab Units 11/13/22  0053 11/12/22  0433 11/11/22  0648 11/11/22  0045 11/10/22  1433   SODIUM mmol/L 133* 133* 130* 132* 133*   POTASSIUM mmol/L 4.0 4.4 5.6* 4.4 5.2   CHLORIDE mmol/L 96* 97* 96* 95* 94*   CO2 mmol/L 25.0 24.8 18.2* 21.0* 25.9   BUN mg/dL 57* 61* 69* 68* 67*   CREATININE mg/dL 3.23* 3.65* 4.30* 4.53* 4.41*   CALCIUM mg/dL 8.2* 8.3* 8.5* 8.8 9.1   GLUCOSE mg/dL 107* 94 112* 125* 114*   ALBUMIN g/dL  --   --  1.58* 2.11* 2.68*   BILIRUBIN mg/dL  --   --  0.7 0.7 0.8   ALK PHOS U/L  --    --  101 116 134*   AST (SGOT) U/L  --   --  34* 25 22   ALT (SGPT) U/L  --   --  10 11 9   Estimated Creatinine Clearance: 47.3 mL/min (A) (by C-G formula based on SCr of 3.23 mg/dL (H)).  No results found for: AMMONIA    Hemoglobin A1C   Date/Time Value Ref Range Status   11/10/2022 1433 4.70 (L) 4.80 - 5.60 % Final     Lab Results   Component Value Date    HGBA1C 4.70 (L) 11/10/2022     Lab Results   Component Value Date    TSH 0.960 11/10/2022       Blood Culture   Date Value Ref Range Status   11/10/2022 No growth at 24 hours  Preliminary   11/10/2022 No growth at 24 hours  Preliminary     No results found for: URINECX  No results found for: WOUNDCX  No results found for: STOOLCX  No results found for: RESPCX  Pain Management Panel     Pain Management Panel Latest Ref Rng & Units 11/11/2022    AMPHETAMINES SCREEN, URINE Negative Negative    BARBITURATES SCREEN Negative Negative    BENZODIAZEPINE SCREEN, URINE Negative Negative    BUPRENORPHINEUR Negative Negative    COCAINE SCREEN, URINE Negative Negative    METHADONE SCREEN, URINE Negative Negative    METHAMPHETAMINEUR Negative Negative            ----------------------------------------------------------------------------------------------------------------------  Imaging Results (Last 24 Hours)     ** No results found for the last 24 hours. **          ----------------------------------------------------------------------------------------------------------------------    Pertinent Infectious Disease Results    Lactic acid 5.0 on admission.  Procalcitonin 0.87.  Urinalysis unremarkable.  COVID-19 and influenza PCR negative.  Blood cultures from 11/10/2022 show no growth thus far.  CT of the abdomen pelvis from 11/10/2022 reports no acute intra-abdominal abnormality, mildly nodular contour of the liver may represent early cirrhosis, borderline splenomegaly, CT of the chest from 11/10/2022 reports no acute cardiopulmonary disease, bibasilar subsegmental  atelectasis, small hiatal hernia.  Right lower extremity CT reports advanced cellulitis of the right lower extremity extending from the level of the proximal thigh through the foot with maximum thickness of the inflammatory change and stranding medially measuring greater than 15 cm at the level of the distal thigh and knee, no drainable fluid collection no subcutaneous gas, no focal erosive changes to suggest osteomyelitis at this time.        GI PCR negative.  Urinalysis from 11/11/2022 unremarkable.  Right lower extremity venous duplex negative for DVT.  Right lower extremity erythema improved but continues with significant edema.      Assessment/Plan       Assessment     Septic shock with lactic acid greater than 4 on admission  History of right knee pyogenic arthritis with MRSA with superimposed right knee chronic osteomyelitis  Right lower extremity cellulitis and lymphedema        Plan        Patient resting comfortably in bed this morning.  Right lower extremity erythema improving.  WBC stable at 12.66.  CRP improving at 14.42.    Based on previous culture with MRSA from joint aspiration at outside facility recommend to continue vancomycin monotherapy.  Patient will require prolonged course of antibiotic therapy for chronic osteomyelitis of the right knee secondary to complication from recurrent partially treated right knee septic arthritis and will need prolonged course of IV antibiotic therapy x8 weeks followed by oral suppressive therapy with very grim prognosis regarding clearing the infection without repetitive washout and aspiration.  Poor orthopedic surgery risk outweighs benefit for surgical intervention at this time.    Code Status:   Code Status and Medical Interventions:   Ordered at: 11/10/22 1939     Level Of Support Discussed With:    Patient     Code Status (Patient has no pulse and is not breathing):    CPR (Attempt to Resuscitate)     Medical Interventions (Patient has pulse or is breathing):     Full Support       RUKHSANA Kennedy  11/13/22  12:18 EST     Electronically signed by RUKHSANA Kennedy, 11/13/22, 12:19 PM EST.

## 2022-11-13 NOTE — PROGRESS NOTES
Patient continues on day 4 vancomycin. A 22.5 hour post infusion vancomycin level was reported as 16.4 mg/L today. Based on this level, the AUC is calculated as 523. Will continue current vancomycin dose of 1 gm daily to target an AUC of 400-600. Will continue to follow and recheck a level when appropriate.    Thank you,    Sydnee Mariee, PharmD  11/13/2022  17:26 EST

## 2022-11-14 LAB
ANION GAP SERPL CALCULATED.3IONS-SCNC: 8.5 MMOL/L (ref 5–15)
BUN SERPL-MCNC: 49 MG/DL (ref 6–20)
BUN/CREAT SERPL: 19.6 (ref 7–25)
CALCIUM SPEC-SCNC: 8.1 MG/DL (ref 8.6–10.5)
CHLORIDE SERPL-SCNC: 97 MMOL/L (ref 98–107)
CO2 SERPL-SCNC: 24.5 MMOL/L (ref 22–29)
CREAT SERPL-MCNC: 2.5 MG/DL (ref 0.57–1)
DEPRECATED RDW RBC AUTO: 50.3 FL (ref 37–54)
EGFRCR SERPLBLD CKD-EPI 2021: 21.9 ML/MIN/1.73
ERYTHROCYTE [DISTWIDTH] IN BLOOD BY AUTOMATED COUNT: 14.7 % (ref 12.3–15.4)
FERRITIN SERPL-MCNC: 688.5 NG/ML (ref 13–150)
GLUCOSE SERPL-MCNC: 130 MG/DL (ref 65–99)
HCT VFR BLD AUTO: 26.4 % (ref 34–46.6)
HGB BLD-MCNC: 8.8 G/DL (ref 12–15.9)
MAGNESIUM SERPL-MCNC: 1.5 MG/DL (ref 1.6–2.6)
MCH RBC QN AUTO: 32 PG (ref 26.6–33)
MCHC RBC AUTO-ENTMCNC: 33.3 G/DL (ref 31.5–35.7)
MCV RBC AUTO: 96 FL (ref 79–97)
PLATELET # BLD AUTO: 41 10*3/MM3 (ref 140–450)
PMV BLD AUTO: 10.1 FL (ref 6–12)
POTASSIUM SERPL-SCNC: 3.8 MMOL/L (ref 3.5–5.2)
RBC # BLD AUTO: 2.75 10*6/MM3 (ref 3.77–5.28)
REF LAB TEST METHOD: NORMAL
RETICS # AUTO: 0.04 10*6/MM3 (ref 0.02–0.13)
RETICS/RBC NFR AUTO: 1.48 % (ref 0.7–1.9)
SODIUM SERPL-SCNC: 130 MMOL/L (ref 136–145)
WBC NRBC COR # BLD: 11.67 10*3/MM3 (ref 3.4–10.8)

## 2022-11-14 PROCEDURE — 25010000002 MORPHINE PER 10 MG: Performed by: HOSPITALIST

## 2022-11-14 PROCEDURE — 83735 ASSAY OF MAGNESIUM: CPT | Performed by: STUDENT IN AN ORGANIZED HEALTH CARE EDUCATION/TRAINING PROGRAM

## 2022-11-14 PROCEDURE — 85397 CLOTTING FUNCT ACTIVITY: CPT | Performed by: INTERNAL MEDICINE

## 2022-11-14 PROCEDURE — 99233 SBSQ HOSP IP/OBS HIGH 50: CPT | Performed by: STUDENT IN AN ORGANIZED HEALTH CARE EDUCATION/TRAINING PROGRAM

## 2022-11-14 PROCEDURE — 80048 BASIC METABOLIC PNL TOTAL CA: CPT

## 2022-11-14 PROCEDURE — 63710000001 DIPHENHYDRAMINE PER 50 MG: Performed by: STUDENT IN AN ORGANIZED HEALTH CARE EDUCATION/TRAINING PROGRAM

## 2022-11-14 PROCEDURE — 83921 ORGANIC ACID SINGLE QUANT: CPT | Performed by: INTERNAL MEDICINE

## 2022-11-14 PROCEDURE — 99232 SBSQ HOSP IP/OBS MODERATE 35: CPT | Performed by: INTERNAL MEDICINE

## 2022-11-14 PROCEDURE — 85045 AUTOMATED RETICULOCYTE COUNT: CPT | Performed by: INTERNAL MEDICINE

## 2022-11-14 PROCEDURE — 85027 COMPLETE CBC AUTOMATED: CPT | Performed by: STUDENT IN AN ORGANIZED HEALTH CARE EDUCATION/TRAINING PROGRAM

## 2022-11-14 PROCEDURE — 82668 ASSAY OF ERYTHROPOIETIN: CPT | Performed by: INTERNAL MEDICINE

## 2022-11-14 PROCEDURE — 84238 ASSAY NONENDOCRINE RECEPTOR: CPT | Performed by: INTERNAL MEDICINE

## 2022-11-14 PROCEDURE — 25010000002 MAGNESIUM SULFATE 2 GM/50ML SOLUTION: Performed by: STUDENT IN AN ORGANIZED HEALTH CARE EDUCATION/TRAINING PROGRAM

## 2022-11-14 PROCEDURE — 82728 ASSAY OF FERRITIN: CPT | Performed by: INTERNAL MEDICINE

## 2022-11-14 PROCEDURE — 25010000002 VANCOMYCIN 5 G RECONSTITUTED SOLUTION: Performed by: INTERNAL MEDICINE

## 2022-11-14 PROCEDURE — 99254 IP/OBS CNSLTJ NEW/EST MOD 60: CPT | Performed by: INTERNAL MEDICINE

## 2022-11-14 PROCEDURE — 25010000002 HEPARIN (PORCINE) PER 1000 UNITS: Performed by: HOSPITALIST

## 2022-11-14 RX ORDER — LOPERAMIDE HYDROCHLORIDE 2 MG/1
2 CAPSULE ORAL ONCE
Status: DISCONTINUED | OUTPATIENT
Start: 2022-11-14 | End: 2022-11-18 | Stop reason: HOSPADM

## 2022-11-14 RX ORDER — GUAIFENESIN 600 MG/1
600 TABLET, EXTENDED RELEASE ORAL EVERY 12 HOURS SCHEDULED
Status: DISCONTINUED | OUTPATIENT
Start: 2022-11-14 | End: 2022-11-15

## 2022-11-14 RX ORDER — FOLIC ACID 1 MG/1
500 TABLET ORAL DAILY
Status: DISCONTINUED | OUTPATIENT
Start: 2022-11-14 | End: 2022-11-18 | Stop reason: HOSPADM

## 2022-11-14 RX ORDER — MAGNESIUM SULFATE HEPTAHYDRATE 40 MG/ML
2 INJECTION, SOLUTION INTRAVENOUS ONCE
Status: COMPLETED | OUTPATIENT
Start: 2022-11-14 | End: 2022-11-14

## 2022-11-14 RX ADMIN — OXYCODONE 5 MG: 5 TABLET ORAL at 02:10

## 2022-11-14 RX ADMIN — Medication 10 ML: at 19:52

## 2022-11-14 RX ADMIN — Medication 500 MCG: at 16:36

## 2022-11-14 RX ADMIN — OXYCODONE HYDROCHLORIDE 15 MG: 15 TABLET ORAL at 12:04

## 2022-11-14 RX ADMIN — OXYCODONE 5 MG: 5 TABLET ORAL at 15:48

## 2022-11-14 RX ADMIN — OXYCODONE HYDROCHLORIDE 15 MG: 15 TABLET ORAL at 21:17

## 2022-11-14 RX ADMIN — METOPROLOL TARTRATE 12.5 MG: 25 TABLET, FILM COATED ORAL at 19:48

## 2022-11-14 RX ADMIN — NYSTATIN 1 APPLICATION: 100000 CREAM TOPICAL at 09:30

## 2022-11-14 RX ADMIN — MORPHINE SULFATE 2 MG: 2 INJECTION, SOLUTION INTRAMUSCULAR; INTRAVENOUS at 20:08

## 2022-11-14 RX ADMIN — ZINC OXIDE: 200 OINTMENT TOPICAL at 19:51

## 2022-11-14 RX ADMIN — GUAIFENESIN 600 MG: 600 TABLET, EXTENDED RELEASE ORAL at 18:00

## 2022-11-14 RX ADMIN — DIPHENHYDRAMINE HYDROCHLORIDE 25 MG: 25 CAPSULE ORAL at 23:44

## 2022-11-14 RX ADMIN — ZINC OXIDE: 200 OINTMENT TOPICAL at 09:30

## 2022-11-14 RX ADMIN — HEPARIN SODIUM 5000 UNITS: 5000 INJECTION INTRAVENOUS; SUBCUTANEOUS at 09:30

## 2022-11-14 RX ADMIN — NYSTATIN 1 APPLICATION: 100000 CREAM TOPICAL at 19:49

## 2022-11-14 RX ADMIN — FLUTICASONE PROPIONATE 2 SPRAY: 50 SPRAY, METERED NASAL at 09:30

## 2022-11-14 RX ADMIN — VANCOMYCIN HYDROCHLORIDE 1250 MG: 5 INJECTION, POWDER, LYOPHILIZED, FOR SOLUTION INTRAVENOUS at 17:54

## 2022-11-14 RX ADMIN — Medication 10 ML: at 19:53

## 2022-11-14 RX ADMIN — HEPARIN SODIUM 5000 UNITS: 5000 INJECTION INTRAVENOUS; SUBCUTANEOUS at 19:57

## 2022-11-14 RX ADMIN — MORPHINE SULFATE 2 MG: 2 INJECTION, SOLUTION INTRAMUSCULAR; INTRAVENOUS at 09:38

## 2022-11-14 RX ADMIN — PANTOPRAZOLE SODIUM 40 MG: 40 TABLET, DELAYED RELEASE ORAL at 09:30

## 2022-11-14 RX ADMIN — LEVOTHYROXINE SODIUM 100 MCG: 100 TABLET ORAL at 09:30

## 2022-11-14 RX ADMIN — MAGNESIUM SULFATE HEPTAHYDRATE 2 G: 40 INJECTION, SOLUTION INTRAVENOUS at 19:42

## 2022-11-14 RX ADMIN — OXYCODONE HYDROCHLORIDE 15 MG: 15 TABLET ORAL at 07:17

## 2022-11-14 RX ADMIN — OXYCODONE HYDROCHLORIDE 15 MG: 15 TABLET ORAL at 18:00

## 2022-11-14 NOTE — PROGRESS NOTES
Nephrology Progress Note      Subjective     Feeling better, no chest pain or shortness of breath.     Objective       Vital signs :     Temp:  [97.9 °F (36.6 °C)-99.2 °F (37.3 °C)] 97.9 °F (36.6 °C)  Heart Rate:  [] 101  Resp:  [13-20] 16  BP: ()/() 109/83    Intake/Output                             11/12/22 0701 - 11/13/22 0700 11/13/22 0701 - 11/14/22 0700 11/14/22 0701 - 11/15/22 0700     3061-1546 9989-3343 Total 4567-1875 6550-8281 Total 8157-6429 1292-7370 Total                    Intake    P.O.  360  -- 360  300  -- 300  240  -- 240    I.V.  680  -- 680  444.4  -- 444.4  --  -- --    Total Intake 1040 -- 1040 744.4 -- 744.4 240 -- 240       Output    Urine  200  300 500  1100  1050 2150  1000  -- 1000    Total Output 200  1050 2150 1000 -- 1000           Physical Exam:    General Appearance : not in acute distress  Lungs : clear to auscultation, respirations regular  Heart :  regular rhythm & normal rate, normal S1, S2 and no murmur, no rub  Abdomen : soft, non distended  Extremities : 1+ edema  Neurologic :   orientated to person, place, time and situation, Grossly no focal deficits    Laboratory Data :     Albumin No results found for: ALBUMIN   Magnesium No results found for: MG       PTH               No results found for: PTH    CBC and coagulation:  Results from last 7 days   Lab Units 11/14/22  0821 11/13/22  0053 11/12/22  1015 11/12/22  0832 11/12/22  0433 11/11/22  1226 11/11/22  0648 11/11/22  0045 11/10/22  1800 11/10/22  1546 11/10/22  1433   PROCALCITONIN ng/mL  --   --   --   --   --   --   --  0.87*  --   --  1.13*   LACTATE mmol/L  --   --   --  3.7*  --  3.9* 4.6* 5.0*   < >  --  5.0*   CRP mg/dL  --   --  14.42*  --   --   --   --  17.21*  --   --  20.13*   WBC 10*3/mm3 11.67* 12.66*  --   --  12.64*  --  9.70 10.93*  --   --  13.35*   HEMOGLOBIN g/dL 8.8* 9.1*  --   --  8.8*  --  8.7* 9.0*  --   --  9.5*   HEMATOCRIT % 26.4* 28.0*  --   --  26.5*  --  27.3*  28.1*  --   --  29.4*   MCV fL 96.0 96.9  --   --  96.4  --  101.5* 99.3*  --   --  97.7*   MCHC g/dL 33.3 32.5  --   --  33.2  --  31.9 32.0  --   --  32.3   PLATELETS 10*3/mm3 41* 45*  --   --  49*  --  43* 47*  --   --  56*   INR   --   --   --   --   --   --   --   --   --  1.12*  --     < > = values in this interval not displayed.     Acid/base balance:      Renal and electrolytes:    Results from last 7 days   Lab Units 11/14/22  0820 11/13/22  0053 11/12/22  0433 11/11/22  0648 11/11/22  0045   SODIUM mmol/L 130* 133* 133* 130* 132*   POTASSIUM mmol/L 3.8 4.0 4.4 5.6* 4.4   CHLORIDE mmol/L 97* 96* 97* 96* 95*   CO2 mmol/L 24.5 25.0 24.8 18.2* 21.0*   BUN mg/dL 49* 57* 61* 69* 68*   CREATININE mg/dL 2.50* 3.23* 3.65* 4.30* 4.53*   CALCIUM mg/dL 8.1* 8.2* 8.3* 8.5* 8.8     Estimated Creatinine Clearance: 61.1 mL/min (A) (by C-G formula based on SCr of 2.5 mg/dL (H)).  @GFRCG:3@   Liver and pancreatic function:  Results from last 7 days   Lab Units 11/11/22  0648 11/11/22  0045 11/10/22  1546 11/10/22  1433   ALBUMIN g/dL 1.58* 2.11*  --  2.68*   BILIRUBIN mg/dL 0.7 0.7  --  0.8   ALK PHOS U/L 101 116  --  134*   AST (SGOT) U/L 34* 25  --  22   ALT (SGPT) U/L 10 11  --  9   LIPASE U/L  --   --  28  --          Cardiac:      Liver and pancreatic function:  Results from last 7 days   Lab Units 11/11/22  0648 11/11/22  0045 11/10/22  1546 11/10/22  1433   ALBUMIN g/dL 1.58* 2.11*  --  2.68*   BILIRUBIN mg/dL 0.7 0.7  --  0.8   ALK PHOS U/L 101 116  --  134*   AST (SGOT) U/L 34* 25  --  22   ALT (SGPT) U/L 10 11  --  9   LIPASE U/L  --   --  28  --        Medications :     fluticasone, 2 spray, Nasal, Daily  heparin (porcine), 5,000 Units, Subcutaneous, Q12H  levothyroxine, 100 mcg, Oral, Daily  metoprolol tartrate, 12.5 mg, Oral, Q12H  nystatin, 1 application, Topical, BID  oxyCODONE, 15 mg, Oral, 4x Daily  pantoprazole, 40 mg, Oral, QAM  sodium chloride, 10 mL, Intravenous, Q12H  sodium chloride, 10 mL,  Intravenous, Q12H  vancomycin, 1,250 mg, Intravenous, Q24H  zinc oxide, , Topical, BID      lactated ringers, 50 mL/hr, Last Rate: 50 mL/hr (11/13/22 1803)      Assessment & Plan     1. SUZETTE, non ogliguric  2. Hyponatremia likely hypovolumic presumed chronic  3. Anion gap metabolic acidosis  4. Hyperkalemia  5. Anemia and thrombocytopenia  6. History of hep C induced liver Cirrhosis    Cr further improved to 2.5, non oliguric. Oral intake has improved a lot. Metabolic acidosis has resolved as well.   DC IVF, educated and counseled to for PO intake.     SUZETTE likley due to multifactorial, including ATN from prolonged pre renal state with concomitant use of  NSAIDS, and ATN from sepsis.   Other etiology to consider is HUS, and given the history of anemia, thrombocytopenia and hyperkalemia, needs to r/o TMA. Although anemia and thrombocytopenia can be due to cirrhosis with splenomegaly.  Baseline Cr unknown, admitted with 4.5  No hematuria, no proteinuria on UA  No hydronephrosis on CT        Colin Chris MD  11/14/22  13:50 EST

## 2022-11-14 NOTE — CASE MANAGEMENT/SOCIAL WORK
Discharge Planning Assessment  [facility]     Patient Name: Malina Velasquez  MRN: [id number]  Today's Date: 11/14/2022    Admit Date: 11/10/2022    Plan: Pt was admitted on 11/10/22. SS received nursing consult for Possible Septic knee Arthritis. Likely will need disposition planning with hx knee washout. Pt lives at [address] [place] [address]. Pt lives with spouse (Ant) and plans to return home at discharge. Pt does not utilize HH services at this time. Pt did utilize [facility] in the past. Pt PCP Josesito Jacobson. Pt utilizes hospital bed, lift via [employer], walker via private [employer]case for DME needs. Pt utilizes [facility]. Pt does not have no POA/Living will. Pt will need an ambulance to provide transportation at discharge. Pt voiced needing a new hospital bed and new wheelchair. If IV antibiotics are needed at discharge pt is agreeable to administer with education from HH services. If ordered, pt perfers [facility]. SS to follow      Discharge Plan     Row Name 11/14/22 1303       Plan    Plan Pt was admitted on 11/10/22. SS received nursing consult for Possible Septic knee Arthritis. Likely will need disposition planning with hx knee washout. Pt lives at [address] [place], [place] [address]. Pt lives with spouse (Ant) and plans to return home at discharge. Pt does not utilize HH services at this time. Pt did utilize [facility] in the past. Pt PCP Josesito Jacobson. Pt utilizes hospital bed, lift via [employer], walker via private [employer]case for DME needs. Pt utilizes [facility]. Pt does not have no POA/Living will. Pt will need an ambulance to provide transportation at discharge. Pt voiced needing a new hospital bed and new wheelchair. If IV antibiotics are needed at discharge pt is agreeable to administer with education from HH services. If ordered, pt perfers [facility]. SS to follow             SVETA Marmolejo

## 2022-11-14 NOTE — PLAN OF CARE
Goal Outcome Evaluation:  Plan of Care Reviewed With: patient        Progress: no change  Outcome Evaluation: PT is resting in bed at this time. PT remains on RA. PT has been encouraged to turn and reposition Q2 hours. No complaints of pain or discomfort. VSS. Afebrile. Will Continue to Monitor PT.      Problem: Skin Injury Risk Increased  Goal: Skin Health and Integrity  Outcome: Ongoing, Progressing  Intervention: Promote and Optimize Oral Intake  Recent Flowsheet Documentation  Taken 11/13/2022 2015 by Ronna Crawford RN  Oral Nutrition Promotion: rest periods promoted  Intervention: Optimize Skin Protection  Recent Flowsheet Documentation  Taken 11/14/2022 0215 by Ronna Crawford RN  Pressure Reduction Techniques:   weight shift assistance provided   frequent weight shift encouraged  Head of Bed (HOB) Positioning: HOB at 30-45 degrees  Pressure Reduction Devices: pressure-redistributing mattress utilized  Skin Protection:   adhesive use limited   drying agents applied  Taken 11/13/2022 2015 by Ronna Crawford RN  Pressure Reduction Techniques:   weight shift assistance provided   frequent weight shift encouraged  Head of Bed (HOB) Positioning: HOB at 30-45 degrees  Pressure Reduction Devices: pressure-redistributing mattress utilized  Skin Protection:   adhesive use limited   drying agents applied   incontinence pads utilized     Problem: Asthma Comorbidity  Goal: Maintenance of Asthma Control  Outcome: Ongoing, Progressing  Intervention: Maintain Asthma Symptom Control  Recent Flowsheet Documentation  Taken 11/14/2022 0300 by Ronna Crawford RN  Medication Review/Management: medications reviewed  Taken 11/14/2022 0215 by Ronna Crawford RN  Medication Review/Management: medications reviewed  Taken 11/14/2022 0100 by Ronna Crawford RN  Medication Review/Management: medications reviewed  Taken 11/13/2022 2300 by Ronna Crawford RN  Medication Review/Management: medications reviewed  Taken 11/13/2022 2100  by Ronna Crawford RN  Medication Review/Management: medications reviewed  Taken 11/13/2022 2015 by Ronna Crawford RN  Medication Review/Management: medications reviewed  Taken 11/13/2022 1900 by Ronna Crawford RN  Medication Review/Management: medications reviewed     Problem: Heart Failure Comorbidity  Goal: Maintenance of Heart Failure Symptom Control  Outcome: Ongoing, Progressing  Intervention: Maintain Heart Failure-Management  Recent Flowsheet Documentation  Taken 11/14/2022 0300 by Ronna Crawford RN  Medication Review/Management: medications reviewed  Taken 11/14/2022 0215 by Ronna Crawford RN  Medication Review/Management: medications reviewed  Taken 11/14/2022 0100 by Ronna Crawford RN  Medication Review/Management: medications reviewed  Taken 11/13/2022 2300 by Ronna Crawford RN  Medication Review/Management: medications reviewed  Taken 11/13/2022 2100 by Ronna Crawford RN  Medication Review/Management: medications reviewed  Taken 11/13/2022 2015 by Ronna Crawford RN  Medication Review/Management: medications reviewed  Taken 11/13/2022 1900 by Ronna Crawford RN  Medication Review/Management: medications reviewed     Problem: Hypertension Comorbidity  Goal: Blood Pressure in Desired Range  Outcome: Ongoing, Progressing  Intervention: Maintain Blood Pressure Management  Recent Flowsheet Documentation  Taken 11/14/2022 0300 by Ronna Crawford RN  Medication Review/Management: medications reviewed  Taken 11/14/2022 0215 by Ronna Crawford, RN  Medication Review/Management: medications reviewed  Taken 11/14/2022 0100 by Ronna Crawford RN  Medication Review/Management: medications reviewed  Taken 11/13/2022 2300 by Ronna Crawford, RN  Medication Review/Management: medications reviewed  Taken 11/13/2022 2100 by Ronna Crawford, RN  Medication Review/Management: medications reviewed  Taken 11/13/2022 2015 by Ronna Crawford RN  Medication Review/Management: medications reviewed  Taken  11/13/2022 1900 by Ronna Crawford RN  Medication Review/Management: medications reviewed     Problem: Obstructive Sleep Apnea Risk or Actual Comorbidity Management  Goal: Unobstructed Breathing During Sleep  Outcome: Ongoing, Progressing     Problem: Osteoarthritis Comorbidity  Goal: Maintenance of Osteoarthritis Symptom Control  Outcome: Ongoing, Progressing  Intervention: Maintain Osteoarthritis Symptom Control  Recent Flowsheet Documentation  Taken 11/14/2022 0300 by Ronna Crawford, RN  Medication Review/Management: medications reviewed  Taken 11/14/2022 0215 by Ronna Crawford RN  Activity Management:   activity adjusted per tolerance   bedrest  Medication Review/Management: medications reviewed  Taken 11/14/2022 0100 by Ronna Crawford RN  Medication Review/Management: medications reviewed  Taken 11/13/2022 2300 by Ronna Crawford RN  Medication Review/Management: medications reviewed  Taken 11/13/2022 2100 by Ronna Crawford RN  Medication Review/Management: medications reviewed  Taken 11/13/2022 2015 by Ronna Crawford RN  Activity Management:   activity adjusted per tolerance   bedrest  Medication Review/Management: medications reviewed  Taken 11/13/2022 1900 by Ronna Crawford RN  Medication Review/Management: medications reviewed     Problem: Pain Chronic (Persistent) (Comorbidity Management)  Goal: Acceptable Pain Control and Functional Ability  Outcome: Ongoing, Progressing  Intervention: Manage Persistent Pain  Recent Flowsheet Documentation  Taken 11/14/2022 0300 by Ronna Crawford RN  Medication Review/Management: medications reviewed  Taken 11/14/2022 0215 by Ronna Crawford, RN  Bowel Elimination Promotion:   commode/bedpan at bedside   adequate fluid intake promoted  Sleep/Rest Enhancement:   awakenings minimized   consistent schedule promoted   regular sleep/rest pattern promoted   relaxation techniques promoted   room darkened  Medication Review/Management: medications reviewed  Taken  11/14/2022 0100 by Ronna Crawford, RN  Medication Review/Management: medications reviewed  Taken 11/13/2022 2300 by Ronna Crawford RN  Medication Review/Management: medications reviewed  Taken 11/13/2022 2100 by Ronna Crawford RN  Medication Review/Management: medications reviewed  Taken 11/13/2022 2015 by Ronna Crawford, RN  Bowel Elimination Promotion:   commode/bedpan at bedside   privacy promoted  Sleep/Rest Enhancement:   awakenings minimized   consistent schedule promoted   relaxation techniques promoted   room darkened   regular sleep/rest pattern promoted  Medication Review/Management: medications reviewed  Taken 11/13/2022 1900 by Ronna Crawford, RN  Medication Review/Management: medications reviewed  Intervention: Develop Pain Management Plan  Recent Flowsheet Documentation  Taken 11/13/2022 2015 by Ronna Crawford RN  Pain Management Interventions:   massage provided   position adjusted   quiet environment facilitated   see MAR   relaxation techniques promoted   pillow support provided  Intervention: Optimize Psychosocial Wellbeing  Recent Flowsheet Documentation  Taken 11/14/2022 0215 by Ronna Crawford, RN  Supportive Measures:   active listening utilized   counseling provided   relaxation techniques promoted   self-care encouraged  Diversional Activities:   smartphone   television  Family/Support System Care:   support provided   self-care encouraged  Taken 11/13/2022 2015 by Ronna Crawford, RN  Supportive Measures:   active listening utilized   relaxation techniques promoted   self-care encouraged  Diversional Activities:   RPI ([employer])  Family/Support System Care:   self-care encouraged   support provided     Problem: Fall Injury Risk  Goal: Absence of Fall and Fall-Related Injury  Outcome: Ongoing, Progressing  Intervention: Identify and Manage Contributors  Recent Flowsheet Documentation  Taken 11/14/2022 0300 by Ronna Crawford, RN  Medication Review/Management: medications  reviewed  Taken 11/14/2022 0215 by Ronna Crawford RN  Medication Review/Management: medications reviewed  Self-Care Promotion:   independence encouraged   BADL personal objects within reach   BADL personal routines maintained  Taken 11/14/2022 0100 by Ronna Crawford, RN  Medication Review/Management: medications reviewed  Taken 11/13/2022 2300 by Ronna Crawford, RN  Medication Review/Management: medications reviewed  Taken 11/13/2022 2100 by Ronna Crawford, RN  Medication Review/Management: medications reviewed  Taken 11/13/2022 2015 by Ronna Crawford, RN  Medication Review/Management: medications reviewed  Self-Care Promotion:   independence encouraged   BADL personal objects within reach   BADL personal routines maintained  Taken 11/13/2022 1900 by Ronna Crawford, RN  Medication Review/Management: medications reviewed  Intervention: Promote Injury-Free Environment  Recent Flowsheet Documentation  Taken 11/14/2022 0300 by Ronna Crawford, RN  Safety Promotion/Fall Prevention:   activity supervised   assistive device/personal items within reach   clutter free environment maintained   fall prevention program maintained   nonskid shoes/slippers when out of bed   room organization consistent   safety round/check completed  Taken 11/14/2022 0215 by Ronna Crawford, RN  Safety Promotion/Fall Prevention:   clutter free environment maintained   assistive device/personal items within reach   activity supervised   fall prevention program maintained   nonskid shoes/slippers when out of bed   room organization consistent   safety round/check completed  Taken 11/14/2022 0100 by Ronna Crawford, RN  Safety Promotion/Fall Prevention:   activity supervised   assistive device/personal items within reach   clutter free environment maintained   fall prevention program maintained   nonskid shoes/slippers when out of bed   safety round/check completed   room organization consistent  Taken 11/13/2022 2300 by Ronna Crawford,  RN  Safety Promotion/Fall Prevention:   activity supervised   assistive device/personal items within reach   clutter free environment maintained   fall prevention program maintained   nonskid shoes/slippers when out of bed   room organization consistent   safety round/check completed  Taken 11/13/2022 2100 by Ronna Crawford RN  Safety Promotion/Fall Prevention:   activity supervised   clutter free environment maintained   assistive device/personal items within reach   fall prevention program maintained   nonskid shoes/slippers when out of bed   room organization consistent   safety round/check completed  Taken 11/13/2022 2015 by Ronna Crawford RN  Safety Promotion/Fall Prevention:   activity supervised   assistive device/personal items within reach   clutter free environment maintained   fall prevention program maintained   nonskid shoes/slippers when out of bed   safety round/check completed   room organization consistent  Taken 11/13/2022 1900 by Ronna Crawford RN  Safety Promotion/Fall Prevention:   activity supervised   assistive device/personal items within reach   clutter free environment maintained   fall prevention program maintained   nonskid shoes/slippers when out of bed   safety round/check completed   room organization consistent     Problem: Adult Inpatient Plan of Care  Goal: Plan of Care Review  Outcome: Ongoing, Progressing  Flowsheets (Taken 11/14/2022 0317)  Progress: no change  Plan of Care Reviewed With: patient  Outcome Evaluation: PT is resting in bed at this time. PT remains on RA. PT has been encouraged to turn and reposition Q2 hours. No complaints of pain or discomfort. VSS. Afebrile. Will Continue to Monitor PT.  Goal: Patient-Specific Goal (Individualized)  Outcome: Ongoing, Progressing  Goal: Absence of Hospital-Acquired Illness or Injury  Outcome: Ongoing, Progressing  Intervention: Identify and Manage Fall Risk  Recent Flowsheet Documentation  Taken 11/14/2022 0300 by Ty  Ronna DOMINIQUE RN  Safety Promotion/Fall Prevention:   activity supervised   assistive device/personal items within reach   clutter free environment maintained   fall prevention program maintained   nonskid shoes/slippers when out of bed   room organization consistent   safety round/check completed  Taken 11/14/2022 0215 by Ronna Crawford, RN  Safety Promotion/Fall Prevention:   clutter free environment maintained   assistive device/personal items within reach   activity supervised   fall prevention program maintained   nonskid shoes/slippers when out of bed   room organization consistent   safety round/check completed  Taken 11/14/2022 0100 by Ronna Crawford, RN  Safety Promotion/Fall Prevention:   activity supervised   assistive device/personal items within reach   clutter free environment maintained   fall prevention program maintained   nonskid shoes/slippers when out of bed   safety round/check completed   room organization consistent  Taken 11/13/2022 2300 by Ronna Crawford, RN  Safety Promotion/Fall Prevention:   activity supervised   assistive device/personal items within reach   clutter free environment maintained   fall prevention program maintained   nonskid shoes/slippers when out of bed   room organization consistent   safety round/check completed  Taken 11/13/2022 2100 by Ronna Crawford, RN  Safety Promotion/Fall Prevention:   activity supervised   clutter free environment maintained   assistive device/personal items within reach   fall prevention program maintained   nonskid shoes/slippers when out of bed   room organization consistent   safety round/check completed  Taken 11/13/2022 2015 by Ronna Crawford, RN  Safety Promotion/Fall Prevention:   activity supervised   assistive device/personal items within reach   clutter free environment maintained   fall prevention program maintained   nonskid shoes/slippers when out of bed   safety round/check completed   room organization consistent  Taken  11/13/2022 1900 by Ronna Crawford RN  Safety Promotion/Fall Prevention:   activity supervised   assistive device/personal items within reach   clutter free environment maintained   fall prevention program maintained   nonskid shoes/slippers when out of bed   safety round/check completed   room organization consistent  Intervention: Prevent Skin Injury  Recent Flowsheet Documentation  Taken 11/14/2022 0215 by Ronna Crawford RN  Body Position:   side-lying   right  Skin Protection:   adhesive use limited   drying agents applied  Taken 11/13/2022 2015 by Ronna Crawford RN  Body Position:   side-lying   left  Skin Protection:   adhesive use limited   drying agents applied   incontinence pads utilized  Intervention: Prevent and Manage VTE (Venous Thromboembolism) Risk  Recent Flowsheet Documentation  Taken 11/14/2022 0215 by Ronna Crawford RN  Activity Management:   activity adjusted per tolerance   bedrest  Taken 11/13/2022 2015 by Ronna Crawford RN  Activity Management:   activity adjusted per tolerance   bedrest  VTE Prevention/Management: (Heparin) other (see comments)  Intervention: Prevent Infection  Recent Flowsheet Documentation  Taken 11/14/2022 0300 by Ronna Crawford RN  Infection Prevention: rest/sleep promoted  Taken 11/14/2022 0215 by Ronna Crawford RN  Infection Prevention: rest/sleep promoted  Taken 11/14/2022 0100 by Ronna Crawford RN  Infection Prevention: rest/sleep promoted  Taken 11/13/2022 2300 by Ronna Crawford RN  Infection Prevention: rest/sleep promoted  Taken 11/13/2022 2100 by Ronna Crawford RN  Infection Prevention: rest/sleep promoted  Taken 11/13/2022 2015 by Ronna Crawford RN  Infection Prevention: rest/sleep promoted  Taken 11/13/2022 1900 by Ronna Crawford RN  Infection Prevention: rest/sleep promoted  Goal: Optimal Comfort and Wellbeing  Outcome: Ongoing, Progressing  Intervention: Monitor Pain and Promote Comfort  Recent Flowsheet Documentation  Taken 11/13/2022  2015 by Ronna Crawford, RN  Pain Management Interventions:   massage provided   position adjusted   quiet environment facilitated   see MAR   relaxation techniques promoted   pillow support provided  Intervention: Provide Person-Centered Care  Recent Flowsheet Documentation  Taken 11/14/2022 0215 by Ronna Crawford, RN  Trust Relationship/Rapport:   care explained   choices provided   empathic listening provided   questions answered   thoughts/feelings acknowledged   reassurance provided  Taken 11/13/2022 2015 by Ronna Crawford, RN  Trust Relationship/Rapport:   care explained   choices provided   empathic listening provided   questions answered   reassurance provided   thoughts/feelings acknowledged  Goal: Readiness for Transition of Care  Outcome: Ongoing, Progressing

## 2022-11-14 NOTE — PLAN OF CARE
Goal Outcome Evaluation:           Progress: no change  Outcome Evaluation: Pt has been complaining of pain this shift, both scheduled and PRN pain meds provided. Her leg was wrapped with ACE wraps, up to the knee. The pt was unable to lift her leg enough to wrap the thigh. The pt has remained on room air as well. She denies any chest pain but complains of intermittent shortness of air. No complaints of nausea but pt does have a nonproductive cough. The pt had an episode of V tach which was nonsustained this shift. MD made aware.

## 2022-11-14 NOTE — CONSULTS
Date:  11/14/22  Referring Provider:   Reason for Consultation:     Patient Care Team:  Jeremiah Topete MD as PCP - General (Family Medicine)    Chief complaint:       History of present illness:  Malina Velasquez is a 57 y.o. year-old female seen today at the request of Dr. Licea  for evaluation and treatment of anemia and thrombocytopenia.     57-year-old female with a history of congestive heart failure, hypertension, mitral valve prolapse, COPD, hepatitis C with presumed cirrhosis who presented to the emergency room 11/10 due to generalized weakness due to sepsis.  Sepsis thought to be due to right knee osteoarthritis.  Patient noted erythema of the right knee 3 days prior to presentation.  On presentation she was also noted to have acute kidney injury, also noted to have anemia and thrombocytopenia.  We are consulted due to possibility of HUS/TTP.    On 11/10 her white count was noted to be 13.35, hemoglobin 9.5, hematocrit 29.4, MCV 97.7, platelets 56.  Work-up there as far has shown iron studies with iron 51, TIBC low at 155, vitamin B12 elevated at 1932, folate 4.94, , haptoglobin 123 peripheral smear with moderate macrocytic anemia with rare nucleated red cells..  No schistocytes identified.  Relative neutrophilia.  Marked thrombocytopenia with normal platelet morphology.  No platelet clumps identified.  No blasts or dysplastic white cells noted.    She reports being told she has anemia three years ago when she had developed septic arthritis of her knee.   Thinks she may have required transfusions during this time period. Also thinks she may have had a bone marrow biopsy under anesthesia, but unsure. Denies following up with a hematologist. Denies bleeding or easy bruising aside from this hospitalization. Has been told she needs to be on folic acid, but stopped taking it due to feeling overwhelmed with her medications. Denies alcohol or drug use. Thinks Hepatis C is from a tattoo in 1980s.        History  Past Medical History:   Diagnosis Date   • CHF (congestive heart failure) ([facility])    • COPD (chronic obstructive pulmonary disease) ([facility])    • Edema    • Hep C w/o coma, chronic ([facility])    • Hypertension    • Mitral valve prolapse        Past Surgical History:   Procedure Laterality Date   • GASTRIC BANDING     • JOINT ASPIRATION AND/OR INJECTION Right 07/2022       Family History   Problem Relation Age of Onset   • Hypertension Mother    • Breast cancer Mother    • COPD Father        Social History     Tobacco Use   • Smoking status: Never   Vaping Use   • Vaping Use: Never used   Substance Use Topics   • Alcohol use: Not Currently   • Drug use: Never       Allergies:  Sulfa antibiotics, Nsaids, and Penicillins    Outpatient Medications:  Medications Prior to Admission   Medication Sig Dispense Refill Last Dose   • bumetanide (BUMEX) 2 MG tablet Take 1 tablet by mouth 2 (Two) Times a Day.   11/10/2022   • cetirizine (zyrTEC) 10 MG tablet Take 1 tablet by mouth 2 (Two) Times a Day As Needed for Allergies.   Past Week   • diphenhydrAMINE (BENADRYL) 25 mg capsule Take 1 capsule by mouth At Night As Needed for Sleep. Prior to [facility] Admission, Patient was on:  1 to 2 caps at bedtime as needed   Past Week   • docusate sodium (COLACE) 100 MG capsule Take 1 capsule by mouth 2 (Two) Times a Day As Needed for Constipation.   Past Week   • fluticasone (FLONASE) 50 MCG/ACT nasal spray 2 sprays into the nostril(s) as directed by provider Daily.   11/10/2022   • hydrOXYzine (ATARAX) 50 MG tablet Take 1 tablet by mouth 3 (Three) Times a Day As Needed for Anxiety.   Past Week   • levothyroxine (SYNTHROID, LEVOTHROID) 100 MCG tablet Take 1 tablet by mouth Daily.   11/10/2022   • metoprolol tartrate (LOPRESSOR) 50 MG tablet Take 12.5 mg by mouth 2 (Two) Times a Day As Needed. Prior to [facility] Admission, Patient was on:  if heart rate greater than 100   Past Month   • nystatin (MYCOSTATIN) 803460 UNIT/GM cream Apply 1  application topically to the appropriate area as directed 2 (Two) Times a Day. Prior to [facility] Admission, Patient was on:  Mixes with Zinc oxide, uses on stomach folds   11/10/2022   • nystatin (MYCOSTATIN) 760775 UNIT/GM powder Apply 1 application topically to the appropriate area as directed 2 (Two) Times a Day As Needed. Prior to [facility] Admission, Patient was on:  stomach folds   11/10/2022   • omeprazole (priLOSEC) 40 MG capsule Take 1 capsule by mouth Daily.   11/10/2022   • oxyCODONE (ROXICODONE) 15 MG immediate release tablet Take 1 tablet by mouth 4 (Four) Times a Day.   11/10/2022   • oxyCODONE (ROXICODONE) 5 MG immediate release tablet Take 1 tablet by mouth 2 (Two) Times a Day As Needed for Moderate Pain. Prior to [facility] Admission, Patient was on:  breakthrough pain   11/10/2022   • traZODone (DESYREL) 50 MG tablet Take 1 tablet by mouth At Night As Needed for Sleep. Prior to [facility] Admission, Patient was on:  1 to 2 tabs at bedtime as needed   Past Month   • zinc oxide 13 % cream cream Apply 1 application topically to the appropriate area as directed 2 (Two) Times a Day. Prior to [facility] Admission, Patient was on:  mixes with Nystatin, uses on stomach folds.   11/10/2022       Inpatient Medications:  Scheduled Meds:  fluticasone, 2 spray, Nasal, Daily  heparin (porcine), 5,000 Units, Subcutaneous, Q12H  levothyroxine, 100 mcg, Oral, Daily  metoprolol tartrate, 12.5 mg, Oral, Q12H  nystatin, 1 application, Topical, BID  oxyCODONE, 15 mg, Oral, 4x Daily  pantoprazole, 40 mg, Oral, QAM  sodium chloride, 10 mL, Intravenous, Q12H  sodium chloride, 10 mL, Intravenous, Q12H  vancomycin, 1,250 mg, Intravenous, Q24H  zinc oxide, , Topical, BID        Continuous Infusions:  lactated ringers, 50 mL/hr, Last Rate: 50 mL/hr (11/13/22 1803)        PRN Meds:  •  cetirizine  •  diphenhydrAMINE  •  docusate sodium  •  hydrOXYzine  •  Morphine  •  nitroglycerin  •  nystatin  •  oxyCODONE  •  sodium chloride  •  sodium  chloride  •  sodium chloride  •  traZODone    Review of Systems  A comprehensive 14 point review of systems was performed.  Significant findings as mentioned above.  All other systems reviewed and are negative.       Physical Exam:  Vital Signs   Patient Vitals for the past 24 hrs:   BP Temp Temp src Pulse Resp SpO2 Weight   11/14/22 1200 -- 97.9 °F (36.6 °C) Oral -- -- -- --   11/14/22 1103 -- -- -- 101 16 100 % --   11/14/22 1003 109/83 -- -- 95 17 95 % --   11/14/22 0903 104/84 -- -- 95 16 96 % --   11/14/22 0803 109/58 -- -- 96 13 94 % --   11/14/22 0800 -- 98.3 °F (36.8 °C) Oral -- -- -- --   11/14/22 0700 117/78 -- -- 94 16 91 % --   11/14/22 0400 -- 98.5 °F (36.9 °C) Oral -- -- -- (!) 288 kg (635 lb 12.9 oz)   11/14/22 0200 119/71 -- -- 92 20 97 % --   11/14/22 0100 105/56 -- -- 92 20 92 % --   11/14/22 0000 94/57 98.7 °F (37.1 °C) Oral 91 20 97 % --   11/13/22 2300 112/71 -- -- 90 20 95 % --   11/13/22 2200 134/68 -- -- 95 20 93 % --   11/13/22 2100 (!) 118/102 -- -- 98 18 98 % --   11/13/22 2037 103/67 -- -- 95 -- -- --   11/13/22 2000 109/66 99.2 °F (37.3 °C) Oral 93 18 91 % --   11/13/22 1900 116/66 -- -- 91 16 95 % --   11/13/22 1801 110/62 -- -- 90 -- 92 % --   11/13/22 1700 110/60 -- -- 89 -- 96 % --   11/13/22 1600 105/51 98.4 °F (36.9 °C) Oral 92 14 98 % --   11/13/22 1500 109/61 -- -- 81 -- 90 % --   11/13/22 1400 113/66 -- -- 85 -- 97 % --       General:  Awake, alert and oriented, in no distress, morbidly obese   HEENT:  Pupils are equal, round and reactive to light and accommodation  Neck:  No JVD, thyromegaly or lymphadenopathy  CV:  Regular rate and rhythm, no murmurs, rubs or gallops  Resp:  Lungs are clear to auscultation bilaterally  Abd:  Soft, non-tender, non-distended, bowel sounds present, no organomegaly appreciated   Ext:  No clubbing, cyanosis or edema. +multiple ecchymoses R>L upper extremities. R knee wrapped with ACE bandage   Lymph:  No cervical, supraclavicular, axillary,  inguinal or femoral adenopathy  Neuro:  MS as above, CN II-XII intact, grossly non-focal exam      Labs / Studies:  Lab Results   Component Value Date    WBC 11.67 (H) 11/14/2022    HGB 8.8 (L) 11/14/2022    HCT 26.4 (L) 11/14/2022    MCV 96.0 11/14/2022    RDW 14.7 11/14/2022    PLT 41 (C) 11/14/2022    NEUTRORELPCT 78.0 (H) 11/12/2022    LYMPHORELPCT 11.3 (L) 11/12/2022    MONORELPCT 7.4 11/12/2022    EOSRELPCT 0.2 (L) 11/12/2022    BASORELPCT 0.3 11/12/2022    NEUTROABS 8.86 (H) 11/13/2022    LYMPHSABS 1.43 11/12/2022       Lab Results   Component Value Date     (L) 11/14/2022    K 3.8 11/14/2022    CO2 24.5 11/14/2022    CL 97 (L) 11/14/2022    BUN 49 (H) 11/14/2022    CREATININE 2.50 (H) 11/14/2022    GLUCOSE 130 (H) 11/14/2022    CALCIUM 8.1 (L) 11/14/2022    ALKPHOS 101 11/11/2022    AST 34 (H) 11/11/2022    ALT 10 11/11/2022    BILITOT 0.7 11/11/2022    ALBUMIN 1.58 (L) 11/11/2022    PROTEINTOT 5.9 (L) 11/11/2022       Imaging Results (Last 72 Hours)     ** No results found for the last 72 hours. **        Peripheral smear:  Pathologist Interpretation Moderate macrocytic anemia with rare nucleated red cell. No schistocytes identified. Relative neutrophilia. Marked thrombocytopenia with normal platelet morphology. No platelet clumps identified. No blasts or dysplastic white cells identified.     Component      Latest Ref Rng & Units 11/11/2022   Iron      37 - 145 mcg/dL 51   Iron Saturation      20 - 50 % 33   Transferrin      200 - 360 mg/dL 104 (L)   TIBC      298 - 536 mcg/dL 155 (L)   Vitamin B-12      211 - 946 pg/mL 1,932 (H)   Folate      4.78 - 24.20 ng/mL 4.94   LDH      135 - 214 U/L 244 (H)   Haptoglobin      30 - 200 mg/dL 123       Assessment & Plan:  Malina Velasquez is a 57 y.o. year-old female presenting with sepsis due to R knee osteoarthritis and SUZETTE. We are consulted due to anemia and thrombocytopenia.      #Macrocytic anemia   - Suspect multifactorial given underlying liver and renal  dysfunction and folate deficiency. Unlikely to be hemolyzing given normal LDH and haptoglobin. No schistocytes on peripheral smear noted. Unlikely to be TTP/HUS, but will check [id number]   - CBC on presentation with H/H 9.5/29.4, MCV 97.7   - Peripheral smear with: moderate macrocytic anemia, no schistocytes identified  - LDH elevated to 244, haptoglobin 123   - B12 elevated to 1932, folate level of 4.94  - I started patient on folic acid 500 mcg given low folate level   - Check ferritin level, reticulocyte, soluble transferrin receptor, methylmalonic acid, and erythropoietin level   - Also ordering for a complete U/S of the abdomen to assess for hepatosplenomegaly       #Thrombocytopenia   #SUZETTE   - Suspect due to underlying liver dysfunction/cirrhosis due to chronic untreated Hepatitis C and possible splenic sequestration   - Unlikely to be TTP/HUS given no schistocytes on peripheral smear and normal LDH/haptoglobin   - Check [id number] level       #Neutrophilic Leukocytosis   -2/2 underlying sepsis   peripheral smear with moderate macrocytic anemia with rare nucleated red cells..  No schistocytes identified.  Relative neutrophilia.  Marked thrombocytopenia with normal platelet morphology.  No platelet clumps identified.  No blasts or dysplastic white cells noted.  - cont to garry Hampton MD  11/14/22  13:29 EST

## 2022-11-14 NOTE — PROGRESS NOTES
Pharmacokinetics Service Note:    Ms. Velasqeuz continues on day 5 of 57 of vancomycin for her partially treated MRSA R knee septic arthritis with probable osteomyelitis.  The current dosage is 1 gm q24hrs.  Based on her improving renal function (ClCr ~ 61 mL/min with Cr down to 2.5 mg/dL), will increase the dosage to 1.25 gm q24hrs from this evening to target an AUC range of 500-600 mg/L.hr given the MRSA and bone/joint involvement.  Will continue to follow her renal function closely and monitor/adjust as appropriate.     Thank you.  Abiola Wang, Pharm.D.  11/14/2022  12:20 EST

## 2022-11-14 NOTE — PROGRESS NOTES
PROGRESS NOTE         Patient Identification:  Name:  Malina Velasquez  Age:  57 y.o.  Sex:  female  :  1965  MRN:  [id number]  Visit Number:  [id number]  Primary Care Provider:  Jeremiah Topete MD         LOS: 4 days       ----------------------------------------------------------------------------------------------------------------------  Subjective       Chief Complaints:    Weakness - Generalized (Pt states dr sent her here for high wbc and kidney function )        Interval History:      Patient sitting up in bed this morning.  Reports continued right lower extremity pain.  Continues with right lower extremity significant lymphedema, erythema and warmth.  WBC improved at  11.67.     Review of Systems:    Constitutional: no fever, chills and night sweats.  Generalized fatigue.  Eyes: no eye drainage, itching or redness.  HEENT: no mouth sores, dysphagia or nose bleed.  Respiratory: no for shortness of breath, cough or production of sputum.  Cardiovascular: no chest pain, no palpitations, no orthopnea.  Gastrointestinal: no nausea, vomiting or diarrhea. No abdominal pain, hematemesis or rectal bleeding.  Genitourinary: no dysuria or polyuria.  Hematologic/lymphatic: no lymph node abnormalities, no easy bruising or easy bleeding.  Musculoskeletal: Right lower extremity pain.  Skin: No rash and no itching.  Neurological: no loss of consciousness, no seizure, no headache.  Behavioral/Psych: no depression or suicidal ideation.  Endocrine: no hot flashes.  Immunologic: negative.    ----------------------------------------------------------------------------------------------------------------------      Objective       Current [facility] Meds:  fluticasone, 2 spray, Nasal, Daily  heparin (porcine), 5,000 Units, Subcutaneous, Q12H  levothyroxine, 100 mcg, Oral, Daily  metoprolol tartrate, 12.5 mg, Oral, Q12H  nystatin, 1 application, Topical, BID  oxyCODONE, 15 mg, Oral, 4x Daily  pantoprazole, 40  mg, Oral, QAM  sodium chloride, 10 mL, Intravenous, Q12H  sodium chloride, 10 mL, Intravenous, Q12H  vancomycin, 1,000 mg, Intravenous, Q24H  zinc oxide, , Topical, BID      lactated ringers, 50 mL/hr, Last Rate: 50 mL/hr (11/13/22 1803)      ----------------------------------------------------------------------------------------------------------------------    Vital Signs:  Temp:  [98.3 °F (36.8 °C)-99.2 °F (37.3 °C)] 98.3 °F (36.8 °C)  Heart Rate:  [78-98] 94  Resp:  [13-20] 16  BP: ()/() 117/78  Mean Arterial Pressure (Non-Invasive) for the past 24 hrs (Last 3 readings):   Noninvasive MAP (mmHg)   11/14/22 0700 91   11/14/22 0200 89   11/14/22 0100 77     SpO2 Percentage    11/14/22 0100 11/14/22 0200 11/14/22 0700   SpO2: 92% 97% 91%     SpO2:  [90 %-98 %] 91 %  on   ;   Device (Oxygen Therapy): room air    Body mass index is 91.23 kg/m².  Wt Readings from Last 3 Encounters:   11/14/22 (!) 288 kg (635 lb 12.9 oz)        Intake/Output Summary (Last 24 hours) at 11/14/2022 1059  Last data filed at 11/14/2022 0800  Gross per 24 hour   Intake 834.4 ml   Output 2450 ml   Net -1615.6 ml     Diet Regular; Cardiac, Renal  ----------------------------------------------------------------------------------------------------------------------      Physical Exam:    Constitutional:  Well-developed and well-nourished.  No respiratory distress.   Sitting up in bed.  On room air with no apparent distress.    HENT:  Head: Normocephalic and atraumatic.  Mouth:  Moist mucous membranes.    Eyes:  Conjunctivae and EOM are normal.  No scleral icterus.  Neck:  Neck supple.  No JVD present.    Cardiovascular:  Normal rate, regular rhythm and normal heart sounds with no murmur. No edema.  Pulmonary/Chest:  No respiratory distress, no wheezes, no crackles, with normal breath sounds and good air movement.  Abdominal:  Soft.  Bowel sounds are normal.  No distension and no tenderness.   Musculoskeletal: Bilateral lower  extremity lymphedema.  Right lower extremity edema with mild erythema and warmth, pain with palpation.  Neurological:  Alert and oriented to person, place, and time.  No facial droop.  No slurred speech.   Skin:  Skin is warm and dry.  No rash noted.  No pallor. Bilateral lower extremity lymphedema.  Right lower extremity edema with mild erythema and warmth, pain with palpation.  Psychiatric:  Normal mood and affect.  Behavior is normal.      ----------------------------------------------------------------------------------------------------------------------  Results from last 7 days   Lab Units 11/10/22  1433   TROPONIN T ng/mL 0.016           Results from last 7 days   Lab Units 11/14/22  0821 11/13/22  0053 11/12/22  1015 11/12/22  0832 11/12/22  0433 11/11/22  1226 11/11/22  0648 11/11/22  0045 11/10/22  1800 11/10/22  1546 11/10/22  1433   CRP mg/dL  --   --  14.42*  --   --   --   --  17.21*  --   --  20.13*   LACTATE mmol/L  --   --   --  3.7*  --  3.9* 4.6* 5.0*   < >  --  5.0*   WBC 10*3/mm3 11.67* 12.66*  --   --  12.64*  --  9.70 10.93*  --   --  13.35*   HEMOGLOBIN g/dL 8.8* 9.1*  --   --  8.8*  --  8.7* 9.0*  --   --  9.5*   HEMATOCRIT % 26.4* 28.0*  --   --  26.5*  --  27.3* 28.1*  --   --  29.4*   MCV fL 96.0 96.9  --   --  96.4  --  101.5* 99.3*  --   --  97.7*   MCHC g/dL 33.3 32.5  --   --  33.2  --  31.9 32.0  --   --  32.3   PLATELETS 10*3/mm3 41* 45*  --   --  49*  --  43* 47*  --   --  56*   INR   --   --   --   --   --   --   --   --   --  1.12*  --     < > = values in this interval not displayed.     Results from last 7 days   Lab Units 11/14/22  0820 11/13/22  0053 11/12/22  0433 11/11/22  0648 11/11/22  0045 11/10/22  1433   SODIUM mmol/L 130* 133* 133* 130* 132* 133*   POTASSIUM mmol/L 3.8 4.0 4.4 5.6* 4.4 5.2   CHLORIDE mmol/L 97* 96* 97* 96* 95* 94*   CO2 mmol/L 24.5 25.0 24.8 18.2* 21.0* 25.9   BUN mg/dL 49* 57* 61* 69* 68* 67*   CREATININE mg/dL 2.50* 3.23* 3.65* 4.30* 4.53* 4.41*    CALCIUM mg/dL 8.1* 8.2* 8.3* 8.5* 8.8 9.1   GLUCOSE mg/dL 130* 107* 94 112* 125* 114*   ALBUMIN g/dL  --   --   --  1.58* 2.11* 2.68*   BILIRUBIN mg/dL  --   --   --  0.7 0.7 0.8   ALK PHOS U/L  --   --   --  101 116 134*   AST (SGOT) U/L  --   --   --  34* 25 22   ALT (SGPT) U/L  --   --   --  10 11 9   Estimated Creatinine Clearance: 61.1 mL/min (A) (by C-G formula based on SCr of 2.5 mg/dL (H)).  No results found for: AMMONIA    No results found for: HGBA1C, POCGLU  Lab Results   Component Value Date    HGBA1C 4.70 (L) 11/10/2022     Lab Results   Component Value Date    TSH 0.960 11/10/2022       Blood Culture   Date Value Ref Range Status   11/10/2022 No growth at 24 hours  Preliminary   11/10/2022 No growth at 24 hours  Preliminary     No results found for: URINECX  No results found for: WOUNDCX  No results found for: STOOLCX  No results found for: RESPCX  Pain Management Panel     Pain Management Panel Latest Ref Rng & Units 11/11/2022    AMPHETAMINES SCREEN, URINE Negative Negative    BARBITURATES SCREEN Negative Negative    BENZODIAZEPINE SCREEN, URINE Negative Negative    BUPRENORPHINEUR Negative Negative    COCAINE SCREEN, URINE Negative Negative    METHADONE SCREEN, URINE Negative Negative    METHAMPHETAMINEUR Negative Negative            ----------------------------------------------------------------------------------------------------------------------  Imaging Results (Last 24 Hours)     ** No results found for the last 24 hours. **          ----------------------------------------------------------------------------------------------------------------------    Pertinent Infectious Disease Results    Lactic acid 5.0 on admission.  Procalcitonin 0.87.  Urinalysis unremarkable.  COVID-19 and influenza PCR negative.  Blood cultures from 11/10/2022 show no growth thus far.  CT of the abdomen pelvis from 11/10/2022 reports no acute intra-abdominal abnormality, mildly nodular contour of the liver may  represent early cirrhosis, borderline splenomegaly, CT of the chest from 11/10/2022 reports no acute cardiopulmonary disease, bibasilar subsegmental atelectasis, small hiatal hernia.  Right lower extremity CT reports advanced cellulitis of the right lower extremity extending from the level of the proximal thigh through the foot with maximum thickness of the inflammatory change and stranding medially measuring greater than 15 cm at the level of the distal thigh and knee, no drainable fluid collection no subcutaneous gas, no focal erosive changes to suggest osteomyelitis at this time.      GI PCR negative.  Urinalysis from 11/11/2022 unremarkable.  Right lower extremity venous duplex negative for DVT.  Right lower extremity erythema improved but continues with significant edema.          Assessment/Plan       Assessment     Septic shock with lactic acid greater than 4 on admission  History of right knee pyogenic arthritis with MRSA with superimposed right knee chronic osteomyelitis  Right lower extremity cellulitis and lymphedema        Plan      I saw and examined the patient myself this morning with RUKHSANA Kennedy with whom I discussed the plan of care and primary RN and here are my findings:    Patient sitting up in bed this morning.  Reports continued right lower extremity pain.  Continues with right lower extremity significant lymphedema, erythema and warmth.  WBC improved at  11.67.  Based on previous culture with MRSA from joint aspiration at outside facility recommend to continue vancomycin monotherapy.  Patient will require prolonged course of antibiotic therapy for chronic osteomyelitis of the right knee secondary to complication from recurrent partially treated right knee septic arthritis and will need prolonged course of IV antibiotic therapy x8 weeks followed by oral suppressive therapy with very grim prognosis regarding clearing the infection without repetitive washout and aspiration.  Per  orthopedic surgery risk outweighs benefit for surgical intervention at this time.  Recommend Ace wrap to right lower extremity.    Code Status:   Code Status and Medical Interventions:   Ordered at: 11/10/22 1939     Level Of Support Discussed With:    Patient     Code Status (Patient has no pulse and is not breathing):    CPR (Attempt to Resuscitate)     Medical Interventions (Patient has pulse or is breathing):    Full Support       RUKHSANA Kennedy  11/14/22  10:59 EST     Electronically signed by RUKHSANA Kennedy, 11/14/22, 11:01 AM EST.  Electronically signed by Kane Winter MD, 11/14/22, 1:05 PM EST.

## 2022-11-14 NOTE — NURSING NOTE
Received consult for blisters to RLE.      RLE very edematous.  She has a blister to the medial and posterior proximal leg.  She has a small tear/ruptured blister to the knee fold.  No weeping noted at this time.  Will apply skin barrier daily.  Patient would likely benefit from lymphedema clinic as outpatient.    PI prevention orders are in place.

## 2022-11-15 ENCOUNTER — APPOINTMENT (OUTPATIENT)
Dept: ULTRASOUND IMAGING | Facility: HOSPITAL | Age: 57
End: 2022-11-15

## 2022-11-15 LAB
ANION GAP SERPL CALCULATED.3IONS-SCNC: 8.7 MMOL/L (ref 5–15)
BACTERIA SPEC AEROBE CULT: NORMAL
BACTERIA SPEC AEROBE CULT: NORMAL
BUN SERPL-MCNC: 44 MG/DL (ref 6–20)
BUN/CREAT SERPL: 19.5 (ref 7–25)
CALCIUM SPEC-SCNC: 7.6 MG/DL (ref 8.6–10.5)
CHLORIDE SERPL-SCNC: 97 MMOL/L (ref 98–107)
CO2 SERPL-SCNC: 24.3 MMOL/L (ref 22–29)
CREAT SERPL-MCNC: 2.26 MG/DL (ref 0.57–1)
DEPRECATED RDW RBC AUTO: 53.2 FL (ref 37–54)
EGFRCR SERPLBLD CKD-EPI 2021: 24.8 ML/MIN/1.73
EPO SERPL-ACNC: 17 MIU/ML (ref 2.6–18.5)
ERYTHROCYTE [DISTWIDTH] IN BLOOD BY AUTOMATED COUNT: 14.9 % (ref 12.3–15.4)
GLUCOSE SERPL-MCNC: 118 MG/DL (ref 65–99)
HCT VFR BLD AUTO: 25.8 % (ref 34–46.6)
HGB BLD-MCNC: 8.2 G/DL (ref 12–15.9)
MAGNESIUM SERPL-MCNC: 1.5 MG/DL (ref 1.6–2.6)
MCH RBC QN AUTO: 32.3 PG (ref 26.6–33)
MCHC RBC AUTO-ENTMCNC: 31.8 G/DL (ref 31.5–35.7)
MCV RBC AUTO: 101.6 FL (ref 79–97)
PLATELET # BLD AUTO: 32 10*3/MM3 (ref 140–450)
PMV BLD AUTO: 10 FL (ref 6–12)
POTASSIUM SERPL-SCNC: 3.8 MMOL/L (ref 3.5–5.2)
RBC # BLD AUTO: 2.54 10*6/MM3 (ref 3.77–5.28)
SODIUM SERPL-SCNC: 130 MMOL/L (ref 136–145)
WBC NRBC COR # BLD: 11.11 10*3/MM3 (ref 3.4–10.8)

## 2022-11-15 PROCEDURE — 99233 SBSQ HOSP IP/OBS HIGH 50: CPT | Performed by: STUDENT IN AN ORGANIZED HEALTH CARE EDUCATION/TRAINING PROGRAM

## 2022-11-15 PROCEDURE — 0 MAGNESIUM SULFATE 4 GM/100ML SOLUTION: Performed by: STUDENT IN AN ORGANIZED HEALTH CARE EDUCATION/TRAINING PROGRAM

## 2022-11-15 PROCEDURE — 25010000002 MORPHINE PER 10 MG: Performed by: HOSPITALIST

## 2022-11-15 PROCEDURE — 97166 OT EVAL MOD COMPLEX 45 MIN: CPT

## 2022-11-15 PROCEDURE — 83735 ASSAY OF MAGNESIUM: CPT | Performed by: STUDENT IN AN ORGANIZED HEALTH CARE EDUCATION/TRAINING PROGRAM

## 2022-11-15 PROCEDURE — 94799 UNLISTED PULMONARY SVC/PX: CPT

## 2022-11-15 PROCEDURE — 25010000002 HEPARIN (PORCINE) PER 1000 UNITS: Performed by: HOSPITALIST

## 2022-11-15 PROCEDURE — C1751 CATH, INF, PER/CENT/MIDLINE: HCPCS

## 2022-11-15 PROCEDURE — 25010000002 VANCOMYCIN 5 G RECONSTITUTED SOLUTION: Performed by: INTERNAL MEDICINE

## 2022-11-15 PROCEDURE — 99232 SBSQ HOSP IP/OBS MODERATE 35: CPT | Performed by: NURSE PRACTITIONER

## 2022-11-15 PROCEDURE — 80048 BASIC METABOLIC PNL TOTAL CA: CPT | Performed by: STUDENT IN AN ORGANIZED HEALTH CARE EDUCATION/TRAINING PROGRAM

## 2022-11-15 PROCEDURE — 85027 COMPLETE CBC AUTOMATED: CPT | Performed by: STUDENT IN AN ORGANIZED HEALTH CARE EDUCATION/TRAINING PROGRAM

## 2022-11-15 PROCEDURE — 02HV33Z INSERTION OF INFUSION DEVICE INTO SUPERIOR VENA CAVA, PERCUTANEOUS APPROACH: ICD-10-PCS | Performed by: STUDENT IN AN ORGANIZED HEALTH CARE EDUCATION/TRAINING PROGRAM

## 2022-11-15 RX ORDER — MAGNESIUM SULFATE HEPTAHYDRATE 40 MG/ML
4 INJECTION, SOLUTION INTRAVENOUS ONCE
Status: COMPLETED | OUTPATIENT
Start: 2022-11-15 | End: 2022-11-15

## 2022-11-15 RX ORDER — SODIUM CHLORIDE 0.9 % (FLUSH) 0.9 %
20 SYRINGE (ML) INJECTION AS NEEDED
Status: DISCONTINUED | OUTPATIENT
Start: 2022-11-15 | End: 2022-11-18 | Stop reason: HOSPADM

## 2022-11-15 RX ORDER — SODIUM CHLORIDE 0.9 % (FLUSH) 0.9 %
10 SYRINGE (ML) INJECTION AS NEEDED
Status: DISCONTINUED | OUTPATIENT
Start: 2022-11-15 | End: 2022-11-18 | Stop reason: HOSPADM

## 2022-11-15 RX ORDER — SODIUM CHLORIDE 0.9 % (FLUSH) 0.9 %
10 SYRINGE (ML) INJECTION EVERY 12 HOURS SCHEDULED
Status: DISCONTINUED | OUTPATIENT
Start: 2022-11-16 | End: 2022-11-18 | Stop reason: HOSPADM

## 2022-11-15 RX ORDER — GUAIFENESIN/DEXTROMETHORPHAN 100-10MG/5
5 SYRUP ORAL EVERY 4 HOURS PRN
Status: DISCONTINUED | OUTPATIENT
Start: 2022-11-15 | End: 2022-11-18 | Stop reason: HOSPADM

## 2022-11-15 RX ADMIN — MORPHINE SULFATE 2 MG: 2 INJECTION, SOLUTION INTRAMUSCULAR; INTRAVENOUS at 06:25

## 2022-11-15 RX ADMIN — ZINC OXIDE: 200 OINTMENT TOPICAL at 20:10

## 2022-11-15 RX ADMIN — FLUTICASONE PROPIONATE 2 SPRAY: 50 SPRAY, METERED NASAL at 09:22

## 2022-11-15 RX ADMIN — NYSTATIN 1 APPLICATION: 100000 CREAM TOPICAL at 09:22

## 2022-11-15 RX ADMIN — MORPHINE SULFATE 2 MG: 2 INJECTION, SOLUTION INTRAMUSCULAR; INTRAVENOUS at 02:35

## 2022-11-15 RX ADMIN — Medication 10 ML: at 20:09

## 2022-11-15 RX ADMIN — NYSTATIN 1 APPLICATION: 100000 CREAM TOPICAL at 20:10

## 2022-11-15 RX ADMIN — GUAIFENESIN 600 MG: 600 TABLET, EXTENDED RELEASE ORAL at 09:22

## 2022-11-15 RX ADMIN — VANCOMYCIN HYDROCHLORIDE 1250 MG: 5 INJECTION, POWDER, LYOPHILIZED, FOR SOLUTION INTRAVENOUS at 18:49

## 2022-11-15 RX ADMIN — PANTOPRAZOLE SODIUM 40 MG: 40 TABLET, DELAYED RELEASE ORAL at 06:23

## 2022-11-15 RX ADMIN — PHENOL 1 SPRAY: 1.4 SPRAY ORAL at 18:51

## 2022-11-15 RX ADMIN — OXYCODONE HYDROCHLORIDE 15 MG: 15 TABLET ORAL at 12:25

## 2022-11-15 RX ADMIN — Medication 500 MCG: at 09:22

## 2022-11-15 RX ADMIN — ZINC OXIDE: 200 OINTMENT TOPICAL at 09:22

## 2022-11-15 RX ADMIN — HEPARIN SODIUM 5000 UNITS: 5000 INJECTION INTRAVENOUS; SUBCUTANEOUS at 09:22

## 2022-11-15 RX ADMIN — HEPARIN SODIUM 5000 UNITS: 5000 INJECTION INTRAVENOUS; SUBCUTANEOUS at 20:09

## 2022-11-15 RX ADMIN — Medication 10 ML: at 20:10

## 2022-11-15 RX ADMIN — MORPHINE SULFATE 2 MG: 2 INJECTION, SOLUTION INTRAMUSCULAR; INTRAVENOUS at 14:39

## 2022-11-15 RX ADMIN — MAGNESIUM SULFATE HEPTAHYDRATE 4 G: 40 INJECTION, SOLUTION INTRAVENOUS at 17:35

## 2022-11-15 RX ADMIN — GUAIFENESIN AND DEXTROMETHORPHAN 5 ML: 100; 10 SYRUP ORAL at 15:30

## 2022-11-15 RX ADMIN — OXYCODONE HYDROCHLORIDE 15 MG: 15 TABLET ORAL at 18:49

## 2022-11-15 RX ADMIN — OXYCODONE HYDROCHLORIDE 15 MG: 15 TABLET ORAL at 09:22

## 2022-11-15 RX ADMIN — OXYCODONE HYDROCHLORIDE 15 MG: 15 TABLET ORAL at 20:10

## 2022-11-15 RX ADMIN — LEVOTHYROXINE SODIUM 100 MCG: 100 TABLET ORAL at 09:22

## 2022-11-15 NOTE — PROGRESS NOTES
PROGRESS NOTE         Patient Identification:  Name:  Malina Velasquez  Age:  57 y.o.  Sex:  female  :  1965  MRN:  [id number]  Visit Number:  [id number]  Primary Care Provider:  Jeremiah Topete MD         LOS: 5 days       ----------------------------------------------------------------------------------------------------------------------  Subjective       Chief Complaints:    Weakness - Generalized (Pt states dr sent her here for high wbc and kidney function )        Interval History:      Patient sitting up in bed this morning.  Reports continued right lower extremity pain, but edema and pain somewhat improved after application of Ace wrap yesterday.  Currently on room air with no apparent distress.  WBC stable 11.11.  Afebrile, denies diarrhea.    Review of Systems:    Constitutional: no fever, chills and night sweats.  Generalized fatigue.  Eyes: no eye drainage, itching or redness.  HEENT: no mouth sores, dysphagia or nose bleed.  Respiratory: no for shortness of breath, cough or production of sputum.  Cardiovascular: no chest pain, no palpitations, no orthopnea.  Gastrointestinal: no nausea, vomiting or diarrhea. No abdominal pain, hematemesis or rectal bleeding.  Genitourinary: no dysuria or polyuria.  Hematologic/lymphatic: no lymph node abnormalities, no easy bruising or easy bleeding.  Musculoskeletal: Right lower extremity pain.  Skin: No rash and no itching.  Neurological: no loss of consciousness, no seizure, no headache.  Behavioral/Psych: no depression or suicidal ideation.  Endocrine: no hot flashes.  Immunologic: negative.    ----------------------------------------------------------------------------------------------------------------------      Objective       [facility] Meds:  fluticasone, 2 spray, Nasal, Daily  folic acid, 500 mcg, Oral, Daily  guaiFENesin, 600 mg, Oral, Q12H  heparin (porcine), 5,000 Units, Subcutaneous, Q12H  levothyroxine, 100 mcg, Oral,  Daily  loperamide, 2 mg, Oral, Once  metoprolol tartrate, 12.5 mg, Oral, Q12H  nystatin, 1 application, Topical, BID  oxyCODONE, 15 mg, Oral, 4x Daily  pantoprazole, 40 mg, Oral, QAM  sodium chloride, 10 mL, Intravenous, Q12H  sodium chloride, 10 mL, Intravenous, Q12H  vancomycin, 1,250 mg, Intravenous, Q24H  zinc oxide, , Topical, BID         ----------------------------------------------------------------------------------------------------------------------    Vital Signs:  Temp:  [97.9 °F (36.6 °C)-99.6 °F (37.6 °C)] 97.9 °F (36.6 °C)  Heart Rate:  [] 103  Resp:  [12-22] 14  BP: (100-139)/() 107/58  Mean Arterial Pressure (Non-Invasive) for the past 24 hrs (Last 3 readings):   Noninvasive MAP (mmHg)   11/15/22 1003 69   11/15/22 0803 68   11/15/22 0703 109     SpO2 Percentage    11/15/22 0803 11/15/22 0903 11/15/22 1003   SpO2: 96% 95% 93%     SpO2:  [93 %-100 %] 93 %  on   ;   Device (Oxygen Therapy): room air    Body mass index is 91.23 kg/m².  Wt Readings from Last 3 Encounters:   11/15/22 (!) 288 kg (635 lb 12.9 oz)        Intake/Output Summary (Last 24 hours) at 11/15/2022 1020  Last data filed at 11/15/2022 0800  Gross per 24 hour   Intake 360 ml   Output 1700 ml   Net -1340 ml     Diet Soft to Chew (NDD 3); Ground Meat; Regular Consistency  ----------------------------------------------------------------------------------------------------------------------      Physical Exam:    Constitutional:  Well-developed and well-nourished.  No respiratory distress.   Sitting up in bed.  On room air with no apparent distress.    HENT:  Head: Normocephalic and atraumatic.  Mouth:  Moist mucous membranes.    Eyes:  Conjunctivae and EOM are normal.  No scleral icterus.  Neck:  Neck supple.  No JVD present.    Cardiovascular:  Normal rate, regular rhythm and normal heart sounds with no murmur. No edema.  Pulmonary/Chest:  No respiratory distress, no wheezes, no crackles, with normal breath sounds and good  air movement.  Abdominal:  Soft.  Bowel sounds are normal.  No distension and no tenderness.   Musculoskeletal: Bilateral lower extremity lymphedema.  Right lower extremity edema with mild erythema and warmth, pain with palpation, currently in ACE wrap.  Neurological:  Alert and oriented to person, place, and time.  No facial droop.  No slurred speech.   Skin:  Skin is warm and dry.  No rash noted.  No pallor. Bilateral lower extremity lymphedema.  Right lower extremity edema with mild erythema and warmth, pain with palpation, currently in ACE wrap.  Psychiatric:  Normal mood and affect.  Behavior is normal.      ----------------------------------------------------------------------------------------------------------------------  Results from last 7 days   Lab Units 11/10/22  1433   TROPONIN T ng/mL 0.016           Results from last 7 days   Lab Units 11/15/22  0019 11/14/22  0821 11/13/22  0053 11/12/22  1015 11/12/22  0832 11/12/22  0433 11/11/22  1226 11/11/22  0648 11/11/22  0045 11/10/22  1800 11/10/22  1546 11/10/22  1433   CRP mg/dL  --   --   --  14.42*  --   --   --   --  17.21*  --   --  20.13*   LACTATE mmol/L  --   --   --   --  3.7*  --  3.9* 4.6* 5.0*   < >  --  5.0*   WBC 10*3/mm3 11.11* 11.67* 12.66*  --   --    < >  --  9.70 10.93*  --   --  13.35*   HEMOGLOBIN g/dL 8.2* 8.8* 9.1*  --   --    < >  --  8.7* 9.0*  --   --  9.5*   HEMATOCRIT % 25.8* 26.4* 28.0*  --   --    < >  --  27.3* 28.1*  --   --  29.4*   MCV fL 101.6* 96.0 96.9  --   --    < >  --  101.5* 99.3*  --   --  97.7*   MCHC g/dL 31.8 33.3 32.5  --   --    < >  --  31.9 32.0  --   --  32.3   PLATELETS 10*3/mm3 32* 41* 45*  --   --    < >  --  43* 47*  --   --  56*   INR   --   --   --   --   --   --   --   --   --   --  1.12*  --     < > = values in this interval not displayed.     Results from last 7 days   Lab Units 11/15/22  0019 11/14/22  1435 11/14/22  0820 11/13/22  0053 11/12/22  0433 11/11/22  0648 11/11/22  0045 11/10/22  1433    SODIUM mmol/L 130*  --  130* 133*   < > 130* 132* 133*   POTASSIUM mmol/L 3.8  --  3.8 4.0   < > 5.6* 4.4 5.2   MAGNESIUM mg/dL 1.5* 1.5*  --   --   --   --   --   --    CHLORIDE mmol/L 97*  --  97* 96*   < > 96* 95* 94*   CO2 mmol/L 24.3  --  24.5 25.0   < > 18.2* 21.0* 25.9   BUN mg/dL 44*  --  49* 57*   < > 69* 68* 67*   CREATININE mg/dL 2.26*  --  2.50* 3.23*   < > 4.30* 4.53* 4.41*   CALCIUM mg/dL 7.6*  --  8.1* 8.2*   < > 8.5* 8.8 9.1   GLUCOSE mg/dL 118*  --  130* 107*   < > 112* 125* 114*   ALBUMIN g/dL  --   --   --   --   --  1.58* 2.11* 2.68*   BILIRUBIN mg/dL  --   --   --   --   --  0.7 0.7 0.8   ALK PHOS U/L  --   --   --   --   --  101 116 134*   AST (SGOT) U/L  --   --   --   --   --  34* 25 22   ALT (SGPT) U/L  --   --   --   --   --  10 11 9    < > = values in this interval not displayed.   Estimated Creatinine Clearance: 67.6 mL/min (A) (by C-G formula based on SCr of 2.26 mg/dL (H)).  No results found for: AMMONIA    No results found for: HGBA1C, POCGLU  Lab Results   Component Value Date    HGBA1C 4.70 (L) 11/10/2022     Lab Results   Component Value Date    TSH 0.960 11/10/2022       Blood Culture   Date Value Ref Range Status   11/10/2022 No growth at 24 hours  Preliminary   11/10/2022 No growth at 24 hours  Preliminary     No results found for: URINECX  No results found for: WOUNDCX  No results found for: STOOLCX  No results found for: RESPCX  Pain Management Panel     Pain Management Panel Latest Ref Rng & Units 11/11/2022    AMPHETAMINES SCREEN, URINE Negative Negative    BARBITURATES SCREEN Negative Negative    BENZODIAZEPINE SCREEN, URINE Negative Negative    BUPRENORPHINEUR Negative Negative    COCAINE SCREEN, URINE Negative Negative    METHADONE SCREEN, URINE Negative Negative    METHAMPHETAMINEUR Negative Negative            ----------------------------------------------------------------------------------------------------------------------  Imaging Results (Last 24 Hours)     ** No  results found for the last 24 hours. **          ----------------------------------------------------------------------------------------------------------------------    Pertinent Infectious Disease Results    Lactic acid 5.0 on admission.  Procalcitonin 0.87.  Urinalysis unremarkable.  COVID-19 and influenza PCR negative.  Blood cultures from 11/10/2022 show no growth thus far.  CT of the abdomen pelvis from 11/10/2022 reports no acute intra-abdominal abnormality, mildly nodular contour of the liver may represent early cirrhosis, borderline splenomegaly, CT of the chest from 11/10/2022 reports no acute cardiopulmonary disease, bibasilar subsegmental atelectasis, small hiatal hernia.  Right lower extremity CT reports advanced cellulitis of the right lower extremity extending from the level of the proximal thigh through the foot with maximum thickness of the inflammatory change and stranding medially measuring greater than 15 cm at the level of the distal thigh and knee, no drainable fluid collection no subcutaneous gas, no focal erosive changes to suggest osteomyelitis at this time.      GI PCR negative.  Urinalysis from 11/11/2022 unremarkable.  Right lower extremity venous duplex negative for DVT.  Right lower extremity erythema improved but continues with significant edema.          Assessment/Plan       Assessment     Septic shock with lactic acid greater than 4 on admission  History of right knee pyogenic arthritis with MRSA with superimposed right knee chronic osteomyelitis  Right lower extremity cellulitis and lymphedema        Plan        Patient sitting up in bed this morning.  Reports continued right lower extremity pain, but edema and pain somewhat improved after application of Ace wrap yesterday.  Currently on room air with no apparent distress.  WBC stable 11.11.  Afebrile, denies diarrhea.       Based on previous culture with MRSA from joint aspiration at outside facility recommend to continue  vancomycin monotherapy.  Patient will require prolonged course of antibiotic therapy for chronic osteomyelitis of the right knee secondary to complication from recurrent partially treated right knee septic arthritis and will need prolonged course of IV antibiotic therapy x8 weeks followed by oral suppressive therapy with very grim prognosis regarding clearing the infection without repetitive washout and aspiration.  Per orthopedic surgery risk outweighs benefit for surgical intervention at this time.  We will continue to follow closely and adjust antibiotic therapy as needed.    Code Status:   Code Status and Medical Interventions:   Ordered at: 11/10/22 1939     Level Of Support Discussed With:    Patient     Code Status (Patient has no pulse and is not breathing):    CPR (Attempt to Resuscitate)     Medical Interventions (Patient has pulse or is breathing):    Full Support       RUKHSANA Kennedy  11/15/22  10:20 EST     Electronically signed by RUKHSANA Kennedy, 11/15/22, 10:21 AM EST.

## 2022-11-15 NOTE — PROGRESS NOTES
[facility] HOSPITALIST PROGRESS NOTE     Patient Identification:  Name:  Malina Velasquez  Age:  57 y.o.  Sex:  female  :  1965  MRN:  [id number]  Visit Number:  [id number]  ROOM: [address]     Primary Care Provider:  Jeremiah Topete MD    Length of stay in inpatient status:  4    Subjective     Chief Compliant:    Chief Complaint   Patient presents with   • Weakness - Generalized     Pt states dr sent her here for high wbc and kidney function        History of Presenting Illness: Patient seen and evaluated in follow-up for severe sepsis secondary to right lower extremity cellulitis with history of MRSA right knee infection with pyogenic arthritis and SUZETTE on CKD.  Patient at time of exam this morning feeling well and denying any acute complaints other than requesting thing for mild diarrhea.    Objective     Current Hospital Meds:  fluticasone, 2 spray, Nasal, Daily  folic acid, 500 mcg, Oral, Daily  guaiFENesin, 600 mg, Oral, Q12H  heparin (porcine), 5,000 Units, Subcutaneous, Q12H  levothyroxine, 100 mcg, Oral, Daily  loperamide, 2 mg, Oral, Once  magnesium sulfate, 2 g, Intravenous, Once  metoprolol tartrate, 12.5 mg, Oral, Q12H  nystatin, 1 application, Topical, BID  oxyCODONE, 15 mg, Oral, 4x Daily  pantoprazole, 40 mg, Oral, QAM  sodium chloride, 10 mL, Intravenous, Q12H  sodium chloride, 10 mL, Intravenous, Q12H  vancomycin, 1,250 mg, Intravenous, Q24H  zinc oxide, , Topical, BID         ----------------------------------------------------------------------------------------------------------------------  Vital Signs:  Temp:  [97.9 °F (36.6 °C)-99.2 °F (37.3 °C)] 98.3 °F (36.8 °C)  Heart Rate:  [] 101  Resp:  [12-20] 20  BP: ()/() 114/63  SpO2:  [91 %-100 %] 97 %  on   ;   Device (Oxygen Therapy): room air  Body mass index is 91.23 kg/m².      Intake/Output Summary (Last 24 hours) at 2022 193  Last data filed at 2022 1600  Gross per 24 hour   Intake 240  ml   Output 2650 ml   Net -2410 ml      ----------------------------------------------------------------------------------------------------------------------  Physical exam:  Constitutional: Morbidly obese chronically ill-appearing female resting in bed, NAD  HENT:  Head:  Normocephalic and atraumatic.  Mouth:  Moist mucous membranes.    Eyes:  Conjunctivae and EOM are normal. No scleral icterus.    Cardiovascular:  Normal rate, regular rhythm and normal heart sounds with no murmur.  Pulmonary/Chest:  No respiratory distress, no wheezes, no crackles, with normal breath sounds and good air movement.  Abdominal:  Soft.  Bowel sounds are normal.  No distension and no tenderness.   Musculoskeletal: Tenderness to palpation of the right lower extremity with out deformity.  No red or swollen joints anywhere.  Functional ROM intact.   Neurological:  Alert and oriented to person, place, and time.  No cranial nerve deficit.  No tongue deviation.  No facial droop.  No slurred speech.   Skin:  Skin is warm and dry.  Mild erythema of the very distal end of the right lower extremity abutting the dorsum of the right foot with some erythema present there as well.  Peripheral vascular:  Pulses in all 4 extremities with no clubbing, no cyanosis, lateral extensive lymphedema present, right greater than left.  Psychiatric: Appropriate mood and affect, pleasant.   ----------------------------------------------------------------------------------------------------------------------  WBC/HGB/HCT/PLT   11.67/8.8/26.4/41 (11/14 0821)  BUN/CREAT/GLUC/ALT/AST/LISSETT/LIP    49/2.50/130/--/--/--/-- (11/14 0820)  LYTES - Na/K/Cl/CO2: 130*/3.8/97*/24.5 (11/14 0820)        No results found for: URINECX  Blood Culture   Date Value Ref Range Status   11/10/2022 No growth at 4 days  Preliminary   11/10/2022 No growth at 4 days  Preliminary       I have personally looked at the labs and they are summarized  above.  ----------------------------------------------------------------------------------------------------------------------  Detailed radiology reports for the last 24 hours:  No radiology results for the last day  Assessment & Plan      Severe sepsis  Septic shock per CMS guidelines  Lactic acidemia  Right lower extremity cellulitis and lymphedema  Right knee chronic osteomyelitis    -Patient seen and evaluated by orthopedic surgery who feels risk of surgery outweighs benefit for any intervention at this time.    -Infectious disease consulted and following along and recommending 8-week course of antibiotic therapy with vancomycin followed by oral suppressive therapy.    -Consult placed for PICC line placement for above prolonged antibiotic therapy.    -Resume Ace wrappings to lower extremities to help with lymphedema.    SUZETTE on CKD    -Creatinine 4.4 admission improved with supportive care and antibiotic therapy and fluids.  Improved to 2.5 today.    -Nephrology consulting on along and worried about possibility of HUS with her anemia and thrombocytopenia and request hematology consultation    -Hematology consulted and seen and evaluated patient and agrees low likelihood of any HUS or TTP given lack of evidence of hemolysis on peripheral smear as well as normal normal LDH and haptoglobin.  ADAMTS 13 ordered.    Chronic anemia  Chronic thrombocytopenia  Chronic liver cirrhosis  Chronic hepatitis C    -Patient with history of cirrhosis which would be consistent with patient's cytopenias.    -Closely, hematology consult in interventions and recommendations as above.    -Started on folate supplementation     Chronic HFpEF    -TTE with grade 1 diastolic dysfunction, mild LV hypertrophy and mild aortic valve stenosis.    Essential hypertension    -Continue home metoprolol    Hypothyroidism    -Continue home levothyroxine      Copied text in portions of the note has been reviewed and is accurate as of 11/14/22    VTE  Prophylaxis:   Mechanical Order History:     None      Pharmalogical Order History:      Ordered     Dose Route Frequency Stop    11/10/22 6246  heparin (porcine) 5000 UNIT/ML injection 5,000 Units         5,000 Units SC Every 12 Hours Scheduled --                Disposition likely home with IV antibiotic therapy.    Michael Roman DO  [facility]  11/14/22  19:37 EST

## 2022-11-15 NOTE — CASE MANAGEMENT/SOCIAL WORK
Continued Stay Note  ANA ROSA Donnelly     Patient Name: Malina Velasquez  MRN: [id number]  Today's Date: 11/15/2022    Admit Date: 11/10/2022       Discharge Plan     Row Name 11/15/22 1006       Plan    Plan CM spoke with Bijan Radford and faxed information for IV Vanc per ID recs. states Rk Radfordlando will verify copay amount. CM will notify pt of copay once verified. Will follow.    Patient/Family in Agreement with Plan yes    Row Name 11/15/22 0943       Plan    Plan     Patient/Family in Agreement with Plan                Discharge Codes    No documentation.               Expected Discharge Date and Time     Expected Discharge Date Expected Discharge Time    Nov 15, 2022             Tabby Sebastian RN

## 2022-11-15 NOTE — PLAN OF CARE
Goal Outcome Evaluation:           Progress: no change  Outcome Evaluation: Pt received picc line placement today, iv abx continued and iv mag replaced. She complains of intermittent pain in the right lower ext. She denies any chest pain or shortness of air. Pt denies any nausea this shift as well.

## 2022-11-15 NOTE — PLAN OF CARE
Goal Outcome Evaluation:              Outcome Evaluation: Requiring scheduled and prn pain medication. Snack and Sprite brought from cafeteria per patient request . V/s stable so far . Will monitor

## 2022-11-15 NOTE — CASE MANAGEMENT/SOCIAL WORK
Continued Stay Note  ANA ROSA Donnelly     Patient Name: Malina Velasquez  MRN: [id number]  Today's Date: 11/15/2022    Admit Date: 11/10/2022       Discharge Plan     Row Name 11/15/22 1222       Plan    Plan Per Edvin, [facility], patient will not have a copay for home IV abx. Patient notified. Voiced understanding. PICC has been ordered. CM will follow and assist with IV abx pending further orders. SS will arrange HH. Plans home at [place].    Patient/Family in Agreement with Plan yes    Row Name 11/15/22 1006       Plan    Plan     Patient/Family in Agreement with Plan                Discharge Codes    No documentation.               Expected Discharge Date and Time     Expected Discharge Date Expected Discharge Time    Nov 15, 2022             Tabby Sebastian RN

## 2022-11-15 NOTE — CONSULTS
Assessment:  Diagnosis: LTAB    Allergies   Allergen Reactions   • Sulfa Antibiotics Rash   • Nsaids Swelling   • Penicillins Rash       Order Date/Time: 11/15/22  Indications: Long Term Antibiotics   LABS:  Lab Results   Component Value Date    INR 1.12 (H) 11/10/2022    PROTIME 14.7 11/10/2022     Lab Results   Component Value Date    PTT 33.4 11/10/2022     Lab Results   Component Value Date    WBC 11.11 (H) 11/15/2022    HGB 8.2 (L) 11/15/2022    HCT 25.8 (L) 11/15/2022    .6 (H) 11/15/2022    PLT 32 (C) 11/15/2022     Lab Results   Component Value Date    BUN 44 (H) 11/15/2022     Lab Results   Component Value Date    CREATININE 2.26 (H) 11/15/2022     No results found for: EGFRIFNONA, EGFRIFAFRI  Labs Reviewed: WNL    Contraindications for PICC/Midline:  Patient being seen by Nephrology      Recommendations:  Peripherally inserted central catheter    Procedure Time Out:  Time out Time: 1515  Correct Patient Identity: Yes  Correct Surgical Side and Site Are Marked: Yes  Agreement on Procedure to be done: Yes  Antibiotic Given: N/A  RN: justice smith RN  RN: ciro Marino RN  Other: N/A      Justice Smith, RN

## 2022-11-15 NOTE — DISCHARGE PLACEMENT REQUEST
"Malina Velasquez (57 y.o. Female)     Date of Birth   1965    Social Security Number       Address   [address] [facility] [address]    Home Phone   [phone]    MRN   [id number]       Nondenominational   None    Marital Status                               Admission Date   11/10/22    Admission Type   Emergency    Admitting Provider   Miguel Licea MD    Attending Provider   Michael Roman DO    Department, Room/Bed   [facility], P207/1P       Discharge Date       Discharge Disposition       Discharge Destination                               Attending Provider: Michael Roman DO    Allergies: Sulfa Antibiotics, Nsaids, Penicillins    Isolation: None   Infection: None   Code Status: CPR    Ht: 177.8 cm (70\")   Wt: 288 kg (635 lb 12.9 oz)    Admission Cmt: None   Principal Problem: Acute on chronic renal failure (HCC) [N17.9,N18.9]                 Active Insurance as of 11/10/2022     Primary Coverage     Payor Plan Insurance Group Employer/Plan Group    [place] [id number]     Payor Plan Address Payor Plan Phone Number Payor Plan Fax Number Effective Dates    [employer] [address] [address] [phone]  1/1/2021 - None Entered    [facility] [id number]       Subscriber Name Subscriber Birth Date Member ID       MALINA VELASQUEZ 1965 [id number]                 Emergency Contacts      (Rel.) Home Phone Work Phone Mobile Phone    Ant Velasquez (Spouse) [phone] -- --    GLADIS HODGE (Daughter) [phone] -- --               History & Physical      Maria Isabel Draper PA-C at 11/10/22 2009     Attestation signed by Miguel Licea MD at 11/10/22 8354 (Updated)    I have seen and examined the patient independently from Maria Isabel Draper PA-C, and have reviewed this documentation and agree. Continue broad spectrum IV antibiotics to cover what appears to be right foot cellulitis superimposed on severe chronic " lymphedema, along with concern for untreated pyogenic arthritis of right knee that was diagnosed per joint aspiration in 2022 as documented in Maria Isabel's note. Await further recommendations from ID and orthopedic surgery. Appreciate nephrology's recommendations regarding IV fluids. Will monitor closely for signs/symptoms of developing fluid overload in light of patient's history of CHF. Extent of CHF unknown; will follow up on ECHO ordered for tomorrow morning. Regarding patient's report of exposure to grandchildren with flu, she clarified to me that her grandchildren had cold and flu-like symptoms but tested negative for flu A and B. Respiratory PCR panel has been ordered to rule out other infectious causes, result is still pending.                       [facility]  History & Physical    Patient Identification:  Name:  Malina Velasquez  Age:  57 y.o.  Sex:  female  :  1965  MRN:  [id number]   Visit Number:  [id number]  Primary Care Physician:  Jeremiah Topete MD    Subjective     11/10/2022   Chief complaint:   Chief Complaint   Patient presents with   • Weakness - Generalized     Pt states dr sent her here for high wbc and kidney function      History of presenting illness:      Malina Velasquez is a 57 y.o. female with past medical history significant for CHF, essential hypertension, history of MVP, COPD, chronic hepatitis C, and former tobacco abuse who presents to [facility] emergency department with complaints of generalized weakness.     Patient states that she seen her PCP on Monday for annual blood work and was called back this morning due to her labs being abnormal. Specifically she was told that her creatinine was 4 and her WBC was 23K. She states that she lives with her grandchildren who was recently diagnosed with the Flu. She reports that approximately 10 days ago she developed symptoms similar to what they were experiencing. She states  "that she developed diarrhea and used \"3 boxes of Imodium over 1 weekend.\" She states that the diarrhea is now resolved and she had a normal bowel movement on Monday. She reports that she also had some nausea and vomiting which she attributes to not having good oral intake and continuing to take her home medications on an empty stomach. However, Tuesday night she developed a cough that is productive with green-tinged sputum. She also complains of congestion, nasal drainage, and sore throat. She did take 4 doses of left-over doxycycline and a dose of Suvextro to \"clear up her infection.\" She does report chills, but denies any fevers. She denies any abdominal pain. She denies any chest pain. She does report sediment in her urine, but denies any urinary symptoms.    Patient also explains that she has had worsening swelling of her right lower extremity over the past month. She can hardly tolerate the right knee or her right lower extremity to be touched or moved during exam. She states 3 years ago she had spontaneous MRSA infection of her right knee in which she had an arthroscopic knee joint washout performed in [place] and was treated with an extended course of antibiotics. Per chart review, she was seen by [facility] 07/2022 who performed joint aspiration and was diagnosed with pyogenic arthritis of right knee due to unspecified organism. Patient states that she does not remember completing a course of antibiotics after the aspiration of the joint was performed.     Upon arrival to the ED, vital signs were temperature 98.7, heart rate 95, respirations 16, /76, SpO2 95% on room air. CMP with glucose 114, sodium 133, chloride 94, creatinine 4.41, BUN 67, eGFR 11.1, alkaline phosphatase 134, albumin 2.68, otherwise within normal limits. CBC with WBC 13.35, RBC 3.01, hemoglobin 9.5, hematocrit 29.4, MCV 97.7, platelets 56, otherwise unremarkable. C-RP 20.13. Lactate 5.0. Procalcitonin 1.13. " Hemoglobin A1c 4.7. TSH 0.960. Pending peripheral blood cultures x 2. Chest XR with cardiomegaly and pulmonary vascular congestion and right basilar airspace disease. CT abdomen/pelvis with no acute intra-abdominal abnormality, mildly nodular contour of the liver which may reflect early cirrhosis, and borderline splenomegaly. CT chest with no acute cardiopulmonary disease, small calcified pulmonary nodule of left upper lobe likely pulmonary granuloma, bibasilar subsegmental atelectasis, small hiatal hernia.     Known Emergency Department medications received prior to my evaluation included IV Vancomycin, IV Merrem, IV Dilaudid 1 mg, IV Zofran 4 mg, sepsis bolus (2055 mL). Emergency Department Room location at the time of my evaluation was 406.     Patient will be admitted to the PCU for further evaluation and monitoring.     ---------------------------------------------------------------------------------------------------------------------   Review of Systems   Constitutional: Positive for appetite change (decreased) and chills. Negative for activity change and fever.   HENT: Positive for congestion, rhinorrhea and sore throat.    Eyes: Negative for discharge and redness.   Respiratory: Positive for cough (productive with green tinged sputum). Negative for apnea, shortness of breath and wheezing.    Cardiovascular: Positive for leg swelling (right lower extremity). Negative for chest pain and palpitations.   Gastrointestinal: Positive for diarrhea (reports having normal bowel movement on Monday), nausea and vomiting. Negative for abdominal distention and abdominal pain.   Genitourinary: Negative for difficulty urinating, dysuria, frequency and urgency.        Reports sediment in urine   Musculoskeletal: Positive for arthralgias (right knee) and joint swelling (right knee).   Skin: Negative for color change and wound.   Neurological: Positive for weakness (generalized). Negative for dizziness and light-headedness.    Psychiatric/Behavioral: Negative for agitation, behavioral problems and confusion.        ---------------------------------------------------------------------------------------------------------------------   Past Medical History:   Diagnosis Date   • CHF (congestive heart failure) ([facility])    • COPD (chronic obstructive pulmonary disease) ([facility])    • Edema    • Hep C w/o coma, chronic ([facility])    • Hypertension    • Mitral valve prolapse      Past Surgical History:   Procedure Laterality Date   • GASTRIC BANDING     • JOINT ASPIRATION AND/OR INJECTION Right 07/2022     Family History   Problem Relation Age of Onset   • Hypertension Mother    • Breast cancer Mother    • COPD Father      Social History     Socioeconomic History   • Marital status:    Tobacco Use   • Smoking status: Never   Vaping Use   • Vaping Use: Never used   Substance and Sexual Activity   • Alcohol use: Not Currently   • Drug use: Never   • Sexual activity: Defer     ---------------------------------------------------------------------------------------------------------------------   Allergies:  Sulfa antibiotics and Penicillins  ---------------------------------------------------------------------------------------------------------------------   Home medications:    Medications below are reported home medications pulling from within the system; at this time, these medications have not been reconciled unless otherwise specified and are in the verification process for further verifcation as current home medications.  Medications Prior to Admission   Medication Sig Dispense Refill Last Dose   • bumetanide (BUMEX) 2 MG tablet Take 1 tablet by mouth Daily.      • hydrALAZINE (APRESOLINE) 10 MG tablet Take 1 tablet by mouth 3 (Three) Times a Day.      • levothyroxine (SYNTHROID, LEVOTHROID) 25 MCG tablet Take 1 tablet by mouth Daily.      • metoprolol succinate XL (TOPROL-XL) 25 MG 24 hr tablet Take 1 tablet by mouth Daily.      •  oxyCODONE-acetaminophen (PERCOCET)  MG per tablet Take 1 tablet by mouth Every 6 (Six) Hours As Needed for Moderate Pain.      • traZODone (DESYREL) 50 MG tablet Take 1 tablet by mouth Every Night.          Hospital Scheduled Meds:  heparin (porcine), 5,000 Units, Subcutaneous, Q12H  [START ON 11/11/2022] meropenem, 1 g, Intravenous, Q12H  sodium chloride, 10 mL, Intravenous, Q12H  [START ON 11/11/2022] vancomycin, 500 mg, Intravenous, Q24H      sodium chloride, 125 mL/hr, Last Rate: 125 mL/hr (11/10/22 2008)        Current listed hospital scheduled medications may not yet reflect those currently placed in orders that are signed and held awaiting patient's arrival to floor.   ---------------------------------------------------------------------------------------------------------------------     Objective     Vital Signs:  Temp:  [98.7 °F (37.1 °C)] 98.7 °F (37.1 °C)  Heart Rate:  [95] 95  Resp:  [16] 16  BP: (120-131)/(76-89) 120/89      11/10/22  1340   Weight: (!) 181 kg (399 lb)     Body mass index is 57.25 kg/m².  ---------------------------------------------------------------------------------------------------------------------       Physical Exam  Constitutional:       General: She is awake.      Appearance: She is obese. She is ill-appearing (disheveled).      Comments: Patient resting in bed. Appears in no acute distress.    HENT:      Head: Normocephalic and atraumatic.      Right Ear: External ear normal.      Left Ear: External ear normal.      Nose: Nose normal.      Mouth/Throat:      Mouth: Mucous membranes are moist.      Pharynx: Oropharynx is clear.   Eyes:      Extraocular Movements: Extraocular movements intact.      Conjunctiva/sclera: Conjunctivae normal.      Pupils: Pupils are equal, round, and reactive to light.   Cardiovascular:      Rate and Rhythm: Normal rate and regular rhythm.      Pulses: Normal pulses.           Dorsalis pedis pulses are 2+ on the right side and 2+ on the left  side.        Posterior tibial pulses are 2+ on the right side and 2+ on the left side.      Heart sounds: Murmur (Left upper sternal border) heard.   Pulmonary:      Effort: Pulmonary effort is normal. No accessory muscle usage or respiratory distress.      Breath sounds: Normal breath sounds. No decreased breath sounds, wheezing, rhonchi or rales.      Comments: Difficult exam due to body habitus. No obvious wheezes, crackles, or rhonchi on auscultation.  Abdominal:      General: Abdomen is flat. Bowel sounds are normal. There is no distension.      Palpations: Abdomen is soft.      Tenderness: There is no abdominal tenderness. There is no guarding.   Musculoskeletal:         General: Swelling (right lower extremity) and tenderness (right lower extremity) present.      Cervical back: Normal range of motion and neck supple.      Right lower leg: Edema present.      Left lower leg: No edema.      Comments: ROM of right lower limited due to pain.    Skin:     General: Skin is warm and dry.      Findings: Erythema present.      Comments: Right foot with slight erythema on dorsal surface. Increased edema and warmth noted of RLE and right knee compared to LLE. No obvious open wounds or drainage. Difficult to assess posterior aspect of leg due to patient complaining of pain.     Not able to assess back or gluteal area due to patient refusing to turn. Will attempt to assess when patient arrives to floor and able to get assistance to turn patient.    Neurological:      General: No focal deficit present.      Mental Status: She is alert and oriented to person, place, and time. Mental status is at baseline.   Psychiatric:         Mood and Affect: Mood normal.         Behavior: Behavior normal. Behavior is cooperative.         Thought Content: Thought content normal.         ---------------------------------------------------------------------------------------------------------------------  EKG:    Pending cardiology read.  Per my review, EKG reveals NSR with HR 89 and QTc 476 ms without acute ischemic changes.         I have personally looked at both the EKG and the telemetry strips.  ---------------------------------------------------------------------------------------------------------------------   Results from last 7 days   Lab Units 11/10/22  1800 11/10/22  1546 11/10/22  1433   CRP mg/dL  --   --  20.13*   LACTATE mmol/L 4.2*  --  5.0*   WBC 10*3/mm3  --   --  13.35*   HEMOGLOBIN g/dL  --   --  9.5*   HEMATOCRIT %  --   --  29.4*   MCV fL  --   --  97.7*   MCHC g/dL  --   --  32.3   PLATELETS 10*3/mm3  --   --  56*   INR   --  1.12*  --          Results from last 7 days   Lab Units 11/10/22  1433   SODIUM mmol/L 133*   POTASSIUM mmol/L 5.2   CHLORIDE mmol/L 94*   CO2 mmol/L 25.9   BUN mg/dL 67*   CREATININE mg/dL 4.41*   CALCIUM mg/dL 9.1   GLUCOSE mg/dL 114*   ALBUMIN g/dL 2.68*   BILIRUBIN mg/dL 0.8   ALK PHOS U/L 134*   AST (SGOT) U/L 22   ALT (SGPT) U/L 9   Estimated Creatinine Clearance: 25.3 mL/min (A) (by C-G formula based on SCr of 4.41 mg/dL (H)).  No results found for: AMMONIA  Results from last 7 days   Lab Units 11/10/22  1433   TROPONIN T ng/mL 0.016         Lab Results   Component Value Date    HGBA1C 4.70 (L) 11/10/2022     Lab Results   Component Value Date    TSH 0.960 11/10/2022     No results found for: PREGTESTUR, PREGSERUM, HCG, HCGQUANT  Pain Management Panel    There is no flowsheet data to display.           ---------------------------------------------------------------------------------------------------------------------  Imaging Results (Last 7 Days)     Procedure Component Value Units Date/Time    CT Lower Extremity Right Without Contrast [[id number]] Resulted: 11/10/22 2229     Updated: 11/10/22 2246    CT Chest Without Contrast Diagnostic [[id number]] Collected: 11/10/22 1720     Updated: 11/10/22 1723    Narrative:      CT Chest WO    CLINICAL HISTORY: soa.    COMPARISON: None.    TECHNIQUE: CT  chest without contrast. Coronal and sagittal reconstructions were obtained.  Radiation dose reduction techniques included automated exposure control or exposure modulation based on body size. Count of known CT and cardiac nuc med studies  performed in previous 12 months: 0.     FINDINGS:    Lungs: Bibasilar subsegmental atelectasis. No focal consolidation or mass. Small calcified pulmonary nodule in left upper lobe, likely pulmonary granuloma.    Pleura: No pleural effusions.    Cardiovascular: Normal heart size. No pericardial effusion. Normal course and caliber of the thoracic aorta.    Mediastinum: Evaluation is limited by lack of intravenous contrast. Small calcified hilar mediastinal lymph nodes.    Osseous: No acute osseous abnormality. Degenerative changes of the thoracic spine and dextroconvex curvature of the thoracic spine.    Other: None.    Upper abdomen: Small hiatal hernia.       Impression:        1.  No acute cardiopulmonary disease.   2.  Bibasilar subsegmental atelectasis.  3.  Small hiatal hernia.    Signer Name: Mark Bird MD   Signed: 11/10/2022 5:20 PM   Workstation Name: [id number]    [facility]    CT Abdomen Pelvis Without Contrast [[id number]] Collected: 11/10/22 1717     Updated: 11/10/22 1719    Narrative:      CT Abdomen Pelvis WO    INDICATION:   abd pain    TECHNIQUE:   CT of the abdomen and pelvis without IV contrast. Coronal and sagittal reconstructions were obtained.  Radiation dose reduction techniques included automated exposure control or exposure modulation based on body size. Count of known CT and cardiac nuc  med studies performed in previous 12 months: 0.     COMPARISON:   None available.    FINDINGS:  Evaluation of abdominal viscera is limited due to lack of intravenous contrast. Study is also degraded by patient body habitus.    Lower chest: Unremarkable.  Liver: Mildly nodular in contour. No solid mass.  Gallbladder and bile ducts: Surgically absent  gallbladder. No biliary dilatation.  Spleen: Borderline enlarged with scattered punctate calcifications, likely sequelae of old granulomatous disease.  Pancreas: Diffusely atrophic.  Adrenals: Unremarkable.  Kidneys and ureters: No renal or ureteral stones. No hydronephrosis.  Bowel: Postsurgical changes of the stomach. Nondilated bowel with no wall thickening.Appendix not visualized, but no inflammatory changes present in the right lower quadrant.  Bladder: Underdistended.  Reproductive organs: Normal uterus. No adnexal masses.  Lymph nodes: Unremarkable.  Peritoneum: Unremarkable.  Vessels: Prominent portosystemic collateral vessels.  Abdominal wall: Unremarkable.  Bones: Multilevel degenerative changes of the lumbar spine. Levoconvex curvature of the lumbar spine..      Impression:        1.  Exam is limited by lack of intravenous contrast and patient body habitus.  2.  No acute intra-abdominal abnormality.  3.  Mildly nodular contour of the liver which may reflect early cirrhosis. Recommend correlation with liver function tests.  4.  Borderline splenomegaly.    Signer Name: Mark Bird MD   Signed: 11/10/2022 5:17 PM   Workstation Name: [id number]    [facility]    XR Chest 1 View [[id number]] Collected: 11/10/22 1615     Updated: 11/10/22 1617    Narrative:      EXAM:    XR Chest, 1 View     EXAM DATE:    11/10/2022 3:13 PM     CLINICAL HISTORY:    cough     TECHNIQUE:    Frontal view of the chest.     COMPARISON:    No relevant prior studies available.     FINDINGS:    Lungs:  Pulmonary vascular congestion.  Right basilar airspace  disease.    Pleural space:  Unremarkable.  No pneumothorax.    Heart:  Marked cardiomegaly.    Mediastinum:  Unremarkable.    Bones/joints:  Unremarkable.       Impression:      1.  Cardiomegaly and pulmonary vascular congestion.  2.  Right basilar airspace disease.     This report was finalized on 11/10/2022 4:15 PM by Dr. Carlos Manuel King MD.           I have  personally reviewed the above radiology images and read the final radiology report on 11/10/22  ---------------------------------------------------------------------------------------------------------------------  Assessment / Plan     Active Hospital Problems    Diagnosis  POA   • **Acute on chronic renal failure (HCC) [N17.9, N18.9]  Yes       ASSESSMENT/PLAN:  -Severe sepsis criteria 2/2 undetermined etiology at present, POA  -Cellulitis of right foot and possible recurrent versus persistent septic arthritis of right knee joint , POA  -History of MRSA infection of right knee joint s/p washout  -History of pyogenic arthritis of right knee joint 07/2022  -Reported recent nausea, vomiting, and diarrhea  -Meets severe sepsis criteria with HR >90, C-RP 20.13, WBC 13.35, lactate 5.0, and procalcitonin 1.13.   -Urinalysis with no evidence of acute UTI.   -No acute intra-abdominal findings on CT abdomen/pelvis.  -CT chest with bibasilar atelectasis, but no evidence of opacities or consolidations to suggest pneumonia.  -Reports history of spontaneous MRSA infection of right knee joint requiring arthroscopic washout at [place] and treated with extended course of antibiotics. Seen by [facility] 07/2022 to have joint aspiration performed and diagnosed with pyogenic arthritis of right knee; patient denies completing course of antibiotics.   -Received IV Vancomycin and IV Merrem in ED; will continue on admission while awaiting culture results.   -Received sepsis bolus (2055 mL) in ED; continuous IV fluids ordered.   -Infectious disease and orthopedic surgery consults ordered, input and assistance much appreciated.   -Will monitor off imodium and if diarrhea reoccurs may consider further work-up for C. Diff.  -Obtain venous doppler of RLE to r/o DVT.    -Obtain respiratory panel PCR.  -Repeat a.m. CBC and C-RP.Trend lactate until normalized. Follow-up on blood cultures.     -Acute on chronic kidney  disease, POA  -Creatinine upon arrival 4.41; per verbal report from ED, creatinine preivously 1.34.   -ED provider discussed with on-call nephrology recommending NS @ 125 mL/hr after sepsis bolus.  -Formal inpatient nephrology consult ordered, input and assistance much appreciated.   -No evidence of obstructive uropathy on CT abdomen/pelvis.   -Avoid nephrotoxins as able; will hold home Bumex.   -Repeat a.m. CMP.     -Documented history of CHF  -No previous echo on file; obtain a.m. TTE.     -Essential hypertensin  -BP currently stable.   -Monitor per hospital protocol.   -Review home medications once reconciled.     -Macrocytic anemia  -Thrombocytopenia  -Possible 2/2 SUZETTE versus liver cirrhosis.   -Baseline unknown.   -Obtain iron panel, vitamin B12, and folate levels.   -Obtain peripheral blood smear.   -Repeat a.m. CBC.     -Liver cirrhosis   -Chronic hepatitis C  -Hypoalbuminemia  -Liver enzymes normal.   -May benefit from referral to hepatology/GI at discharge.  -Repeat a.m. CMP.     -Hypothyoridism  -TSH normal.   -Resume home Synthroid.     -Morbid obesity, BMI 57.25 kg/m2  -S/P gastric sleeve 2019  -Complicates all aspects of care  -------------------------------  F/E/N: Continuous NS @ 125 mL/hr. Replace electrolytes as needed. Regular diet.   DVT prophylaxis: SQ Heparin   Activity: Up with assistance; fall precautions  -------------------------------  CODE STATUS: Full    High risk secondary to sepsis of undetermined etiology at present, cellulitis of RLE, possible recurrent vs persistent septic arthritis of right knee joint, acute on CKD  -------------------------------  Expected length of stay:  INPATIENT status due to the need for care which can only be reasonably provided in an hospital setting such as aggressive/expedited ancillary services and/or consultation services, the necessity for IV medications, close physician monitoring and/or the possible need for procedures.  In such, I feel patient's risk  for adverse outcomes and need for care warrant INPATIENT evaluation and predict the patient's care encounter to likely last beyond 2 midnights.     Disposition: Pending clinical course    Maria Isabel Draper PA-C  11/10/22  22:50 EST    ---------------------------------------------------------------------------------------------------------------------           Electronically signed by Miguel Licea MD at 11/10/22 7749         Current Facility-Administered Medications   Medication Dose Route Frequency Provider Last Rate Last Admin   • cetirizine (zyrTEC) tablet 10 mg  10 mg Oral BID PRN Ann Marie Ochoa DO       • diphenhydrAMINE (BENADRYL) capsule 25 mg  25 mg Oral Nightly PRN Ann Marie Ochoa DO   25 mg at 11/14/22 1734   • docusate sodium (COLACE) capsule 100 mg  100 mg Oral BID PRN Ann Marie Ochoa DO       • fluticasone (FLONASE) 50 MCG/ACT nasal spray 2 spray  2 spray Nasal Daily Ann Marie Ochoa DO   2 spray at 11/15/22 0922   • folic acid (FOLVITE) tablet 500 mcg  500 mcg Oral Daily Farhana Hampton MD   500 mcg at 11/15/22 0922   • guaiFENesin (MUCINEX) 12 hr tablet 600 mg  600 mg Oral Q12H Michael Roman DO   600 mg at 11/15/22 0922   • heparin (porcine) 5000 UNIT/ML injection 5,000 Units  5,000 Units Subcutaneous Q12H Miguel Licea MD   5,000 Units at 11/15/22 0922   • hydrOXYzine (ATARAX) tablet 50 mg  50 mg Oral TID PRN Ann Marie Ochoa DO       • levothyroxine (SYNTHROID, LEVOTHROID) tablet 100 mcg  100 mcg Oral Daily Ann Marie Ochoa DO   100 mcg at 11/15/22 0922   • loperamide (IMODIUM) capsule 2 mg  2 mg Oral Once Michael Roman DO       • metoprolol tartrate (LOPRESSOR) tablet 12.5 mg  12.5 mg Oral Q12H Ann Marie Ochoa DO   12.5 mg at 11/14/22 1948   • morphine injection 2 mg  2 mg Intravenous Q4H PRN Miguel Licea MD   2 mg at 11/15/22 0625   • nitroglycerin (NITROSTAT) SL tablet 0.4 mg  0.4 mg Sublingual Q5 Min PRN Miguel Licea MD       • nystatin (MYCOSTATIN)  408121 UNIT/GM cream 1 application  1 application Topical BID Ann Marie Ochoa DO   1 application at 11/15/22 0922   • nystatin (MYCOSTATIN) powder 1 application  1 application Topical BID PRN Ann Marie Ochoa DO       • oxyCODONE (ROXICODONE) immediate release tablet 15 mg  15 mg Oral 4x Daily Ann Marie Ochoa DO   15 mg at 11/15/22 0922   • oxyCODONE (ROXICODONE) immediate release tablet 5 mg  5 mg Oral BID PRN Ann Marie Ochoa DO   5 mg at 11/14/22 1548   • pantoprazole (PROTONIX) EC tablet 40 mg  40 mg Oral QAM Ann Marie Ochoa DO   40 mg at 11/15/22 0623   • sodium chloride 0.9 % flush 10 mL  10 mL Intravenous PRN Miguel Licea MD       • sodium chloride 0.9 % flush 10 mL  10 mL Intravenous Q12H Miguel Licea MD   10 mL at 11/14/22 1952   • sodium chloride 0.9 % flush 10 mL  10 mL Intravenous PRN Miguel Licea MD       • sodium chloride 0.9 % flush 10 mL  10 mL Intravenous Q12H Ann Marie Ochoa DO   10 mL at 11/14/22 1953   • sodium chloride 0.9 % flush 10 mL  10 mL Intravenous PRN Ann Marie Ochoa DO       • traZODone (DESYREL) tablet 50 mg  50 mg Oral Nightly PRN Ann Marie Ochoa DO       • vancomycin 1250 mg/250 mL 0.9% NS IVPB ([facility])  1,250 mg Intravenous Q24H Kane Winter MD   1,250 mg at 11/14/22 1754   • zinc oxide 20 % ointment   Topical BID Ann Marie Ochoa DO   Given at 11/15/22 0922     Lab Results (last 48 hours)     Procedure Component Value Units Date/Time    Basic Metabolic Panel [[id number]]  (Abnormal) Collected: 11/15/22 0019    Specimen: Blood Updated: 11/15/22 0119     Glucose 118 mg/dL      BUN 44 mg/dL      Creatinine 2.26 mg/dL      Sodium 130 mmol/L      Potassium 3.8 mmol/L      Chloride 97 mmol/L      CO2 24.3 mmol/L      Calcium 7.6 mg/dL      BUN/Creatinine Ratio 19.5     Anion Gap 8.7 mmol/L      eGFR 24.8 mL/min/1.73      Comment: National Kidney Foundation and American Society of Nephrology (ASN) Task Force recommended calculation based on the Chronic  Kidney Disease Epidemiology Collaboration (CKD-EPI) equation refit without adjustment for race.       Narrative:      GFR Normal >60  Chronic Kidney Disease <60  Kidney Failure <15      Magnesium [[id number]]  (Abnormal) Collected: 11/15/22 0019    Specimen: Blood Updated: 11/15/22 0119     Magnesium 1.5 mg/dL     CBC (No Diff) [[id number]]  (Abnormal) Collected: 11/15/22 0019    Specimen: Blood Updated: 11/15/22 0053     WBC 11.11 10*3/mm3      RBC 2.54 10*6/mm3      Hemoglobin 8.2 g/dL      Hematocrit 25.8 %      .6 fL      MCH 32.3 pg      MCHC 31.8 g/dL      RDW 14.9 %      RDW-SD 53.2 fl      MPV 10.0 fL      Platelets 32 10*3/mm3     Magnesium [[id number]]  (Abnormal) Collected: 22 1435    Specimen: Blood Updated: 22 1735     Magnesium 1.5 mg/dL     PERIPHERAL SMEAR, [facility] [[id number]] Collected: 22 004    Specimen: Blood Updated: 22 1720     Reference Lab Report --     [facility]  [address] [place] [address]  Phone: [phone] or [phone]  Fax: [phone]  Ti Elizabeth M.D., Medical Director    PATIENT NAME                                 LABORATORY NO.  [id number]   TONIA ADDISON                             [id number]  [id number]  [facility]                          AGE                SEX     SSN            CLIENT REF #  [id number]       1965   F       [id number]    [id number]  [address] [place] [address]                               REQUESTING M.D.       ATTENDING M.D..        COPY TO..  JEANNE JOSÉ    DATE COLLECTED        DATE RECEIVED          DATE REPORTED  2022    DIAGNOSIS:  PERIPHERAL SMEAR  Mixed leukocytosis with neutrophilia, monocytosis, lymphocytopenia, mild  granulocytic left shift and early toxic granulation.  Macrocytic, normochromic anemia. No schistocytes identified.  Thrombocytopenia without platelet clumping.      CLINICAL  HISTORY:  Acute on chronic renal failure    CLINICAL LABORATORY DATA  RBC        2.83 x10/6/ -L    RDW         14.6%  WBC        10.93 x10/3/-L    PLT         47 x10/3/-L  HGB        9.0 g/dl          LYMPHS      6.2%  HCT        28.1%             NEUTS       78.3%  MONO        10.9%  EOS         0.1%  BASO        0.3%    PERIPHERAL SMEAR MICROSCOPIC DESCRIPTION:  [place]gu[place] stained smears are reviewed microscopically. See diagnosis for details.    Professional interpretation rendered by Surjit Curran M.D., JHOANAAJoshPJosh at [facility], [address] [place] [address].    GROSS DESCRIPTION:  RECEIVED 4 STAINED SLIDES FOR REVIEW. -  11/11/22    REVIEWED, DIAGNOSED AND ELECTRONICALLY  SIGNED BY:    Surjit Curran M.D., F.C.A.P.  CPT CODES:        16195      UMIODO54 Activity [[id number]] Collected: 11/14/22 1538    Specimen: Blood Updated: 11/14/22 1556    Methylmalonic Acid, Serum [[id number]] Collected: 11/14/22 1538    Specimen: Blood Updated: 11/14/22 1556    Erythropoietin [[id number]] Collected: 11/14/22 1538    Specimen: Blood Updated: 11/14/22 1556    Ferritin [[id number]]  (Abnormal) Collected: 11/14/22 1435    Specimen: Blood Updated: 11/14/22 1534     Ferritin 688.50 ng/mL     Narrative:      Results may be falsely decreased if patient taking Biotin.      Reticulocytes [[id number]]  (Normal) Collected: 11/14/22 1435    Specimen: Blood Updated: 11/14/22 1507     Reticulocyte % 1.48 %      Reticulocyte Absolute 0.0400 10*6/mm3     Soluble Transferrin Receptor [[id number]] Collected: 11/14/22 1435    Specimen: Blood Updated: 11/14/22 1457    Blood Culture - Blood, Arm, Right [[id number]]  (Normal) Collected: 11/10/22 1415    Specimen: Blood from Arm, Right Updated: 11/14/22 1445     Blood Culture No growth at 4 days    Blood Culture - Blood, Arm, Left [[id number]]  (Normal) Collected: 11/10/22 1433    Specimen: Blood from Arm, Left Updated: 11/14/22 1445     Blood Culture No growth at 4 days    CBC (No Diff)  [[id number]]  (Abnormal) Collected: 22    Specimen: Blood Updated: 22     WBC 11.67 10*3/mm3      RBC 2.75 10*6/mm3      Hemoglobin 8.8 g/dL      Hematocrit 26.4 %      MCV 96.0 fL      MCH 32.0 pg      MCHC 33.3 g/dL      RDW 14.7 %      RDW-SD 50.3 fl      MPV 10.1 fL      Platelets 41 10*3/mm3     Narrative:      Platelet count verified by smear review    Basic Metabolic Panel [[id number]]  (Abnormal) Collected: 22    Specimen: Blood Updated: 22     Glucose 130 mg/dL      BUN 49 mg/dL      Creatinine 2.50 mg/dL      Sodium 130 mmol/L      Potassium 3.8 mmol/L      Chloride 97 mmol/L      CO2 24.5 mmol/L      Calcium 8.1 mg/dL      BUN/Creatinine Ratio 19.6     Anion Gap 8.5 mmol/L      eGFR 21.9 mL/min/1.73      Comment: National Kidney Foundation and American Society of Nephrology (ASN) Task Force recommended calculation based on the Chronic Kidney Disease Epidemiology Collaboration (CKD-EPI) equation refit without adjustment for race.       Narrative:      GFR Normal >60  Chronic Kidney Disease <60  Kidney Failure <15      Vancomycin, Trough [[id number]]  (Normal) Collected: 22 1640    Specimen: Blood Updated: 22 1715     Vancomycin Trough 16.40 mcg/mL     Narrative:      Therapeutic Ranges for Vancomycin    Vancomycin Random   5.0-40.0 mcg/mL  Vancomycin Trough   5.0-20.0 mcg/mL  Vancomycin Peak     20.0-40.0 mcg/mL             Physician Progress Notes (last 48 hours)      Michael Roman DO at 22 1936              [facility] HOSPITALIST PROGRESS NOTE     Patient Identification:  Name:  Malina Velasquez  Age:  57 y.o.  Sex:  female  :  1965  MRN:  [id number]  Visit Number:  [id number]  ROOM: [address]     Primary Care Provider:  Jeremiah Topete MD    Length of stay in inpatient status:  4    Subjective     Chief Compliant:    Chief Complaint   Patient presents with   • Weakness - Generalized     Pt states dr sent her here for high  wbc and kidney function        History of Presenting Illness: Patient seen and evaluated in follow-up for severe sepsis secondary to right lower extremity cellulitis with history of MRSA right knee infection with pyogenic arthritis and SUZETTE on CKD.  Patient at time of exam this morning feeling well and denying any acute complaints other than requesting thing for mild diarrhea.    Objective     Current Hospital Meds:  fluticasone, 2 spray, Nasal, Daily  folic acid, 500 mcg, Oral, Daily  guaiFENesin, 600 mg, Oral, Q12H  heparin (porcine), 5,000 Units, Subcutaneous, Q12H  levothyroxine, 100 mcg, Oral, Daily  loperamide, 2 mg, Oral, Once  magnesium sulfate, 2 g, Intravenous, Once  metoprolol tartrate, 12.5 mg, Oral, Q12H  nystatin, 1 application, Topical, BID  oxyCODONE, 15 mg, Oral, 4x Daily  pantoprazole, 40 mg, Oral, QAM  sodium chloride, 10 mL, Intravenous, Q12H  sodium chloride, 10 mL, Intravenous, Q12H  vancomycin, 1,250 mg, Intravenous, Q24H  zinc oxide, , Topical, BID         ----------------------------------------------------------------------------------------------------------------------  Vital Signs:  Temp:  [97.9 °F (36.6 °C)-99.2 °F (37.3 °C)] 98.3 °F (36.8 °C)  Heart Rate:  [] 101  Resp:  [12-20] 20  BP: ()/() 114/63  SpO2:  [91 %-100 %] 97 %  on   ;   Device (Oxygen Therapy): room air  Body mass index is 91.23 kg/m².      Intake/Output Summary (Last 24 hours) at 11/14/2022 1937  Last data filed at 11/14/2022 1600  Gross per 24 hour   Intake 240 ml   Output 2650 ml   Net -2410 ml      ----------------------------------------------------------------------------------------------------------------------  Physical exam:  Constitutional: Morbidly obese chronically ill-appearing female resting in bed, NAD  HENT:  Head:  Normocephalic and atraumatic.  Mouth:  Moist mucous membranes.    Eyes:  Conjunctivae and EOM are normal. No scleral icterus.    Cardiovascular:  Normal rate, regular rhythm  and normal heart sounds with no murmur.  Pulmonary/Chest:  No respiratory distress, no wheezes, no crackles, with normal breath sounds and good air movement.  Abdominal:  Soft.  Bowel sounds are normal.  No distension and no tenderness.   Musculoskeletal: Tenderness to palpation of the right lower extremity with out deformity.  No red or swollen joints anywhere.  Functional ROM intact.   Neurological:  Alert and oriented to person, place, and time.  No cranial nerve deficit.  No tongue deviation.  No facial droop.  No slurred speech.   Skin:  Skin is warm and dry.  Mild erythema of the very distal end of the right lower extremity abutting the dorsum of the right foot with some erythema present there as well.  Peripheral vascular:  Pulses in all 4 extremities with no clubbing, no cyanosis, lateral extensive lymphedema present, right greater than left.  Psychiatric: Appropriate mood and affect, pleasant.   ----------------------------------------------------------------------------------------------------------------------  WBC/HGB/HCT/PLT   11.67/8.8/26.4/41 (11/14 0821)  BUN/CREAT/GLUC/ALT/AST/LISSETT/LIP    49/2.50/130/--/--/--/-- (11/14 0820)  LYTES - Na/K/Cl/CO2: 130*/3.8/97*/24.5 (11/14 0820)        No results found for: URINECX  Blood Culture   Date Value Ref Range Status   11/10/2022 No growth at 4 days  Preliminary   11/10/2022 No growth at 4 days  Preliminary       I have personally looked at the labs and they are summarized above.  ----------------------------------------------------------------------------------------------------------------------  Detailed radiology reports for the last 24 hours:  No radiology results for the last day  Assessment & Plan      Severe sepsis  Septic shock per CMS guidelines  Lactic acidemia  Right lower extremity cellulitis and lymphedema  Right knee chronic osteomyelitis    -Patient seen and evaluated by orthopedic surgery who feels risk of surgery outweighs benefit for any  intervention at this time.    -Infectious disease consulted and following along and recommending 8-week course of antibiotic therapy with vancomycin followed by oral suppressive therapy.    -Consult placed for PICC line placement for above prolonged antibiotic therapy.    -Resume Ace wrappings to lower extremities to help with lymphedema.    SUZETTE on CKD    -Creatinine 4.4 admission improved with supportive care and antibiotic therapy and fluids.  Improved to 2.5 today.    -Nephrology consulting on along and worried about possibility of HUS with her anemia and thrombocytopenia and request hematology consultation    -Hematology consulted and seen and evaluated patient and agrees low likelihood of any HUS or TTP given lack of evidence of hemolysis on peripheral smear as well as normal normal LDH and haptoglobin.  ADAMTS 13 ordered.    Chronic anemia  Chronic thrombocytopenia  Chronic liver cirrhosis  Chronic hepatitis C    -Patient with history of cirrhosis which would be consistent with patient's cytopenias.    -Closely, hematology consult in interventions and recommendations as above.    -Started on folate supplementation     Chronic HFpEF    -TTE with grade 1 diastolic dysfunction, mild LV hypertrophy and mild aortic valve stenosis.    Essential hypertension    -Continue home metoprolol    Hypothyroidism    -Continue home levothyroxine      Copied text in portions of the note has been reviewed and is accurate as of 11/14/22    VTE Prophylaxis:   Mechanical Order History:     None      Pharmalogical Order History:      Ordered     Dose Route Frequency Stop    11/10/22 2214  heparin (porcine) 5000 UNIT/ML injection 5,000 Units         5,000 Units SC Every 12 Hours Scheduled --                Disposition likely home with IV antibiotic therapy.    Michael Roman DO  [facility]  11/14/22  19:37 EST      Electronically signed by Michael Roman DO at 11/14/22 1948     Colin Chris MD at 11/14/22  1350          Nephrology Progress Note      Subjective     Feeling better, no chest pain or shortness of breath.     Objective       Vital signs :     Temp:  [97.9 °F (36.6 °C)-99.2 °F (37.3 °C)] 97.9 °F (36.6 °C)  Heart Rate:  [] 101  Resp:  [13-20] 16  BP: ()/() 109/83    Intake/Output                             11/12/22 0701 - 11/13/22 0700 11/13/22 0701 - 11/14/22 0700 11/14/22 0701 - 11/15/22 0700     [id number] 5795-3006 Total 7609-8890 2161-8070 Total 1363-9277 4395-5204 Total                    Intake    P.O.  360  -- 360  300  -- 300  240  -- 240    I.V.  680  -- 680  444.4  -- 444.4  --  -- --    Total Intake 1040 -- 1040 744.4 -- 744.4 240 -- 240       Output    Urine  200  300 500  1100  1050 2150  1000  -- 1000    Total Output 200  1050 2150 1000 -- 1000           Physical Exam:    General Appearance : not in acute distress  Lungs : clear to auscultation, respirations regular  Heart :  regular rhythm & normal rate, normal S1, S2 and no murmur, no rub  Abdomen : soft, non distended  Extremities : 1+ edema  Neurologic :   orientated to person, place, time and situation, Grossly no focal deficits    Laboratory Data :     Albumin No results found for: ALBUMIN   Magnesium No results found for: MG       PTH               No results found for: PTH    CBC and coagulation:  Results from last 7 days   Lab Units 11/14/22  0821 11/13/22  0053 11/12/22  1015 11/12/22  0832 11/12/22  0433 11/11/22  1226 11/11/22  0648 11/11/22  0045 11/10/22  1800 11/10/22  1546 11/10/22  1433   PROCALCITONIN ng/mL  --   --   --   --   --   --   --  0.87*  --   --  1.13*   LACTATE mmol/L  --   --   --  3.7*  --  3.9* 4.6* 5.0*   < >  --  5.0*   CRP mg/dL  --   --  14.42*  --   --   --   --  17.21*  --   --  20.13*   WBC 10*3/mm3 11.67* 12.66*  --   --  12.64*  --  9.70 10.93*  --   --  13.35*   HEMOGLOBIN g/dL 8.8* 9.1*  --   --  8.8*  --  8.7* 9.0*  --   --  9.5*   HEMATOCRIT % 26.4* 28.0*  --   --   26.5*  --  27.3* 28.1*  --   --  29.4*   MCV fL 96.0 96.9  --   --  96.4  --  101.5* 99.3*  --   --  97.7*   MCHC g/dL 33.3 32.5  --   --  33.2  --  31.9 32.0  --   --  32.3   PLATELETS 10*3/mm3 41* 45*  --   --  49*  --  43* 47*  --   --  56*   INR   --   --   --   --   --   --   --   --   --  1.12*  --     < > = values in this interval not displayed.     Acid/base balance:      Renal and electrolytes:    Results from last 7 days   Lab Units 11/14/22  0820 11/13/22  0053 11/12/22  0433 11/11/22  0648 11/11/22  0045   SODIUM mmol/L 130* 133* 133* 130* 132*   POTASSIUM mmol/L 3.8 4.0 4.4 5.6* 4.4   CHLORIDE mmol/L 97* 96* 97* 96* 95*   CO2 mmol/L 24.5 25.0 24.8 18.2* 21.0*   BUN mg/dL 49* 57* 61* 69* 68*   CREATININE mg/dL 2.50* 3.23* 3.65* 4.30* 4.53*   CALCIUM mg/dL 8.1* 8.2* 8.3* 8.5* 8.8     Estimated Creatinine Clearance: 61.1 mL/min (A) (by C-G formula based on SCr of 2.5 mg/dL (H)).  @GFRCG:3@   Liver and pancreatic function:  Results from last 7 days   Lab Units 11/11/22  0648 11/11/22  0045 11/10/22  1546 11/10/22  1433   ALBUMIN g/dL 1.58* 2.11*  --  2.68*   BILIRUBIN mg/dL 0.7 0.7  --  0.8   ALK PHOS U/L 101 116  --  134*   AST (SGOT) U/L 34* 25  --  22   ALT (SGPT) U/L 10 11  --  9   LIPASE U/L  --   --  28  --          Cardiac:      Liver and pancreatic function:  Results from last 7 days   Lab Units 11/11/22  0648 11/11/22  0045 11/10/22  1546 11/10/22  1433   ALBUMIN g/dL 1.58* 2.11*  --  2.68*   BILIRUBIN mg/dL 0.7 0.7  --  0.8   ALK PHOS U/L 101 116  --  134*   AST (SGOT) U/L 34* 25  --  22   ALT (SGPT) U/L 10 11  --  9   LIPASE U/L  --   --  28  --        Medications :     fluticasone, 2 spray, Nasal, Daily  heparin (porcine), 5,000 Units, Subcutaneous, Q12H  levothyroxine, 100 mcg, Oral, Daily  metoprolol tartrate, 12.5 mg, Oral, Q12H  nystatin, 1 application, Topical, BID  oxyCODONE, 15 mg, Oral, 4x Daily  pantoprazole, 40 mg, Oral, QAM  sodium chloride, 10 mL, Intravenous, Q12H  sodium chloride,  10 mL, Intravenous, Q12H  vancomycin, 1,250 mg, Intravenous, Q24H  zinc oxide, , Topical, BID      lactated ringers, 50 mL/hr, Last Rate: 50 mL/hr (22 1803)      Assessment & Plan     1. SUZETTE, non ogliguric  2. Hyponatremia likely hypovolumic presumed chronic  3. Anion gap metabolic acidosis  4. Hyperkalemia  5. Anemia and thrombocytopenia  6. History of hep C induced liver Cirrhosis    Cr further improved to 2.5, non oliguric. Oral intake has improved a lot. Metabolic acidosis has resolved as well.   DC IVF, educated and counseled to for PO intake.     SUZETTE likley due to multifactorial, including ATN from prolonged pre renal state with concomitant use of  NSAIDS, and ATN from sepsis.   Other etiology to consider is HUS, and given the history of anemia, thrombocytopenia and hyperkalemia, needs to r/o TMA. Although anemia and thrombocytopenia can be due to cirrhosis with splenomegaly.  Baseline Cr unknown, admitted with 4.5  No hematuria, no proteinuria on UA  No hydronephrosis on CT        Colin Chris MD  22  13:50 EST      Electronically signed by Colin Chris MD at 22 1353     Kane Winter MD at 22 1059                     PROGRESS NOTE         Patient Identification:  Name:  Malina Velasquez  Age:  57 y.o.  Sex:  female  :  1965  MRN:  [id number]  Visit Number:  [id number]  Primary Care Provider:  Jeremiah Topete MD         LOS: 4 days       ----------------------------------------------------------------------------------------------------------------------  Subjective       Chief Complaints:    Weakness - Generalized (Pt states dr sent her here for high wbc and kidney function )        Interval History:      Patient sitting up in bed this morning.  Reports continued right lower extremity pain.  Continues with right lower extremity significant lymphedema, erythema and warmth.  WBC improved at  11.67.     Review of Systems:    Constitutional: no fever, chills and night  sweats.  Generalized fatigue.  Eyes: no eye drainage, itching or redness.  HEENT: no mouth sores, dysphagia or nose bleed.  Respiratory: no for shortness of breath, cough or production of sputum.  Cardiovascular: no chest pain, no palpitations, no orthopnea.  Gastrointestinal: no nausea, vomiting or diarrhea. No abdominal pain, hematemesis or rectal bleeding.  Genitourinary: no dysuria or polyuria.  Hematologic/lymphatic: no lymph node abnormalities, no easy bruising or easy bleeding.  Musculoskeletal: Right lower extremity pain.  Skin: No rash and no itching.  Neurological: no loss of consciousness, no seizure, no headache.  Behavioral/Psych: no depression or suicidal ideation.  Endocrine: no hot flashes.  Immunologic: negative.    ----------------------------------------------------------------------------------------------------------------------      Objective       Current [facility] Meds:  fluticasone, 2 spray, Nasal, Daily  heparin (porcine), 5,000 Units, Subcutaneous, Q12H  levothyroxine, 100 mcg, Oral, Daily  metoprolol tartrate, 12.5 mg, Oral, Q12H  nystatin, 1 application, Topical, BID  oxyCODONE, 15 mg, Oral, 4x Daily  pantoprazole, 40 mg, Oral, QAM  sodium chloride, 10 mL, Intravenous, Q12H  sodium chloride, 10 mL, Intravenous, Q12H  vancomycin, 1,000 mg, Intravenous, Q24H  zinc oxide, , Topical, BID      lactated ringers, 50 mL/hr, Last Rate: 50 mL/hr (11/13/22 1803)      ----------------------------------------------------------------------------------------------------------------------    Vital Signs:  Temp:  [98.3 °F (36.8 °C)-99.2 °F (37.3 °C)] 98.3 °F (36.8 °C)  Heart Rate:  [78-98] 94  Resp:  [13-20] 16  BP: ()/() 117/78  Mean Arterial Pressure (Non-Invasive) for the past 24 hrs (Last 3 readings):   Noninvasive MAP (mmHg)   11/14/22 0700 91   11/14/22 0200 89   11/14/22 0100 77     SpO2 Percentage    11/14/22 0100 11/14/22 0200 11/14/22 0700   SpO2: 92% 97% 91%     SpO2:  [90 %-98 %]  91 %  on   ;   Device (Oxygen Therapy): room air    Body mass index is 91.23 kg/m².  Wt Readings from Last 3 Encounters:   11/14/22 (!) 288 kg (635 lb 12.9 oz)        Intake/Output Summary (Last 24 hours) at 11/14/2022 1059  Last data filed at 11/14/2022 0800  Gross per 24 hour   Intake 834.4 ml   Output 2450 ml   Net -1615.6 ml     Diet Regular; Cardiac, Renal  ----------------------------------------------------------------------------------------------------------------------      Physical Exam:    Constitutional:  Well-developed and well-nourished.  No respiratory distress.   Sitting up in bed.  On room air with no apparent distress.    HENT:  Head: Normocephalic and atraumatic.  Mouth:  Moist mucous membranes.    Eyes:  Conjunctivae and EOM are normal.  No scleral icterus.  Neck:  Neck supple.  No JVD present.    Cardiovascular:  Normal rate, regular rhythm and normal heart sounds with no murmur. No edema.  Pulmonary/Chest:  No respiratory distress, no wheezes, no crackles, with normal breath sounds and good air movement.  Abdominal:  Soft.  Bowel sounds are normal.  No distension and no tenderness.   Musculoskeletal: Bilateral lower extremity lymphedema.  Right lower extremity edema with mild erythema and warmth, pain with palpation.  Neurological:  Alert and oriented to person, place, and time.  No facial droop.  No slurred speech.   Skin:  Skin is warm and dry.  No rash noted.  No pallor. Bilateral lower extremity lymphedema.  Right lower extremity edema with mild erythema and warmth, pain with palpation.  Psychiatric:  Normal mood and affect.  Behavior is normal.      ----------------------------------------------------------------------------------------------------------------------  Results from last 7 days   Lab Units 11/10/22  1433   TROPONIN T ng/mL 0.016           Results from last 7 days   Lab Units 11/14/22  0821 11/13/22  0053 11/12/22  1015 11/12/22  0832 11/12/22  0433 11/11/22  1226  11/11/22  0648 11/11/22  0045 11/10/22  1800 11/10/22  1546 11/10/22  1433   CRP mg/dL  --   --  14.42*  --   --   --   --  17.21*  --   --  20.13*   LACTATE mmol/L  --   --   --  3.7*  --  3.9* 4.6* 5.0*   < >  --  5.0*   WBC 10*3/mm3 11.67* 12.66*  --   --  12.64*  --  9.70 10.93*  --   --  13.35*   HEMOGLOBIN g/dL 8.8* 9.1*  --   --  8.8*  --  8.7* 9.0*  --   --  9.5*   HEMATOCRIT % 26.4* 28.0*  --   --  26.5*  --  27.3* 28.1*  --   --  29.4*   MCV fL 96.0 96.9  --   --  96.4  --  101.5* 99.3*  --   --  97.7*   MCHC g/dL 33.3 32.5  --   --  33.2  --  31.9 32.0  --   --  32.3   PLATELETS 10*3/mm3 41* 45*  --   --  49*  --  43* 47*  --   --  56*   INR   --   --   --   --   --   --   --   --   --  1.12*  --     < > = values in this interval not displayed.     Results from last 7 days   Lab Units 11/14/22  0820 11/13/22  0053 11/12/22  0433 11/11/22  0648 11/11/22  0045 11/10/22  1433   SODIUM mmol/L 130* 133* 133* 130* 132* 133*   POTASSIUM mmol/L 3.8 4.0 4.4 5.6* 4.4 5.2   CHLORIDE mmol/L 97* 96* 97* 96* 95* 94*   CO2 mmol/L 24.5 25.0 24.8 18.2* 21.0* 25.9   BUN mg/dL 49* 57* 61* 69* 68* 67*   CREATININE mg/dL 2.50* 3.23* 3.65* 4.30* 4.53* 4.41*   CALCIUM mg/dL 8.1* 8.2* 8.3* 8.5* 8.8 9.1   GLUCOSE mg/dL 130* 107* 94 112* 125* 114*   ALBUMIN g/dL  --   --   --  1.58* 2.11* 2.68*   BILIRUBIN mg/dL  --   --   --  0.7 0.7 0.8   ALK PHOS U/L  --   --   --  101 116 134*   AST (SGOT) U/L  --   --   --  34* 25 22   ALT (SGPT) U/L  --   --   --  10 11 9   Estimated Creatinine Clearance: 61.1 mL/min (A) (by C-G formula based on SCr of 2.5 mg/dL (H)).  No results found for: AMMONIA    No results found for: HGBA1C, POCGLU  Lab Results   Component Value Date    HGBA1C 4.70 (L) 11/10/2022     Lab Results   Component Value Date    TSH 0.960 11/10/2022       Blood Culture   Date Value Ref Range Status   11/10/2022 No growth at 24 hours  Preliminary   11/10/2022 No growth at 24 hours  Preliminary     No results found for:  URINECX  No results found for: WOUNDCX  No results found for: STOOLCX  No results found for: RESPCX  Pain Management Panel     Pain Management Panel Latest Ref Rng & Units 11/11/2022    AMPHETAMINES SCREEN, URINE Negative Negative    BARBITURATES SCREEN Negative Negative    BENZODIAZEPINE SCREEN, URINE Negative Negative    BUPRENORPHINEUR Negative Negative    COCAINE SCREEN, URINE Negative Negative    METHADONE SCREEN, URINE Negative Negative    METHAMPHETAMINEUR Negative Negative            ----------------------------------------------------------------------------------------------------------------------  Imaging Results (Last 24 Hours)     ** No results found for the last 24 hours. **          ----------------------------------------------------------------------------------------------------------------------    Pertinent Infectious Disease Results    Lactic acid 5.0 on admission.  Procalcitonin 0.87.  Urinalysis unremarkable.  COVID-19 and influenza PCR negative.  Blood cultures from 11/10/2022 show no growth thus far.  CT of the abdomen pelvis from 11/10/2022 reports no acute intra-abdominal abnormality, mildly nodular contour of the liver may represent early cirrhosis, borderline splenomegaly, CT of the chest from 11/10/2022 reports no acute cardiopulmonary disease, bibasilar subsegmental atelectasis, small hiatal hernia.  Right lower extremity CT reports advanced cellulitis of the right lower extremity extending from the level of the proximal thigh through the foot with maximum thickness of the inflammatory change and stranding medially measuring greater than 15 cm at the level of the distal thigh and knee, no drainable fluid collection no subcutaneous gas, no focal erosive changes to suggest osteomyelitis at this time.      GI PCR negative.  Urinalysis from 11/11/2022 unremarkable.  Right lower extremity venous duplex negative for DVT.  Right lower extremity erythema improved but continues with  significant edema.          Assessment/Plan       Assessment     Septic shock with lactic acid greater than 4 on admission  History of right knee pyogenic arthritis with MRSA with superimposed right knee chronic osteomyelitis  Right lower extremity cellulitis and lymphedema        Plan      I saw and examined the patient myself this morning with RUKHSANA Kennedy with whom I discussed the plan of care and primary RN and here are my findings:    Patient sitting up in bed this morning.  Reports continued right lower extremity pain.  Continues with right lower extremity significant lymphedema, erythema and warmth.  WBC improved at  11.67.  Based on previous culture with MRSA from joint aspiration at outside facility recommend to continue vancomycin monotherapy.  Patient will require prolonged course of antibiotic therapy for chronic osteomyelitis of the right knee secondary to complication from recurrent partially treated right knee septic arthritis and will need prolonged course of IV antibiotic therapy x8 weeks followed by oral suppressive therapy with very grim prognosis regarding clearing the infection without repetitive washout and aspiration.  Per orthopedic surgery risk outweighs benefit for surgical intervention at this time.  Recommend Ace wrap to right lower extremity.    Code Status:   Code Status and Medical Interventions:   Ordered at: 11/10/22 1939     Level Of Support Discussed With:    Patient     Code Status (Patient has no pulse and is not breathing):    CPR (Attempt to Resuscitate)     Medical Interventions (Patient has pulse or is breathing):    Full Support       RUKHSANA Kennedy  11/14/22  10:59 EST     Electronically signed by RUKHSANA Kennedy, 11/14/22, 11:01 AM EST.  Electronically signed by Kane Winter MD, 11/14/22, 1:05 PM EST.      Electronically signed by Kane Winter MD at 11/14/22 1306     Tameka Murguia APRN at 11/13/22 1218                     PROGRESS NOTE          Patient Identification:  Name:  Malina Velasquez  Age:  57 y.o.  Sex:  female  :  1965  MRN:  [id number]  Visit Number:  [id number]  Primary Care Provider:  Jeremiah Topete MD         LOS: 3 days       ----------------------------------------------------------------------------------------------------------------------  Subjective       Chief Complaints:    Weakness - Generalized (Pt states dr sent her here for high wbc and kidney function )        Interval History:      Patient resting comfortably in bed this morning.  Right lower extremity erythema improving.  WBC stable at 12.66.  CRP improving at 14.42.    Review of Systems:    Constitutional: no fever, chills and night sweats.  Generalized fatigue.  Eyes: no eye drainage, itching or redness.  HEENT: no mouth sores, dysphagia or nose bleed.  Respiratory: no for shortness of breath, cough or production of sputum.  Cardiovascular: no chest pain, no palpitations, no orthopnea.  Gastrointestinal: no nausea, vomiting or diarrhea. No abdominal pain, hematemesis or rectal bleeding.  Genitourinary: no dysuria or polyuria.  Hematologic/lymphatic: no lymph node abnormalities, no easy bruising or easy bleeding.  Musculoskeletal: Right lower extremity pain.  Skin: No rash and no itching.  Neurological: no loss of consciousness, no seizure, no headache.  Behavioral/Psych: no depression or suicidal ideation.  Endocrine: no hot flashes.  Immunologic: negative.    ----------------------------------------------------------------------------------------------------------------------      Objective       Current Hospital Meds:  fluticasone, 2 spray, Nasal, Daily  heparin (porcine), 5,000 Units, Subcutaneous, Q12H  levothyroxine, 100 mcg, Oral, Daily  metoprolol tartrate, 12.5 mg, Oral, Q12H  nystatin, 1 application, Topical, BID  oxyCODONE, 15 mg, Oral, 4x Daily  pantoprazole, 40 mg, Oral, QAM  sodium chloride, 10 mL, Intravenous, Q12H  sodium chloride, 10 mL,  Intravenous, Q12H  vancomycin, 1,000 mg, Intravenous, Q24H  zinc oxide, , Topical, BID      lactated ringers, 50 mL/hr, Last Rate: 50 mL/hr (11/13/22 0023)      ----------------------------------------------------------------------------------------------------------------------    Vital Signs:  Temp:  [98.3 °F (36.8 °C)-98.9 °F (37.2 °C)] 98.3 °F (36.8 °C)  Heart Rate:  [] 78  Resp:  [12-20] 15  BP: ()/(40-76) 96/54  Mean Arterial Pressure (Non-Invasive) for the past 24 hrs (Last 3 readings):   Noninvasive MAP (mmHg)   11/13/22 1100 67   11/13/22 1000 69   11/13/22 0900 79     SpO2 Percentage    11/13/22 0900 11/13/22 1000 11/13/22 1100   SpO2: 99% 91% 93%     SpO2:  [89 %-100 %] 93 %  on   ;   Device (Oxygen Therapy): room air    Body mass index is 91.26 kg/m².  Wt Readings from Last 3 Encounters:   11/13/22 (!) 288 kg (636 lb)        Intake/Output Summary (Last 24 hours) at 11/13/2022 1218  Last data filed at 11/13/2022 0800  Gross per 24 hour   Intake 1070 ml   Output 900 ml   Net 170 ml     Diet Regular; Cardiac, Renal  ----------------------------------------------------------------------------------------------------------------------      Physical Exam:    Constitutional:  Well-developed and well-nourished.  No respiratory distress.   Sitting up in bed.  On room air with no apparent distress.    HENT:  Head: Normocephalic and atraumatic.  Mouth:  Moist mucous membranes.    Eyes:  Conjunctivae and EOM are normal.  No scleral icterus.  Neck:  Neck supple.  No JVD present.    Cardiovascular:  Normal rate, regular rhythm and normal heart sounds with no murmur. No edema.  Pulmonary/Chest:  No respiratory distress, no wheezes, no crackles, with normal breath sounds and good air movement.  Abdominal:  Soft.  Bowel sounds are normal.  No distension and no tenderness.   Musculoskeletal: Bilateral lower extremity lymphedema.  Right lower extremity edema with mild erythema and warmth, pain with  palpation.  Neurological:  Alert and oriented to person, place, and time.  No facial droop.  No slurred speech.   Skin:  Skin is warm and dry.  No rash noted.  No pallor. Bilateral lower extremity lymphedema.  Right lower extremity edema with mild erythema and warmth, pain with palpation.  Psychiatric:  Normal mood and affect.  Behavior is normal.      ----------------------------------------------------------------------------------------------------------------------  Results from last 7 days   Lab Units 11/10/22  1433   TROPONIN T ng/mL 0.016           Results from last 7 days   Lab Units 11/13/22  0053 11/12/22  1015 11/12/22  0832 11/12/22  0433 11/11/22  1226 11/11/22  0648 11/11/22  0045 11/10/22  1800 11/10/22  1546 11/10/22  1433   CRP mg/dL  --  14.42*  --   --   --   --  17.21*  --   --  20.13*   LACTATE mmol/L  --   --  3.7*  --  3.9* 4.6* 5.0*   < >  --  5.0*   WBC 10*3/mm3 12.66*  --   --  12.64*  --  9.70 10.93*  --   --  13.35*   HEMOGLOBIN g/dL 9.1*  --   --  8.8*  --  8.7* 9.0*  --   --  9.5*   HEMATOCRIT % 28.0*  --   --  26.5*  --  27.3* 28.1*  --   --  29.4*   MCV fL 96.9  --   --  96.4  --  101.5* 99.3*  --   --  97.7*   MCHC g/dL 32.5  --   --  33.2  --  31.9 32.0  --   --  32.3   PLATELETS 10*3/mm3 45*  --   --  49*  --  43* 47*  --   --  56*   INR   --   --   --   --   --   --   --   --  1.12*  --     < > = values in this interval not displayed.     Results from last 7 days   Lab Units 11/13/22  0053 11/12/22  0433 11/11/22  0648 11/11/22  0045 11/10/22  1433   SODIUM mmol/L 133* 133* 130* 132* 133*   POTASSIUM mmol/L 4.0 4.4 5.6* 4.4 5.2   CHLORIDE mmol/L 96* 97* 96* 95* 94*   CO2 mmol/L 25.0 24.8 18.2* 21.0* 25.9   BUN mg/dL 57* 61* 69* 68* 67*   CREATININE mg/dL 3.23* 3.65* 4.30* 4.53* 4.41*   CALCIUM mg/dL 8.2* 8.3* 8.5* 8.8 9.1   GLUCOSE mg/dL 107* 94 112* 125* 114*   ALBUMIN g/dL  --   --  1.58* 2.11* 2.68*   BILIRUBIN mg/dL  --   --  0.7 0.7 0.8   ALK PHOS U/L  --   --  101 116 134*    AST (SGOT) U/L  --   --  34* 25 22   ALT (SGPT) U/L  --   --  10 11 9   Estimated Creatinine Clearance: 47.3 mL/min (A) (by C-G formula based on SCr of 3.23 mg/dL (H)).  No results found for: AMMONIA    Hemoglobin A1C   Date/Time Value Ref Range Status   11/10/2022 1433 4.70 (L) 4.80 - 5.60 % Final     Lab Results   Component Value Date    HGBA1C 4.70 (L) 11/10/2022     Lab Results   Component Value Date    TSH 0.960 11/10/2022       Blood Culture   Date Value Ref Range Status   11/10/2022 No growth at 24 hours  Preliminary   11/10/2022 No growth at 24 hours  Preliminary     No results found for: URINECX  No results found for: WOUNDCX  No results found for: STOOLCX  No results found for: RESPCX  Pain Management Panel     Pain Management Panel Latest Ref Rng & Units 11/11/2022    AMPHETAMINES SCREEN, URINE Negative Negative    BARBITURATES SCREEN Negative Negative    BENZODIAZEPINE SCREEN, URINE Negative Negative    BUPRENORPHINEUR Negative Negative    COCAINE SCREEN, URINE Negative Negative    METHADONE SCREEN, URINE Negative Negative    METHAMPHETAMINEUR Negative Negative            ----------------------------------------------------------------------------------------------------------------------  Imaging Results (Last 24 Hours)     ** No results found for the last 24 hours. **          ----------------------------------------------------------------------------------------------------------------------    Pertinent Infectious Disease Results    Lactic acid 5.0 on admission.  Procalcitonin 0.87.  Urinalysis unremarkable.  COVID-19 and influenza PCR negative.  Blood cultures from 11/10/2022 show no growth thus far.  CT of the abdomen pelvis from 11/10/2022 reports no acute intra-abdominal abnormality, mildly nodular contour of the liver may represent early cirrhosis, borderline splenomegaly, CT of the chest from 11/10/2022 reports no acute cardiopulmonary disease, bibasilar subsegmental atelectasis, small  hiatal hernia.  Right lower extremity CT reports advanced cellulitis of the right lower extremity extending from the level of the proximal thigh through the foot with maximum thickness of the inflammatory change and stranding medially measuring greater than 15 cm at the level of the distal thigh and knee, no drainable fluid collection no subcutaneous gas, no focal erosive changes to suggest osteomyelitis at this time.        GI PCR negative.  Urinalysis from 11/11/2022 unremarkable.  Right lower extremity venous duplex negative for DVT.  Right lower extremity erythema improved but continues with significant edema.      Assessment/Plan       Assessment     Septic shock with lactic acid greater than 4 on admission  History of right knee pyogenic arthritis with MRSA with superimposed right knee chronic osteomyelitis  Right lower extremity cellulitis and lymphedema        Plan        Patient resting comfortably in bed this morning.  Right lower extremity erythema improving.  WBC stable at 12.66.  CRP improving at 14.42.    Based on previous culture with MRSA from joint aspiration at outside facility recommend to continue vancomycin monotherapy.  Patient will require prolonged course of antibiotic therapy for chronic osteomyelitis of the right knee secondary to complication from recurrent partially treated right knee septic arthritis and will need prolonged course of IV antibiotic therapy x8 weeks followed by oral suppressive therapy with very grim prognosis regarding clearing the infection without repetitive washout and aspiration.  Poor orthopedic surgery risk outweighs benefit for surgical intervention at this time.    Code Status:   Code Status and Medical Interventions:   Ordered at: 11/10/22 1939     Level Of Support Discussed With:    Patient     Code Status (Patient has no pulse and is not breathing):    CPR (Attempt to Resuscitate)     Medical Interventions (Patient has pulse or is breathing):    Full Support        RUKHSANA Kennedy  22  12:18 EST     Electronically signed by RUKHSANA Kennedy, 22, 12:19 PM EST.        Electronically signed by Tameka Murguia APRN at 22 1219     Ann Marie Ochoa DO at 22 1146              [facility]     Patient Identification:  Name:  Malina Velasquez  Age:  57 y.o.  Sex:  female  :  1965  MRN:  [id number]  Visit Number:  [id number]  ROOM: [address]     Primary Care Provider:  Jeremiah Topete MD    Length of stay in inpatient status:  3    Subjective     Chief Compliant:    Chief Complaint   Patient presents with   • Weakness - Generalized     Pt states dr sent her here for high wbc and kidney function        History of Presenting Illness:    Patient reports her right lower extremity pain is still present constantly, but is improving. Denies chest pain or shortness of breath. Reports continued cough. No acute events noted overnight.    Objective     Current Hospital Meds:fluticasone, 2 spray, Nasal, Daily  heparin (porcine), 5,000 Units, Subcutaneous, Q12H  levothyroxine, 100 mcg, Oral, Daily  metoprolol tartrate, 12.5 mg, Oral, Q12H  nystatin, 1 application, Topical, BID  oxyCODONE, 15 mg, Oral, 4x Daily  pantoprazole, 40 mg, Oral, QAM  sodium chloride, 10 mL, Intravenous, Q12H  sodium chloride, 10 mL, Intravenous, Q12H  vancomycin, 1,000 mg, Intravenous, Q24H  zinc oxide, , Topical, BID    lactated ringers, 50 mL/hr, Last Rate: 50 mL/hr (22 0023)        Current Antimicrobial Therapy:  Anti-Infectives (From admission, onward)    Ordered     Dose/Rate Route Frequency Start Stop    22 1628  vancomycin (VANCOCIN) 1,000 mg in sodium chloride 0.9 % 250 mL IVPB        Ordering Provider: Tameka Murguia APRN    1,000 mg  over 60 Minutes Intravenous Every 24 Hours 22 1800 23 1759    11/10/22 1938  meropenem (MERREM) 1 g in sodium chloride 0.9 % 100 mL IVPB-VTB        Ordering Provider: Anuja  Miguel Tate MD    1 g  over 30 Minutes Intravenous Once 11/10/22 2000 11/10/22 2038    11/10/22 1539  aztreonam (AZACTAM) 2 g in sodium chloride 0.9 % 100 mL IVPB-VTB        Ordering Provider: Joyce Lindo PA    2 g  200 mL/hr over 30 Minutes Intravenous Once 11/10/22 1541 11/10/22 2009    11/10/22 1539  vancomycin 2500 mg/500 mL 0.9% NS IVPB ([facility])        Ordering Provider: Joyce Lindo PA    2,500 mg  over 120 Minutes Intravenous Once 11/10/22 1541 11/10/22 1950        Current Diuretic Therapy:  Diuretics (From admission, onward)    None        ----------------------------------------------------------------------------------------------------------------------  Vital Signs:  Temp:  [98.3 °F (36.8 °C)-98.9 °F (37.2 °C)] 98.3 °F (36.8 °C)  Heart Rate:  [] 78  Resp:  [12-20] 15  BP: ()/(40-76) 96/54  SpO2:  [89 %-100 %] 93 %  on   ;   Device (Oxygen Therapy): room air  Body mass index is 91.26 kg/m².    Wt Readings from Last 3 Encounters:   11/13/22 (!) 288 kg (636 lb)     Intake & Output (last 3 days)       11/10 0701  11/11 0700 11/11 0701  11/12 0700 11/12 0701  11/13 0700 11/13 0701  11/14 0700    P.O.  360 360 150    I.V. (mL/kg)   680 (2.4)     IV Piggyback 600       Total Intake(mL/kg) 600 (2.1) 360 (1.3) 1040 (3.6) 150 (0.5)    Urine (mL/kg/hr) 200 1600 (0.2) 500 (0.1) 400 (0.3)    Stool  0      Total Output 200 1600 500 400    Net +400 -1240 +540 -250            Stool Unmeasured Occurrence  1 x          Diet Regular; Cardiac, Renal  ----------------------------------------------------------------------------------------------------------------------  Physical exam:   Constitutional:  Well-developed, chronically ill appearing.  No acute distress.      HENT:  Head:  Normocephalic and atraumatic.    Cardiovascular:  Normal rate, regular rhythm and normal heart sounds with no murmur.  Pulmonary/Chest:  No respiratory distress, no wheezes, no crackles, with normal breath sounds and good air  movement.  Abdominal:  Soft. No distension and no tenderness.   Musculoskeletal: Right lower extremity is tender to palpation from knee down. No deformity.    Neurological:  Awake, alert, no focal deficit on gross examination. No slurred speech or facial droop.  Skin:  Skin is warm and dry. Erythema of right lower extremity is still mild, improved again from yesterday.  Peripheral vascular:  No cyanosis. Bilateral lymphedema noted, R>L. Extremities are warm and well perfused.  Psychiatric: Appropriate mood and affect  Edited by: Ann Marie Ochoa DO at 11/13/2022 1726    ----------------------------------------------------------------------------------------------------------------------  Results from last 7 days   Lab Units 11/13/22  0053 11/12/22  1015 11/12/22  0832 11/12/22  0433 11/11/22  1226 11/11/22  0648 11/11/22  0045 11/10/22  1800 11/10/22  1546 11/10/22  1433   CRP mg/dL  --  14.42*  --   --   --   --  17.21*  --   --  20.13*   LACTATE mmol/L  --   --  3.7*  --  3.9* 4.6* 5.0*   < >  --  5.0*   WBC 10*3/mm3 12.66*  --   --  12.64*  --  9.70 10.93*  --   --  13.35*   HEMOGLOBIN g/dL 9.1*  --   --  8.8*  --  8.7* 9.0*  --   --  9.5*   HEMATOCRIT % 28.0*  --   --  26.5*  --  27.3* 28.1*  --   --  29.4*   MCV fL 96.9  --   --  96.4  --  101.5* 99.3*  --   --  97.7*   MCHC g/dL 32.5  --   --  33.2  --  31.9 32.0  --   --  32.3   PLATELETS 10*3/mm3 45*  --   --  49*  --  43* 47*  --   --  56*   INR   --   --   --   --   --   --   --   --  1.12*  --     < > = values in this interval not displayed.         Results from last 7 days   Lab Units 11/13/22  0053 11/12/22  0433 11/11/22  0648 11/11/22  0045 11/10/22  1433   SODIUM mmol/L 133* 133* 130* 132* 133*   POTASSIUM mmol/L 4.0 4.4 5.6* 4.4 5.2   CHLORIDE mmol/L 96* 97* 96* 95* 94*   CO2 mmol/L 25.0 24.8 18.2* 21.0* 25.9   BUN mg/dL 57* 61* 69* 68* 67*   CREATININE mg/dL 3.23* 3.65* 4.30* 4.53* 4.41*   CALCIUM mg/dL 8.2* 8.3* 8.5* 8.8 9.1   GLUCOSE mg/dL 107* 94  112* 125* 114*   ALBUMIN g/dL  --   --  1.58* 2.11* 2.68*   BILIRUBIN mg/dL  --   --  0.7 0.7 0.8   ALK PHOS U/L  --   --  101 116 134*   AST (SGOT) U/L  --   --  34* 25 22   ALT (SGPT) U/L  --   --  10 11 9   Estimated Creatinine Clearance: 47.3 mL/min (A) (by C-G formula based on SCr of 3.23 mg/dL (H)).  No results found for: AMMONIA  Results from last 7 days   Lab Units 11/10/22  1433   TROPONIN T ng/mL 0.016             Hemoglobin A1C   Date/Time Value Ref Range Status   11/10/2022 1433 4.70 (L) 4.80 - 5.60 % Final     Lab Results   Component Value Date    TSH 0.960 11/10/2022     No results found for: PREGTESTUR, PREGSERUM, HCG, HCGQUANT  Pain Management Panel     Pain Management Panel Latest Ref Rng & Units 11/11/2022    AMPHETAMINES SCREEN, URINE Negative Negative    BARBITURATES SCREEN Negative Negative    BENZODIAZEPINE SCREEN, URINE Negative Negative    BUPRENORPHINEUR Negative Negative    COCAINE SCREEN, URINE Negative Negative    METHADONE SCREEN, URINE Negative Negative    METHAMPHETAMINEUR Negative Negative        Brief Urine Lab Results  (Last result in the past 365 days)      Color   Clarity   Blood   Leuk Est   Nitrite   Protein   CREAT   Urine HCG        11/11/22 1047 Yellow   Clear   Negative   Negative   Negative   Negative               Blood Culture   Date Value Ref Range Status   11/10/2022 No growth at 2 days  Preliminary   11/10/2022 No growth at 2 days  Preliminary     No results found for: URINECX  No results found for: WOUNDCX  No results found for: STOOLCX  No results found for: RESPCX  No results found for: AFBCX  Results from last 7 days   Lab Units 11/12/22  1015 11/12/22  0832 11/11/22  1226 11/11/22  0648 11/11/22  0045 11/10/22  2209 11/10/22  1800 11/10/22  1433   PROCALCITONIN ng/mL  --   --   --   --  0.87*  --   --  1.13*   LACTATE mmol/L  --  3.7* 3.9* 4.6* 5.0* 5.1* 4.2* 5.0*   CRP mg/dL 14.42*  --   --   --  17.21*  --   --  20.13*       I have personally looked at the labs  and they are summarized above.  ----------------------------------------------------------------------------------------------------------------------  Detailed radiology reports for the last 24 hours:  Imaging Results (Last 24 Hours)     ** No results found for the last 24 hours. **        Assessment & Plan    #Severe sepsis criteria met likely secondary to cellulitis of right lower extremity, present on admission   #Cellulitis of right foot and possible recurrent versus persistent septic arthritis of right knee joint , present on admission   #History of MRSA infection of right knee joint s/p washout  #History of pyogenic arthritis of right knee joint 07/2022  #Reported recent nausea, vomiting, and diarrhea  -Met severe sepsis criteria with HR >90, C-RP 20.13, WBC 13.35, lactate 5.0, and procalcitonin 1.13.   -Urinalysis with no evidence of acute UTI. No evidence of pneumonia on chest x-ray. No acute intra-abdominal findings on CT abdomen/pelvis.  - Continue vancomycin monotherapy per ID recommendations  - Appreciate ID and Orthopedic Surgery recommendations  - GI PCR negative  - Follow up blood cultures, currently no growth at 3 days     #Acute on chronic kidney disease, present on admission   #Anemia and thrombocytopenia  - Creatinine 4.41 at admission, per ED report creatinine was previously 1.34  - Nephrology consulted, appreciate assistance  - Creatinine is slowly improving, down to 3.23 today.   -Per Nephrology HUS is a possible concern with her anemia/thrombocytopenia--peripheral smear ordered. Per Nephrology's note there was a verbal report of no schistocytes seen. LDH mildly elevated, haptoglobin within normal limits. May also be secondary to cirrhosis vs SUZETTE. Repeat CBC in AM.   - Per discussion with patient she has been told in the past she has cirrhosis, and I think this is high on the differential diagnosis for her anemia/thrombocytopenia.  - Consider hematology referral when they return tomorrow, at  Nephrology's recommendation     #Reported history of CHF  - Echo ordered, showed grade I diastolic dysfunction, mild LV concentric hypertrophy, mild aortic valve stenosis    #Essential hypertension  - Monitor BP per protocol  - Continue home metoprolol    #Liver cirrhosis  #Chronic hepatitis C  #Hypoalbuminemia  - Liver enzymes normal at admission. Would likely benefit from referral back to GI at discharge (patient reports she saw a GI doctor years ago in [place]).    #Hypothyroidism  - TSH normal at admission. Continue home levothyroxine.  Edited by: Ann Marie Ochoa DO at 11/13/2022 5734    VTE Prophylaxis:   Mechanical Order History:     None      Pharmalogical Order History:      Ordered     Dose Route Frequency Stop    11/10/22 2214  heparin (porcine) 5000 UNIT/ML injection 5,000 Units         5,000 Units SC Every 12 Hours Scheduled --                Dispo: pending clinical course    Ann Marie Ochoa DO  [facility]  11/13/22  11:46 EST      Electronically signed by Ann Marie Ochoa DO at 11/13/22 4809

## 2022-11-15 NOTE — THERAPY EVALUATION
Patient Name: Malina Velasquez  : 1965    MRN: [id number]                              Today's Date: 11/15/2022       Admit Date: 11/10/2022    Visit Dx:     ICD-10-CM ICD-9-CM   1. Renal failure, unspecified chronicity  N19 586     Patient Active Problem List   Diagnosis   • Acute on chronic renal failure (HCC)     Past Medical History:   Diagnosis Date   • CHF (congestive heart failure) (HCC)    • COPD (chronic obstructive pulmonary disease) (HCC)    • Edema    • Hep C w/o coma, chronic (HCC)    • Hypertension    • Mitral valve prolapse      Past Surgical History:   Procedure Laterality Date   • GASTRIC BANDING     • JOINT ASPIRATION AND/OR INJECTION Right 2022      General Information     Row Name 11/15/22 1523          OT Time and Intention    Document Type evaluation  -LM     Mode of Treatment occupational therapy  -LM     Row Name 11/15/22 1523          General Information    Patient Profile Reviewed yes  -LM     Prior Level of Function max assist:;ADL's;all household mobility;transfer  patient reported that spouse assists with all badl at bedside.  Non ambulatory at home.  Does utilized HB, mark lift, w/c, bedpan.  -LM     Existing Precautions/Restrictions fall;other (see comments)  decreased skin integrity addressed by NSG and wound care  -LM     Barriers to Rehab medically complex;previous functional deficit  -LM     Row Name 11/15/22 1523          Living Environment    People in Home spouse  -LM     Row Name 11/15/22 1523          Cognition    Orientation Status (Cognition) oriented x 4  -LM     Row Name 11/15/22 1523          Safety Issues, Functional Mobility    Impairments Affecting Function (Mobility) balance;endurance/activity tolerance;strength  -LM           User Key  (r) = Recorded By, (t) = Taken By, (c) = Cosigned By    Initials Name Provider Type    LM Surekha Martinez, OT Occupational Therapist                 Mobility/ADL's     Row Name 11/15/22 1525          Transfers    Transfers --   unable to safely assess  -[facility] Name 11/15/22 1525          Activities of Daily Living    BADL Assessment/Intervention bathing;upper body dressing;lower body dressing;grooming;feeding;toileting  -[facility] Name 11/15/22 1525          Bathing Assessment/Intervention    [place] Level (Bathing) maximum assist (25% patient effort);dependent (less than 25% patient effort)  -LM     Row Name 11/15/22 1525          Upper Body Dressing Assessment/Training    [place] Level (Upper Body Dressing) maximum assist (25% patient effort)  -[facility] Name 11/15/22 1525          Lower Body Dressing Assessment/Training    [place] Level (Lower Body Dressing) dependent (less than 25% patient effort)  -[facility] Name 11/15/22 1525          Grooming Assessment/Training    [place] Level (Grooming) set up  -LM     Row Name 11/15/22 1525          Self-Feeding Assessment/Training    [place] Level (Feeding) set up  -LM     Row Name 11/15/22 1525          Toileting Assessment/Training    [place] Level (Toileting) dependent (less than 25% patient effort);maximum assist (25% patient effort)  -           User Key  (r) = Recorded By, (t) = Taken By, (c) = Cosigned By    Initials Name Provider Type     Surekha Martinez, OT Occupational Therapist               Obj/Interventions     Row Name 11/15/22 1526          Sensory Assessment (Somatosensory)    Sensory Assessment (Somatosensory) sensation intact  -LM     Row Name 11/15/22 1526          Vision Assessment/Intervention    Visual Impairment/Limitations WFL  -LM     Row Name 11/15/22 1526          Range of Motion Comprehensive    General Range of Motion no range of motion deficits identified  -LM     Row Name 11/15/22 1526          Strength Comprehensive (MMT)    General Manual Muscle Testing (MMT) Assessment no strength deficits identified  -LM     Row Name 11/15/22 1526          Motor Skills    Motor Skills functional endurance  -     Functional Endurance  F-  -           User Key  (r) = Recorded By, (t) = Taken By, (c) = Cosigned By    Initials Name Provider Type    Surekha Aleman, OT Occupational Therapist               Goals/Plan    No documentation.                Clinical Impression     Row Name 11/15/22 1526          Plan of Care Review    Plan of Care Reviewed With patient  -     Row Name 11/15/22 1526          Therapy Assessment/Plan (OT)    Patient/Family Therapy Goal Statement (OT) Patient plans to return home with spouse with prior assistance  -     Criteria for Skilled Therapeutic Interventions Met (OT) no;does not meet criteria for skilled intervention;no problems identified which require skilled intervention  Patient at baseline with badl and fxl mobility.  Recommend followup with  services versus outpatient for lymphedema needs.  Plans to return home with infusion therapy per patient.  -     Therapy Frequency (OT) evaluation only  -     Row Name 11/15/22 1526          Therapy Plan Review/Discharge Plan (OT)    Equipment Needs Upon Discharge (OT) other (see comments)  patient requests new padding for HB and alternative leg rests for current w/c.  Contacted .  -LM     Anticipated Discharge Disposition (OT) home with /7 care;home with home health  -     Row Name 11/15/22 1526          Positioning and Restraints    Post Treatment Position bed  -LM     In Bed call light within reach;encouraged to call for assist  -LM           User Key  (r) = Recorded By, (t) = Taken By, (c) = Cosigned By    Initials Name Provider Type    Surekha Aleman, KRISTOPHER Occupational Therapist               Outcome Measures    No documentation.                   OT Recommendation and Plan  Therapy Frequency (OT): evaluation only  Plan of Care Review  Plan of Care Reviewed With: patient     Time Calculation:     Therapy Charges for Today     Code Description Service Date Service Provider Modifiers Qty    [id number]  OT EVAL MOD COMPLEXITY 4  11/15/2022 Surekha Martinez, OT GO 1               Surekha Martinez, OT  11/15/2022

## 2022-11-15 NOTE — NURSING NOTE
Spoke with Dr. Chris regarding PICC line placement who states it is OKAY for patient to have a picc placed.

## 2022-11-15 NOTE — PROGRESS NOTES
[facility] HOSPITALIST PROGRESS NOTE     Patient Identification:  Name:  Malina Velasquez  Age:  57 y.o.  Sex:  female  :  1965  MRN:  [id number]  Visit Number:  [id number]  ROOM: [address]     Primary Care Provider:  Jeremiah Topete MD    Length of stay in inpatient status:  5    Subjective     Chief Compliant:    Chief Complaint   Patient presents with   • Weakness - Generalized     Pt states dr sent her here for high wbc and kidney function        History of Presenting Illness: Patient seen and evaluated in follow-up for severe sepsis secondary to right lower extremity cellulitis with history of MRSA right knee infection with pyogenic arthritis and SUZETTE on CKD.  Patient today complaining of cough and dry mouth.  Patient with successful PICC line placement today.    Objective     Current Hospital Meds:  fluticasone, 2 spray, Nasal, Daily  folic acid, 500 mcg, Oral, Daily  heparin (porcine), 5,000 Units, Subcutaneous, Q12H  levothyroxine, 100 mcg, Oral, Daily  loperamide, 2 mg, Oral, Once  metoprolol tartrate, 12.5 mg, Oral, Q12H  nystatin, 1 application, Topical, BID  oxyCODONE, 15 mg, Oral, 4x Daily  pantoprazole, 40 mg, Oral, QAM  sodium chloride, 10 mL, Intravenous, Q12H  sodium chloride, 10 mL, Intravenous, Q12H  vancomycin, 1,250 mg, Intravenous, Q24H  zinc oxide, , Topical, BID         ----------------------------------------------------------------------------------------------------------------------  Vital Signs:  Temp:  [97.9 °F (36.6 °C)-99.6 °F (37.6 °C)] 97.9 °F (36.6 °C)  Heart Rate:  [101-112] 102  Resp:  [11-22] 12  BP: (100-139)/() 119/73  SpO2:  [93 %-98 %] 95 %  on   ;   Device (Oxygen Therapy): room air  Body mass index is 91.23 kg/m².      Intake/Output Summary (Last 24 hours) at 11/15/2022 1825  Last data filed at 11/15/2022 1600  Gross per 24 hour   Intake 360 ml   Output 1900 ml   Net -1540 ml       ----------------------------------------------------------------------------------------------------------------------  Physical exam:  Constitutional: Morbidly obese chronically ill-appearing female resting in bed, NAD  HENT:  Head:  Normocephalic and atraumatic.  Mouth:  Moist mucous membranes.    Eyes:  Conjunctivae and EOM are normal. No scleral icterus.    Cardiovascular:  Normal rate, regular rhythm and normal heart sounds with no murmur.  Pulmonary/Chest:  No respiratory distress, no wheezes, no crackles, with normal breath sounds and good air movement.  Abdominal:  Soft.  Bowel sounds are normal.  No distension and no tenderness.   Musculoskeletal: Tenderness to palpation of the right lower extremity with out deformity.  No red or swollen joints anywhere.  Functional ROM intact.   Neurological:  Alert and oriented to person, place, and time.  No cranial nerve deficit.  No tongue deviation.  No facial droop.  No slurred speech.   Skin:  Skin is warm and dry.  Mild erythema of the very distal end of the right lower extremity abutting the dorsum of the right foot with some erythema present there as well.  Peripheral vascular:  Pulses in all 4 extremities with no clubbing, no cyanosis, lateral extensive lymphedema present, right greater than left.  Psychiatric: Appropriate mood and affect, pleasant.   ----------------------------------------------------------------------------------------------------------------------  WBC/HGB/HCT/PLT   11.11/8.2/25.8/32 (11/15 0019)  BUN/CREAT/GLUC/ALT/AST/LISSETT/LIP    44/2.26/118/--/--/--/-- (11/15 0019)  LYTES - Na/K/Cl/CO2: 130*/3.8/97*/24.3 (11/15 0019)        No results found for: URINECX  Blood Culture   Date Value Ref Range Status   11/10/2022 No growth at 4 days  Preliminary   11/10/2022 No growth at 4 days  Preliminary       I have personally looked at the labs and they are summarized  above.  ----------------------------------------------------------------------------------------------------------------------  Detailed radiology reports for the last 24 hours:  No radiology results for the last day  Assessment & Plan      Severe sepsis  Septic shock per CMS guidelines  Lactic acidemia  Right lower extremity cellulitis and lymphedema  Right knee chronic osteomyelitis    -Patient seen and evaluated by orthopedic surgery who feels risk of surgery outweighs benefit for any intervention at this time.    -Infectious disease consulted and following along and recommending 8-week course of antibiotic therapy with vancomycin followed by oral suppressive therapy.    -Consult placed for PICC line placement for above prolonged antibiotic therapy and successfully placed    -Resume Ace wrappings to lower extremities to help with lymphedema.    SUZETTE on CKD    -Creatinine 4.4 admission improved with supportive care and antibiotic therapy and fluids.  Improved to 2.5 today.    -Nephrology consulting on along and worried about possibility of HUS with her anemia and thrombocytopenia and request hematology consultation    -Hematology consulted and seen and evaluated patient and agrees low likelihood of any HUS or TTP given lack of evidence of hemolysis on peripheral smear as well as normal normal LDH and haptoglobin.  ADAMTS 13 ordered.    Chronic anemia  Chronic thrombocytopenia  Chronic liver cirrhosis  Chronic hepatitis C    -Patient with history of cirrhosis which would be consistent with patient's cytopenias.    -Hematology/oncology consulted and recommendations as above.    -Started on folate supplementation     -Patient with decline in platelets further today, will monitor repeat CBC in a.m.    Chronic HFpEF    -TTE with grade 1 diastolic dysfunction, mild LV hypertrophy and mild aortic valve stenosis.    Essential hypertension    -Continue home metoprolol    Hypothyroidism    -Continue home  levothyroxine      Copied text in portions of the note has been reviewed and is accurate as of 11/15/22    VTE Prophylaxis:   Mechanical Order History:     None      Pharmalogical Order History:      Ordered     Dose Route Frequency Stop    11/10/22 6418  heparin (porcine) 5000 UNIT/ML injection 5,000 Units         5,000 Units SC Every 12 Hours Scheduled --                Disposition home with IV antibiotic therapy.    Michael Roman DO  [facility]  11/15/22  18:25 EST

## 2022-11-15 NOTE — CASE MANAGEMENT/SOCIAL WORK
Continued Stay Note  [facility] ROS[facility] Donnelly     Patient Name: Malina Velasquez  MRN: [id number]  Today's Date: 11/15/2022    Admit Date: 11/10/2022       Discharge Plan     Row Name 11/15/22 0943       Plan    Plan CM spoke with patient on this date. Pleasant. Noted patient will need prolonged IV abx therapy x 8 weeks per ID recs. Patient states she would prefer [facility] for IV abx supplies. Patient states she gave herself IV Abx about 3 years ago and feels comfortably in giving them again. She has a PICC ordered, but says it has not yet been placed. Her , Ant will be available to assist if needed. Referral to be sent to [facility] per pt request. Will await medication copay amount and will update patient once copay is available. She prefers [facility] as she has had them previously. She is requesting a Trapeze bar, Bariatric bed and a Bariartric wheelchair. She states she got a smaller bed 3 years ago and it is broken and too small for her. She states her wheelchair needs devices to elevate her legs. She prefers [facility] She states she used [facility] in the past, but they are no longer in business. CM/SS will follow and assist with disposition needs.    Patient/Family in Agreement with Plan yes               Discharge Codes    No documentation.               Expected Discharge Date and Time     Expected Discharge Date Expected Discharge Time    Nov 15, 2022             Tabby Sebastian RN

## 2022-11-16 ENCOUNTER — APPOINTMENT (OUTPATIENT)
Dept: ULTRASOUND IMAGING | Facility: HOSPITAL | Age: 57
End: 2022-11-16

## 2022-11-16 ENCOUNTER — APPOINTMENT (OUTPATIENT)
Dept: GENERAL RADIOLOGY | Facility: HOSPITAL | Age: 57
End: 2022-11-16

## 2022-11-16 LAB
ANION GAP SERPL CALCULATED.3IONS-SCNC: 9.6 MMOL/L (ref 5–15)
BUN SERPL-MCNC: 40 MG/DL (ref 6–20)
BUN/CREAT SERPL: 20.1 (ref 7–25)
CALCIUM SPEC-SCNC: 7.8 MG/DL (ref 8.6–10.5)
CHLORIDE SERPL-SCNC: 96 MMOL/L (ref 98–107)
CO2 SERPL-SCNC: 25.4 MMOL/L (ref 22–29)
CREAT SERPL-MCNC: 1.99 MG/DL (ref 0.57–1)
DEPRECATED RDW RBC AUTO: 51.1 FL (ref 37–54)
EGFRCR SERPLBLD CKD-EPI 2021: 28.8 ML/MIN/1.73
ERYTHROCYTE [DISTWIDTH] IN BLOOD BY AUTOMATED COUNT: 14.8 % (ref 12.3–15.4)
GLUCOSE SERPL-MCNC: 143 MG/DL (ref 65–99)
HCT VFR BLD AUTO: 25.1 % (ref 34–46.6)
HGB BLD-MCNC: 8.4 G/DL (ref 12–15.9)
MCH RBC QN AUTO: 32.2 PG (ref 26.6–33)
MCHC RBC AUTO-ENTMCNC: 33.5 G/DL (ref 31.5–35.7)
MCV RBC AUTO: 96.2 FL (ref 79–97)
PLATELET # BLD AUTO: 42 10*3/MM3 (ref 140–450)
PMV BLD AUTO: 11 FL (ref 6–12)
POTASSIUM SERPL-SCNC: 3.8 MMOL/L (ref 3.5–5.2)
QT INTERVAL: 362 MS
QT INTERVAL: 390 MS
QTC INTERVAL: 487 MS
QTC INTERVAL: 512 MS
RBC # BLD AUTO: 2.61 10*6/MM3 (ref 3.77–5.28)
SODIUM SERPL-SCNC: 131 MMOL/L (ref 136–145)
STFR SERPL-SCNC: 10.3 NMOL/L (ref 12.2–27.3)
TROPONIN T SERPL-MCNC: 0.01 NG/ML (ref 0–0.03)
TROPONIN T SERPL-MCNC: <0.01 NG/ML (ref 0–0.03)
VANCOMYCIN SERPL-MCNC: 19.5 MCG/ML (ref 5–40)
VWF CP ACT/NOR PPP CHRO: 26.9 %
WBC NRBC COR # BLD: 12.16 10*3/MM3 (ref 3.4–10.8)

## 2022-11-16 PROCEDURE — 76700 US EXAM ABDOM COMPLETE: CPT

## 2022-11-16 PROCEDURE — 71045 X-RAY EXAM CHEST 1 VIEW: CPT | Performed by: RADIOLOGY

## 2022-11-16 PROCEDURE — 76700 US EXAM ABDOM COMPLETE: CPT | Performed by: RADIOLOGY

## 2022-11-16 PROCEDURE — 84484 ASSAY OF TROPONIN QUANT: CPT | Performed by: STUDENT IN AN ORGANIZED HEALTH CARE EDUCATION/TRAINING PROGRAM

## 2022-11-16 PROCEDURE — 93005 ELECTROCARDIOGRAM TRACING: CPT | Performed by: STUDENT IN AN ORGANIZED HEALTH CARE EDUCATION/TRAINING PROGRAM

## 2022-11-16 PROCEDURE — 99232 SBSQ HOSP IP/OBS MODERATE 35: CPT | Performed by: STUDENT IN AN ORGANIZED HEALTH CARE EDUCATION/TRAINING PROGRAM

## 2022-11-16 PROCEDURE — 25010000002 VANCOMYCIN 5 G RECONSTITUTED SOLUTION: Performed by: INTERNAL MEDICINE

## 2022-11-16 PROCEDURE — 99232 SBSQ HOSP IP/OBS MODERATE 35: CPT | Performed by: INTERNAL MEDICINE

## 2022-11-16 PROCEDURE — 99233 SBSQ HOSP IP/OBS HIGH 50: CPT | Performed by: NURSE PRACTITIONER

## 2022-11-16 PROCEDURE — 85027 COMPLETE CBC AUTOMATED: CPT | Performed by: STUDENT IN AN ORGANIZED HEALTH CARE EDUCATION/TRAINING PROGRAM

## 2022-11-16 PROCEDURE — 80202 ASSAY OF VANCOMYCIN: CPT

## 2022-11-16 PROCEDURE — 25010000002 HEPARIN (PORCINE) PER 1000 UNITS: Performed by: HOSPITALIST

## 2022-11-16 PROCEDURE — 71045 X-RAY EXAM CHEST 1 VIEW: CPT

## 2022-11-16 PROCEDURE — 25010000002 PROCHLORPERAZINE 10 MG/2ML SOLUTION: Performed by: INTERNAL MEDICINE

## 2022-11-16 PROCEDURE — 80048 BASIC METABOLIC PNL TOTAL CA: CPT

## 2022-11-16 PROCEDURE — 25010000002 MORPHINE PER 10 MG: Performed by: HOSPITALIST

## 2022-11-16 PROCEDURE — 93010 ELECTROCARDIOGRAM REPORT: CPT | Performed by: INTERNAL MEDICINE

## 2022-11-16 RX ORDER — OXYCODONE HYDROCHLORIDE 5 MG/1
10 TABLET ORAL 3 TIMES DAILY PRN
Status: DISCONTINUED | OUTPATIENT
Start: 2022-11-16 | End: 2022-11-18 | Stop reason: HOSPADM

## 2022-11-16 RX ORDER — BUMETANIDE 1 MG/1
2 TABLET ORAL DAILY
Status: DISCONTINUED | OUTPATIENT
Start: 2022-11-16 | End: 2022-11-16

## 2022-11-16 RX ORDER — BUMETANIDE 0.25 MG/ML
1 INJECTION INTRAMUSCULAR; INTRAVENOUS ONCE
Status: COMPLETED | OUTPATIENT
Start: 2022-11-16 | End: 2022-11-16

## 2022-11-16 RX ORDER — PROCHLORPERAZINE EDISYLATE 5 MG/ML
10 INJECTION INTRAMUSCULAR; INTRAVENOUS EVERY 6 HOURS PRN
Status: DISCONTINUED | OUTPATIENT
Start: 2022-11-16 | End: 2022-11-18 | Stop reason: HOSPADM

## 2022-11-16 RX ORDER — BENZONATATE 100 MG/1
100 CAPSULE ORAL EVERY 4 HOURS PRN
Status: DISCONTINUED | OUTPATIENT
Start: 2022-11-16 | End: 2022-11-18 | Stop reason: HOSPADM

## 2022-11-16 RX ADMIN — BENZONATATE 100 MG: 100 CAPSULE ORAL at 02:08

## 2022-11-16 RX ADMIN — HEPARIN SODIUM 5000 UNITS: 5000 INJECTION INTRAVENOUS; SUBCUTANEOUS at 08:23

## 2022-11-16 RX ADMIN — BENZONATATE 100 MG: 100 CAPSULE ORAL at 14:12

## 2022-11-16 RX ADMIN — VANCOMYCIN HYDROCHLORIDE 1250 MG: 5 INJECTION, POWDER, LYOPHILIZED, FOR SOLUTION INTRAVENOUS at 21:45

## 2022-11-16 RX ADMIN — BENZONATATE 100 MG: 100 CAPSULE ORAL at 17:50

## 2022-11-16 RX ADMIN — Medication 10 ML: at 08:22

## 2022-11-16 RX ADMIN — LEVOTHYROXINE SODIUM 100 MCG: 100 TABLET ORAL at 08:23

## 2022-11-16 RX ADMIN — OXYCODONE HYDROCHLORIDE 15 MG: 15 TABLET ORAL at 12:05

## 2022-11-16 RX ADMIN — BENZONATATE 100 MG: 100 CAPSULE ORAL at 21:46

## 2022-11-16 RX ADMIN — OXYCODONE 5 MG: 5 TABLET ORAL at 04:45

## 2022-11-16 RX ADMIN — Medication 10 ML: at 20:11

## 2022-11-16 RX ADMIN — OXYCODONE HYDROCHLORIDE 15 MG: 15 TABLET ORAL at 08:23

## 2022-11-16 RX ADMIN — NITROGLYCERIN 0.4 MG: 0.4 TABLET, ORALLY DISINTEGRATING SUBLINGUAL at 09:07

## 2022-11-16 RX ADMIN — GUAIFENESIN AND DEXTROMETHORPHAN 5 ML: 100; 10 SYRUP ORAL at 17:50

## 2022-11-16 RX ADMIN — NYSTATIN 1 APPLICATION: 100000 CREAM TOPICAL at 20:11

## 2022-11-16 RX ADMIN — OXYCODONE HYDROCHLORIDE 15 MG: 15 TABLET ORAL at 20:10

## 2022-11-16 RX ADMIN — ZINC OXIDE: 200 OINTMENT TOPICAL at 08:26

## 2022-11-16 RX ADMIN — HEPARIN SODIUM 5000 UNITS: 5000 INJECTION INTRAVENOUS; SUBCUTANEOUS at 20:09

## 2022-11-16 RX ADMIN — NITROGLYCERIN 0.4 MG: 0.4 TABLET, ORALLY DISINTEGRATING SUBLINGUAL at 09:02

## 2022-11-16 RX ADMIN — PROCHLORPERAZINE EDISYLATE 10 MG: 5 INJECTION INTRAMUSCULAR; INTRAVENOUS at 02:29

## 2022-11-16 RX ADMIN — GUAIFENESIN AND DEXTROMETHORPHAN 5 ML: 100; 10 SYRUP ORAL at 14:12

## 2022-11-16 RX ADMIN — OXYCODONE HYDROCHLORIDE 15 MG: 15 TABLET ORAL at 17:50

## 2022-11-16 RX ADMIN — Medication 500 MCG: at 08:23

## 2022-11-16 RX ADMIN — METOPROLOL TARTRATE 12.5 MG: 25 TABLET, FILM COATED ORAL at 20:10

## 2022-11-16 RX ADMIN — MORPHINE SULFATE 2 MG: 2 INJECTION, SOLUTION INTRAMUSCULAR; INTRAVENOUS at 00:14

## 2022-11-16 RX ADMIN — ZINC OXIDE: 200 OINTMENT TOPICAL at 20:11

## 2022-11-16 RX ADMIN — Medication 10 ML: at 20:12

## 2022-11-16 RX ADMIN — BENZONATATE 100 MG: 100 CAPSULE ORAL at 09:01

## 2022-11-16 RX ADMIN — NITROGLYCERIN 0.4 MG: 0.4 TABLET, ORALLY DISINTEGRATING SUBLINGUAL at 08:34

## 2022-11-16 RX ADMIN — METOPROLOL TARTRATE 12.5 MG: 25 TABLET, FILM COATED ORAL at 08:23

## 2022-11-16 RX ADMIN — Medication 10 ML: at 08:26

## 2022-11-16 RX ADMIN — BUMETANIDE 1 MG: 0.25 INJECTION, SOLUTION INTRAMUSCULAR; INTRAVENOUS at 12:05

## 2022-11-16 RX ADMIN — FLUTICASONE PROPIONATE 2 SPRAY: 50 SPRAY, METERED NASAL at 08:26

## 2022-11-16 RX ADMIN — GUAIFENESIN AND DEXTROMETHORPHAN 5 ML: 100; 10 SYRUP ORAL at 02:08

## 2022-11-16 RX ADMIN — PANTOPRAZOLE SODIUM 40 MG: 40 TABLET, DELAYED RELEASE ORAL at 08:23

## 2022-11-16 RX ADMIN — GUAIFENESIN AND DEXTROMETHORPHAN 5 ML: 100; 10 SYRUP ORAL at 09:01

## 2022-11-16 RX ADMIN — NYSTATIN 1 APPLICATION: 100000 CREAM TOPICAL at 08:26

## 2022-11-16 NOTE — PROGRESS NOTES
Nephrology Progress Note      Subjective     No nausea, or vomiting, no chest pain or shortness of breath.     Objective       Vital signs :     Temp:  [97.9 °F (36.6 °C)-98.7 °F (37.1 °C)] 98.5 °F (36.9 °C)  Heart Rate:  [] 97  Resp:  [11-18] 13  BP: (104-133)/(61-97) 121/83    Intake/Output                             11/14/22 0701 - 11/15/22 0700 11/15/22 0701 - 11/16/22 0700 11/16/22 0701 - 11/17/22 0700     4822-5651 0455-1846 Total 5350-8329 3466-9218 Total 7790-9389 8770-0225 Total                    Intake    P.O.  240  -- 240  360  -- 360  360  -- 360    Total Intake 240 -- 240 360 -- 360 360 -- 360       Output    Urine  1600  800 2400  1100  900 2000  --  -- --    Total Output 8926 558 7028 2428 306 3456 -- -- --           Physical Exam:    General Appearance : not in acute distress  Lungs : clear to auscultation, respirations regular  Heart :  regular rhythm & normal rate, normal S1, S2 and no murmur, no rub  Abdomen : soft, non distended  Extremities : 1+ edema  Neurologic :   orientated to person, place, time and situation, Grossly no focal deficits    Laboratory Data :     Albumin No results found for: ALBUMIN   Magnesium Magnesium   Date Value Ref Range Status   11/15/2022 1.5 (L) 1.6 - 2.6 mg/dL Final   11/14/2022 1.5 (L) 1.6 - 2.6 mg/dL Final          PTH               No results found for: PTH    CBC and coagulation:  Results from last 7 days   Lab Units 11/16/22  0012 11/15/22  0019 11/14/22  0821 11/13/22  0053 11/12/22  1015 11/12/22  0832 11/12/22  0433 11/11/22  1226 11/11/22  0648 11/11/22  0045 11/10/22  1800 11/10/22  1546 11/10/22  1433   PROCALCITONIN ng/mL  --   --   --   --   --   --   --   --   --  0.87*  --   --  1.13*   LACTATE mmol/L  --   --   --   --   --  3.7*  --  3.9* 4.6* 5.0*   < >  --  5.0*   CRP mg/dL  --   --   --   --  14.42*  --   --   --   --  17.21*  --   --  20.13*   WBC 10*3/mm3 12.16* 11.11* 11.67*   < >  --   --    < >  --  9.70 10.93*  --   --  13.35*    HEMOGLOBIN g/dL 8.4* 8.2* 8.8*   < >  --   --    < >  --  8.7* 9.0*  --   --  9.5*   HEMATOCRIT % 25.1* 25.8* 26.4*   < >  --   --    < >  --  27.3* 28.1*  --   --  29.4*   MCV fL 96.2 101.6* 96.0   < >  --   --    < >  --  101.5* 99.3*  --   --  97.7*   MCHC g/dL 33.5 31.8 33.3   < >  --   --    < >  --  31.9 32.0  --   --  32.3   PLATELETS 10*3/mm3 42* 32* 41*   < >  --   --    < >  --  43* 47*  --   --  56*   INR   --   --   --   --   --   --   --   --   --   --   --  1.12*  --     < > = values in this interval not displayed.     Acid/base balance:      Renal and electrolytes:    Results from last 7 days   Lab Units 11/16/22  0012 11/15/22  0019 11/14/22  1435 11/14/22  0820 11/13/22  0053 11/12/22  0433   SODIUM mmol/L 131* 130*  --  130* 133* 133*   POTASSIUM mmol/L 3.8 3.8  --  3.8 4.0 4.4   MAGNESIUM mg/dL  --  1.5* 1.5*  --   --   --    CHLORIDE mmol/L 96* 97*  --  97* 96* 97*   CO2 mmol/L 25.4 24.3  --  24.5 25.0 24.8   BUN mg/dL 40* 44*  --  49* 57* 61*   CREATININE mg/dL 1.99* 2.26*  --  2.50* 3.23* 3.65*   CALCIUM mg/dL 7.8* 7.6*  --  8.1* 8.2* 8.3*     Estimated Creatinine Clearance: 76.8 mL/min (A) (by C-G formula based on SCr of 1.99 mg/dL (H)).  @GFRCG:3@   Liver and pancreatic function:  Results from last 7 days   Lab Units 11/11/22  0648 11/11/22  0045 11/10/22  1546 11/10/22  1433   ALBUMIN g/dL 1.58* 2.11*  --  2.68*   BILIRUBIN mg/dL 0.7 0.7  --  0.8   ALK PHOS U/L 101 116  --  134*   AST (SGOT) U/L 34* 25  --  22   ALT (SGPT) U/L 10 11  --  9   LIPASE U/L  --   --  28  --          Cardiac:      Liver and pancreatic function:  Results from last 7 days   Lab Units 11/11/22  0648 11/11/22  0045 11/10/22  1546 11/10/22  1433   ALBUMIN g/dL 1.58* 2.11*  --  2.68*   BILIRUBIN mg/dL 0.7 0.7  --  0.8   ALK PHOS U/L 101 116  --  134*   AST (SGOT) U/L 34* 25  --  22   ALT (SGPT) U/L 10 11  --  9   LIPASE U/L  --   --  28  --        Medications :     bumetanide, 1 mg, Intravenous, Once  fluticasone, 2  spray, Nasal, Daily  folic acid, 500 mcg, Oral, Daily  heparin (porcine), 5,000 Units, Subcutaneous, Q12H  levothyroxine, 100 mcg, Oral, Daily  loperamide, 2 mg, Oral, Once  metoprolol tartrate, 12.5 mg, Oral, Q12H  nystatin, 1 application, Topical, BID  oxyCODONE, 15 mg, Oral, 4x Daily  pantoprazole, 40 mg, Oral, QAM  sodium chloride, 10 mL, Intravenous, Q12H  sodium chloride, 10 mL, Intravenous, Q12H  sodium chloride, 10 mL, Intravenous, Q12H  sodium chloride, 10 mL, Intravenous, Q12H  vancomycin, 1,250 mg, Intravenous, Q24H  zinc oxide, , Topical, BID         Assessment & Plan     1. SUZETTE, non ogliguric  2. Hyponatremia likely hypovolumic presumed chronic  3. Anion gap metabolic acidosis  4. Hyperkalemia  5. Anemia and thrombocytopenia  6. History of hep C induced liver Cirrhosis    Cr further improved 1.9, non oliguric. Oral fluid intake is better continue, educated and counseled.   There is no active intervention from renal stands pont, I will sign off. Please call if any questions  Follow up in renal clinic in 4 wks    SUZETTE likley due to multifactorial, including ATN from prolonged pre renal state with concomitant use of  NSAIDS, and ATN from sepsis.   Other etiology to consider is HUS, and given the history of anemia, thrombocytopenia and hyperkalemia, needs to r/o TMA. Although anemia and thrombocytopenia can be due to cirrhosis with splenomegaly. But negative schistocytes on peripheral blood film  Baseline Cr unknown, admitted with 4.5  No hematuria, no proteinuria on UA  No hydronephrosis on CT        Colin Chris MD  11/16/22  11:17 EST

## 2022-11-16 NOTE — CASE MANAGEMENT/SOCIAL WORK
Discharge Planning Assessment  [facility]     Patient Name: Malina Velasquez  MRN: [id number]  Today's Date: 11/16/2022    Admit Date: 11/10/2022    Plan: SS spoke with pt on this date. Pt plans are still to return home at discharge. Pt lives at [address] [place] [address]. Pt lives with spouse (Ant) and plans to return home at discharge. Pt does not utilize HH services at this time. Pt did utilize [facility] in the past. Pt PCP Josesito Jacobson. Pt utilizes hospital bed, lift via [facility], walker via private purhcase for DME needs. Pt utilizes [facility]. Pt does not have no POA/Living will. Pt will need an ambulance to provide transportation at discharge. Pt voiced needing a new hospital bed and new wheelchair. If IV antibiotics are needed at discharge pt is agreeable to administer with education from HH services. If ordered, pt perfers [facility]. SS to follow      Discharge Plan     Row Name 11/16/22 1424       Plan    Plan SS spoke with pt on this date. Pt plans are still to return home at discharge. Pt lives at [address] [place] [address]. Pt lives with spouse (Ant) and plans to return home at discharge. Pt does not utilize HH services at this time. Pt did utilize [facility] in the past. Pt PCP Josesito Jacobson. Pt utilizes hospital bed, lift via [facility], walker via private purhcase for DME needs. Pt utilizes [facility]. Pt does not have no POA/Living will. Pt will need an ambulance to provide transportation at discharge. Pt voiced needing a new hospital bed and new wheelchair. If IV antibiotics are needed at discharge pt is agreeable to administer with education from HH services. If ordered, pt perfers [facility]. SS to follow             SVETA Marmolejo

## 2022-11-16 NOTE — PROGRESS NOTES
Date:  11/16/22    CC: Follow up of anemia and thrombocytopenia     Interval History: Ms. Velasquez is resting in bed this morning. She complains of fatigue. She denies obvious bleeding from any source. She has no other complaints today.     Review of Systems  A comprehensive 14 point review of systems was performed.  Significant findings as mentioned above.  All other systems reviewed and are negative.     Objective     Vital Signs  Vitals:    11/16/22 0800 11/16/22 0835 11/16/22 0900 11/16/22 0907   BP:  128/76 128/79 121/83   BP Location:       Patient Position:       Pulse:  105 97    Resp:  17 13    Temp: 98.5 °F (36.9 °C)      TempSrc: Oral      SpO2:  96% 95%    Weight:       Height:         Physical Exam:  General: Morbidly obese, chronically ill appearing. In no acute distress    Head: ATNC   Eyes: PERRL, No evidence of conjunctivitis.   Nose: No nasal discharge.   Mouth: Oral mucosal membranes moist. No oral ulceration or hemorrhages.   Neck: Neck supple. No thyromegaly. No JVD.   Lungs: CTAB  Heart: RRR. No murmurs, rubs, or gallops.   Abdomen: Soft. Bowel sounds are normoactive. Nontender with palpation.  Extremities: No cyanosis or edema.   Integumentary: RLE with Ace wrap dsg in place. CDI  Neurologic: MS as above, grossly non focal exam     Labs / Studies:  Lab Results   Component Value Date    WBC 12.16 (H) 11/16/2022    HGB 8.4 (L) 11/16/2022    HCT 25.1 (L) 11/16/2022    MCV 96.2 11/16/2022    RDW 14.8 11/16/2022    PLT 42 (C) 11/16/2022    NEUTRORELPCT 78.0 (H) 11/12/2022    LYMPHORELPCT 11.3 (L) 11/12/2022    MONORELPCT 7.4 11/12/2022    EOSRELPCT 0.2 (L) 11/12/2022    BASORELPCT 0.3 11/12/2022    NEUTROABS 8.86 (H) 11/13/2022    LYMPHSABS 1.43 11/12/2022       Lab Results   Component Value Date     (L) 11/16/2022    K 3.8 11/16/2022    CO2 25.4 11/16/2022    CL 96 (L) 11/16/2022    BUN 40 (H) 11/16/2022    CREATININE 1.99 (H) 11/16/2022    GLUCOSE 143 (H) 11/16/2022    CALCIUM 7.8 (L)  11/16/2022    ALKPHOS 101 11/11/2022    AST 34 (H) 11/11/2022    ALT 10 11/11/2022    BILITOT 0.7 11/11/2022    ALBUMIN 1.58 (L) 11/11/2022    PROTEINTOT 5.9 (L) 11/11/2022    MG 1.5 (L) 11/15/2022         Lab Results   Component Value Date    FERRITIN 688.50 (H) 11/14/2022    IRON 51 11/11/2022    TIBC 155 (L) 11/11/2022    LABIRON 33 11/11/2022    [id number] 1,932 (H) 11/11/2022    FOLATE 4.94 11/11/2022       Imaging:  Adult Transthoracic Echo Complete w/ Color, Spectral and Contrast if necessary per protocol    Result Date: 11/11/2022  •  Left ventricular wall thickness is consistent with mild concentric hypertrophy. •  Left ventricular diastolic function is consistent with (grade I) impaired relaxation. •  Mild aortic valve stenosis is present. •  Estimated right ventricular systolic pressure from tricuspid regurgitation is mildly elevated (35-45 mmHg).     CT Abdomen Pelvis Without Contrast    Result Date: 11/10/2022  CT Abdomen Pelvis WO INDICATION: abd pain TECHNIQUE: CT of the abdomen and pelvis without IV contrast. Coronal and sagittal reconstructions were obtained.  Radiation dose reduction techniques included automated exposure control or exposure modulation based on body size. Count of known CT and cardiac nuc med studies performed in previous 12 months: 0. COMPARISON: None available. FINDINGS: Evaluation of abdominal viscera is limited due to lack of intravenous contrast. Study is also degraded by patient body habitus. Lower chest: Unremarkable. Liver: Mildly nodular in contour. No solid mass. Gallbladder and bile ducts: Surgically absent gallbladder. No biliary dilatation. Spleen: Borderline enlarged with scattered punctate calcifications, likely sequelae of old granulomatous disease. Pancreas: Diffusely atrophic. Adrenals: Unremarkable. Kidneys and ureters: No renal or ureteral stones. No hydronephrosis. Bowel: Postsurgical changes of the stomach. Nondilated bowel with no wall thickening.Appendix not  visualized, but no inflammatory changes present in the right lower quadrant. Bladder: Underdistended. Reproductive organs: Normal uterus. No adnexal masses. Lymph nodes: Unremarkable. Peritoneum: Unremarkable. Vessels: Prominent portosystemic collateral vessels. Abdominal wall: Unremarkable. Bones: Multilevel degenerative changes of the lumbar spine. Levoconvex curvature of the lumbar spine..     1.  Exam is limited by lack of intravenous contrast and patient body habitus. 2.  No acute intra-abdominal abnormality. 3.  Mildly nodular contour of the liver which may reflect early cirrhosis. Recommend correlation with liver function tests. 4.  Borderline splenomegaly. Signer Name: Mark Bird MD  Signed: 11/10/2022 5:17 PM  Workstation Name: [id number]  [facility]    CT Chest Without Contrast Diagnostic    Result Date: 11/10/2022  CT Chest WO CLINICAL HISTORY: soa. COMPARISON: None. TECHNIQUE: CT chest without contrast. Coronal and sagittal reconstructions were obtained.  Radiation dose reduction techniques included automated exposure control or exposure modulation based on body size. Count of known CT and cardiac nuc med studies performed in previous 12 months: 0. FINDINGS: Lungs: Bibasilar subsegmental atelectasis. No focal consolidation or mass. Small calcified pulmonary nodule in left upper lobe, likely pulmonary granuloma. Pleura: No pleural effusions. Cardiovascular: Normal heart size. No pericardial effusion. Normal course and caliber of the thoracic aorta. Mediastinum: Evaluation is limited by lack of intravenous contrast. Small calcified hilar mediastinal lymph nodes. Osseous: No acute osseous abnormality. Degenerative changes of the thoracic spine and dextroconvex curvature of the thoracic spine. Other: None. Upper abdomen: Small hiatal hernia.     1.  No acute cardiopulmonary disease. 2.  Bibasilar subsegmental atelectasis. 3.  Small hiatal hernia. Signer Name: Mark Bird MD  Signed:  11/10/2022 5:20 PM  Workstation Name: [id number]  [facility] [place]    US Abdomen Complete    Result Date: 11/16/2022  EXAM:   US Abdomen Complete  EXAM DATE:   11/16/2022 12:04 AM  CLINICAL HISTORY:   assess for hepato-splenomegaly; N19-Unspecified kidney failure  TECHNIQUE:   Real-time ultrasound of the abdomen with image documentation.  COMPARISON:   No relevant prior studies available.  FINDINGS:   Liver:  Unremarkable.  No mass.  No intrahepatic bile duct dilation.       Kidneys:  Right kidney measures 10.64 cm .  Left kidney measures 8.74 cm.  No stones.  No hydronephrosis.   Spleen:    The spleen measures 13.1 cm in maximum dimension.   Aorta:  Unremarkable.  No aneurysm.   Inferior vena cava:  Unremarkable.        The spleen measures 13.1 cm in maximum dimension. Probably within normal limits for size.  This report was finalized on 11/16/2022 8:09 AM by Dr. Ryan Whitney MD.      XR Chest 1 View    Result Date: 11/16/2022  EXAM:   XR Chest, 1 View  EXAM DATE:   11/16/2022 7:51 AM  CLINICAL HISTORY:   worsening cough and congestion, increasing dyspnea; N19-Unspecified kidney failure  TECHNIQUE:   Frontal view of the chest.  COMPARISON:   11/10/2022  FINDINGS:   LUNGS:  Some improved aeration specifically of the left lung base. Atelectasis in the lung bases.   PLEURAL SPACE:  Tiny bilateral pleural effusions.  No pneumothorax.   HEART:  Cardiomegaly again noted.   MEDIASTINUM:  Unremarkable.   BONES/JOINTS:  Unremarkable.   TUBES, LINES AND DEVICES:  Right PICC with tip in SVC.      1.  Some improved aeration specifically of the left lung base. 2.  Tiny bilateral pleural effusions.  This report was finalized on 11/16/2022 8:08 AM by Dr. Ryan Whitney MD.      XR Chest 1 View    Result Date: 11/10/2022  EXAM:   XR Chest, 1 View  EXAM DATE:   11/10/2022 3:13 PM  CLINICAL HISTORY:   cough  TECHNIQUE:   Frontal view of the chest.  COMPARISON:   No relevant prior studies available.  FINDINGS:    Lungs:  Pulmonary vascular congestion.  Right basilar airspace disease.   Pleural space:  Unremarkable.  No pneumothorax.   Heart:  Marked cardiomegaly.   Mediastinum:  Unremarkable.   Bones/joints:  Unremarkable.      1.  Cardiomegaly and pulmonary vascular congestion. 2.  Right basilar airspace disease.  This report was finalized on 11/10/2022 4:15 PM by Dr. Carlos Manuel King MD.      US Venous Doppler Lower Extremity Right (duplex)    Result Date: 11/11/2022  US VENOUS DOPPLER LOWER EXTREMITY RIGHT (DUPLEX)-  CLINICAL INDICATION: Painful RLE, obese, bed bound; N19-Unspecified kidney failure   COMPARISON: None available  TECHNIQUE: Color Doppler imaging was used with compression and augmentation to evaluate the right lower extremity deep venous system.  FINDINGS: There is patent spontaneous flow from the common femoral vein through the posterior tibial veins. There was no internal clot or area of noncompressibility in the right lower extremity. Normal augmentation was elicited where applicable.       No DVT in the right lower extremity on today's exam.  This report was finalized on 11/11/2022 9:58 AM by Dr. Drew Cm MD.      CT Lower Extremity Right Without Contrast    Result Date: 11/10/2022  CT LE RT WO INDICATION:  Redness and swelling of the right lower extremity. Renal failure. Possible septic arthritis. TECHNIQUE: CT of the right lower extremity without IV contrast. Coronal and sagittal reconstructions were obtained.  Radiation dose reduction techniques included automated exposure control or exposure modulation based on body size. Count of known CT and cardiac nuc med studies performed in previous 12 months: 0.  COMPARISON:  None available. FINDINGS: There is motion degradation. Study sensitivity limited by noncontrast technique. Included pelvis shows moderate to large stool ball in the rectum. Decompressed bladder. No drainable fluid collection. Advanced degenerative change of the right knee. Small  nonspecific knee joint effusion. Degenerative change in the right ankle and right foot. Nonspecific enlarged inguinal lymph nodes are present. These may be reactive but should be correlated clinically. Beginning at the level of the right thigh there is moderate induration of the subcutaneous fat laterally. This becomes more severe distally and there is overlying skin thickening. By the level of the mid to distal thigh, there is advanced injection of the subcutaneous fat and more confluent severe inflammatory change of the subcutaneous fat medially with associated overlying skin thickening. Maximum thickness is up to greater than 15 cm medially at the level of the knee. There is no well-defined drainable fluid collection identified. Below the level of the knee there is advanced subcutaneous fat stranding extending to the level of the ankle with associated overlying skin thickening. There is extension into the right foot. No subcutaneous gas identified. No focal erosive changes. Imaging features are nonspecific but suggestive of advanced right lower extremity cellulitis. Although there is some injection of the fat within the muscular planes of the thigh anterior greater extent the distal lower extremity, and the majority of the inflammatory change involves the subcutaneous fat and extends to the level of the surrounding fascia.     1. Imaging findings most likely reflecting advanced cellulitis of the right lower extremity extending from the level of the proximal thigh through the foot with maximum thickness of the inflammatory change and stranding medially measuring greater than 15 cm at the level of the distal thigh and knee. No distinct drainable fluid collection. No subcutaneous gas. No focal erosive changes to suggest osteomyelitis at this time. If there is persistent clinical concern for osteomyelitis, MRI could be performed for further evaluation. Signer Name: Faustino Lima MD  Signed: 11/10/2022 11:26 PM   Workstation Name: [id number]  [facility] [place]    Assessment & Plan:  Malina Velasquez is a 57 y.o.  female with     1.  Macrocytic anemia:  2. Folic acid deficiency  -Multifactorial and secondary to underlying renal dysfunction along with folic acid deficiency. No evidence of hemolysis with normal retic, LDH and haptoglobin. No schistocytes on PBS. B12 was replete. Iron studies c/w AOCD/inflammation. She was started on folic acid replacement and would continue indefinitely.   -CBC today shows Hg 8.4 which remains stable. Continue transfusion support. Would transfuse PRBCs for Hg <7 or <8 if symptomatic.     3. Thrombocytopenia:  -Multifactorial and secondary to liver dysfunction/cirrhosis consequently causing decreased thrombopoietin production (her spleen is normal measuring ~13.1) with chronic untreated hepatitis C, underlying inflammation and acute infection also contributing causing bone marrow suppression. She also has folic acid deficiency as above and was started on supplement which she needs to continue indefinitely.    -Peripheral smear with moderate macrocytic anemia and no schistocytes identified.  - No evidence of MAHA. [id number]  is low due to cirrhosis but detectable and therefore not c/w TTP.   -Platelets today are 42,000 and stable.   -Suggest to transfuse platelets for platelets <50,000 prior to procedures or any active bleeding. Otherwise, transfuse for platelet count 10-20,000.  -Avoid NSAIDs. Recommend follow up with GI/hepatology for management of cirrhosis and Hepatitis C.     4. Leukocytosis:  -Likely secondary to sepsis/infection and underlying inflammation. PBS with no evidence of blasts or dysplastic WBC.       Thank you for allowing us to participate in the care of Ms. Velasquez. Will sign off. Please do not hesitate to contact Hem/Onc with any questions/concerns.       Electronically signed by: RUKHSANA Mcfarland , November 16, 2022 09:15 EST

## 2022-11-16 NOTE — CASE MANAGEMENT/SOCIAL WORK
Continued Stay Note  ANA ROSA Donnelly     Patient Name: Malina Velasquez  MRN: [id number]  Today's Date: 11/16/2022    Admit Date: 11/10/2022       Discharge Plan     Row Name 11/16/22 1044       Plan    Plan Spoke with Brooklynn Elena with [facility] via phone who states she is in house today and will be providing home IV abx administration education with patient today. Per ID recs, patient will need IV Vanc x 8 weeks for Osteomyelitis Right knee. [place] has verified with insurance that patient will have $0 copay for home IV abx. CM spoke with patient regarding DME and she prefers [facility] for needed DME.  Plans to return home at [place]. SS will arrange HH at [place]. CM/SS following.    Patient/Family in Agreement with Plan yes               Discharge Codes    No documentation.               Expected Discharge Date and Time     Expected Discharge Date Expected Discharge Time    Nov 16, 2022             Tabby Sebastian RN

## 2022-11-16 NOTE — PLAN OF CARE
Goal Outcome Evaluation:           Progress: no change  Outcome Evaluation: Pt has been complaining of intermittent pain this shift out of her right lower extremity. She has been given scheduled pain meds, as her BP will allow. Pt's bp has been on the softer side this shift and has been borderline in regards to medication parameters. Pt has been encouraged to turn but has been refusing assistanve from providers. She has also been complaining of intermittent chest pain this shift, however pt believes it to be muscular. EKG and troponins were ordered per protocol and MD aware. Pt also provided with PRN cough medications for a cough which is congested and nonproductive. Pt receiving iv abx.

## 2022-11-16 NOTE — PROGRESS NOTES
PROGRESS NOTE         Patient Identification:  Name:  Malina Velasquez  Age:  57 y.o.  Sex:  female  :  1965  MRN:  [id number]  Visit Number:  [id number]  Primary Care Provider:  Jeremiah Topete MD         LOS: 6 days       ----------------------------------------------------------------------------------------------------------------------  Subjective       Chief Complaints:    Weakness - Generalized (Pt states dr sent her here for high wbc and kidney function )        Interval History:      Patient sitting up in bed this morning.  Reports her chest is hurting this morning it feels hot.  WBC slightly elevated 12.16.  Currently on room air with no apparent distress.  Afebrile, denies diarrhea.  Chest x-ray from 2022 reports improved aeration specifically of the left lung base, tiny bilateral pleural effusions.     Review of Systems:    Constitutional: no fever, chills and night sweats.  Generalized fatigue.  Eyes: no eye drainage, itching or redness.  HEENT: no mouth sores, dysphagia or nose bleed.  Respiratory: no for shortness of breath, cough or production of sputum.  Cardiovascular: no chest pain, no palpitations, no orthopnea.  Gastrointestinal: no nausea, vomiting or diarrhea. No abdominal pain, hematemesis or rectal bleeding.  Genitourinary: no dysuria or polyuria.  Hematologic/lymphatic: no lymph node abnormalities, no easy bruising or easy bleeding.  Musculoskeletal: Right lower extremity pain.  Skin: No rash and no itching.  Neurological: no loss of consciousness, no seizure, no headache.  Behavioral/Psych: no depression or suicidal ideation.  Endocrine: no hot flashes.  Immunologic: negative.    ----------------------------------------------------------------------------------------------------------------------      Objective       [facility] Meds:  bumetanide, 1 mg, Intravenous, Once  fluticasone, 2 spray, Nasal, Daily  folic acid, 500 mcg, Oral, Daily  heparin  (porcine), 5,000 Units, Subcutaneous, Q12H  levothyroxine, 100 mcg, Oral, Daily  loperamide, 2 mg, Oral, Once  metoprolol tartrate, 12.5 mg, Oral, Q12H  nystatin, 1 application, Topical, BID  oxyCODONE, 15 mg, Oral, 4x Daily  pantoprazole, 40 mg, Oral, QAM  sodium chloride, 10 mL, Intravenous, Q12H  sodium chloride, 10 mL, Intravenous, Q12H  sodium chloride, 10 mL, Intravenous, Q12H  sodium chloride, 10 mL, Intravenous, Q12H  vancomycin, 1,250 mg, Intravenous, Q24H  zinc oxide, , Topical, BID         ----------------------------------------------------------------------------------------------------------------------    Vital Signs:  Temp:  [97.9 °F (36.6 °C)-98.7 °F (37.1 °C)] 98.5 °F (36.9 °C)  Heart Rate:  [] 97  Resp:  [11-18] 13  BP: (104-133)/(61-97) 121/83  Mean Arterial Pressure (Non-Invasive) for the past 24 hrs (Last 3 readings):   Noninvasive MAP (mmHg)   11/16/22 0900 92   11/16/22 0835 92   11/16/22 0700 86     SpO2 Percentage    11/16/22 0700 11/16/22 0835 11/16/22 0900   SpO2: 93% 96% 95%     SpO2:  [93 %-98 %] 95 %  on   ;   Device (Oxygen Therapy): room air    Body mass index is 91.23 kg/m².  Wt Readings from Last 3 Encounters:   11/16/22 (!) 288 kg (635 lb 12.9 oz)        Intake/Output Summary (Last 24 hours) at 11/16/2022 1111  Last data filed at 11/16/2022 0834  Gross per 24 hour   Intake 360 ml   Output 2000 ml   Net -1640 ml     Diet Soft to Chew (NDD 3); Ground Meat; Regular Consistency; Cardiac Diets, Renal Diets; Healthy Heart (2-3 Na+); Low Sodium (2-3g)  ----------------------------------------------------------------------------------------------------------------------      Physical Exam:    Constitutional:  Well-developed and well-nourished.  No respiratory distress.   Sitting up in bed.  On room air with no apparent distress.    HENT:  Head: Normocephalic and atraumatic.  Mouth:  Moist mucous membranes.    Eyes:  Conjunctivae and EOM are normal.  No scleral icterus.  Neck:  Neck  supple.  No JVD present.    Cardiovascular:  Normal rate, regular rhythm and normal heart sounds with no murmur. No edema.  Pulmonary/Chest:  No respiratory distress, no wheezes, no crackles, with normal breath sounds and good air movement.  Abdominal:  Soft.  Bowel sounds are normal.  No distension and no tenderness.   Musculoskeletal: Bilateral lower extremity lymphedema.  Right lower extremity edema with mild erythema and warmth, pain with palpation, currently in ACE wrap.  Neurological:  Alert and oriented to person, place, and time.  No facial droop.  No slurred speech.   Skin:  Skin is warm and dry.  No rash noted.  No pallor. Bilateral lower extremity lymphedema.  Right lower extremity edema with mild erythema and warmth, pain with palpation, currently in ACE wrap.  Psychiatric:  Normal mood and affect.  Behavior is normal.      ----------------------------------------------------------------------------------------------------------------------  Results from last 7 days   Lab Units 11/16/22  0912 11/16/22  0506 11/10/22  1433   TROPONIN T ng/mL 0.014 <0.010 0.016           Results from last 7 days   Lab Units 11/16/22  0012 11/15/22  0019 11/14/22  0821 11/13/22  0053 11/12/22  1015 11/12/22  0832 11/12/22  0433 11/11/22  1226 11/11/22  0648 11/11/22  0045 11/10/22  1800 11/10/22  1546 11/10/22  1433   CRP mg/dL  --   --   --   --  14.42*  --   --   --   --  17.21*  --   --  20.13*   LACTATE mmol/L  --   --   --   --   --  3.7*  --  3.9* 4.6* 5.0*   < >  --  5.0*   WBC 10*3/mm3 12.16* 11.11* 11.67*   < >  --   --    < >  --  9.70 10.93*  --   --  13.35*   HEMOGLOBIN g/dL 8.4* 8.2* 8.8*   < >  --   --    < >  --  8.7* 9.0*  --   --  9.5*   HEMATOCRIT % 25.1* 25.8* 26.4*   < >  --   --    < >  --  27.3* 28.1*  --   --  29.4*   MCV fL 96.2 101.6* 96.0   < >  --   --    < >  --  101.5* 99.3*  --   --  97.7*   MCHC g/dL 33.5 31.8 33.3   < >  --   --    < >  --  31.9 32.0  --   --  32.3   PLATELETS 10*3/mm3 42*  32* 41*   < >  --   --    < >  --  43* 47*  --   --  56*   INR   --   --   --   --   --   --   --   --   --   --   --  1.12*  --     < > = values in this interval not displayed.     Results from last 7 days   Lab Units 11/16/22  0012 11/15/22  0019 11/14/22  1435 11/14/22  0820 11/12/22  0433 11/11/22  0648 11/11/22  0045 11/10/22  1433   SODIUM mmol/L 131* 130*  --  130*   < > 130* 132* 133*   POTASSIUM mmol/L 3.8 3.8  --  3.8   < > 5.6* 4.4 5.2   MAGNESIUM mg/dL  --  1.5* 1.5*  --   --   --   --   --    CHLORIDE mmol/L 96* 97*  --  97*   < > 96* 95* 94*   CO2 mmol/L 25.4 24.3  --  24.5   < > 18.2* 21.0* 25.9   BUN mg/dL 40* 44*  --  49*   < > 69* 68* 67*   CREATININE mg/dL 1.99* 2.26*  --  2.50*   < > 4.30* 4.53* 4.41*   CALCIUM mg/dL 7.8* 7.6*  --  8.1*   < > 8.5* 8.8 9.1   GLUCOSE mg/dL 143* 118*  --  130*   < > 112* 125* 114*   ALBUMIN g/dL  --   --   --   --   --  1.58* 2.11* 2.68*   BILIRUBIN mg/dL  --   --   --   --   --  0.7 0.7 0.8   ALK PHOS U/L  --   --   --   --   --  101 116 134*   AST (SGOT) U/L  --   --   --   --   --  34* 25 22   ALT (SGPT) U/L  --   --   --   --   --  10 11 9    < > = values in this interval not displayed.   Estimated Creatinine Clearance: 76.8 mL/min (A) (by C-G formula based on SCr of 1.99 mg/dL (H)).  No results found for: AMMONIA    No results found for: HGBA1C, POCGLU  Lab Results   Component Value Date    HGBA1C 4.70 (L) 11/10/2022     Lab Results   Component Value Date    TSH 0.960 11/10/2022       Blood Culture   Date Value Ref Range Status   11/10/2022 No growth at 24 hours  Preliminary   11/10/2022 No growth at 24 hours  Preliminary     No results found for: URINECX  No results found for: WOUNDCX  No results found for: STOOLCX  No results found for: RESPCX  Pain Management Panel     Pain Management Panel Latest Ref Rng & Units 11/11/2022    AMPHETAMINES SCREEN, URINE Negative Negative    BARBITURATES SCREEN Negative Negative    BENZODIAZEPINE SCREEN, URINE Negative  Negative    BUPRENORPHINEUR Negative Negative    COCAINE SCREEN, URINE Negative Negative    METHADONE SCREEN, URINE Negative Negative    METHAMPHETAMINEUR Negative Negative            ----------------------------------------------------------------------------------------------------------------------  Imaging Results (Last 24 Hours)     Procedure Component Value Units Date/Time    US Abdomen Complete [[id number]] Collected: 11/16/22 0808     Updated: 11/16/22 0811    Narrative:      EXAM:    US Abdomen Complete     EXAM DATE:    11/16/2022 12:04 AM     CLINICAL HISTORY:    assess for hepato-splenomegaly; N19-Unspecified kidney failure     TECHNIQUE:    Real-time ultrasound of the abdomen with image documentation.     COMPARISON:    No relevant prior studies available.     FINDINGS:    Liver:  Unremarkable.  No mass.  No intrahepatic bile duct dilation.          Kidneys:  Right kidney measures 10.64 cm .  Left kidney measures 8.74  cm.  No stones.  No hydronephrosis.    Spleen:    The spleen measures 13.1 cm in maximum dimension.    Aorta:  Unremarkable.  No aneurysm.    Inferior vena cava:  Unremarkable.       Impression:        The spleen measures 13.1 cm in maximum dimension. Probably within  normal limits for size.     This report was finalized on 11/16/2022 8:09 AM by Dr. Ryan Whitney MD.       XR Chest 1 View [[id number]] Collected: 11/16/22 0807     Updated: 11/16/22 0810    Narrative:      EXAM:    XR Chest, 1 View     EXAM DATE:    11/16/2022 7:51 AM     CLINICAL HISTORY:    worsening cough and congestion, increasing dyspnea; N19-Unspecified  kidney failure     TECHNIQUE:    Frontal view of the chest.     COMPARISON:    11/10/2022     FINDINGS:    LUNGS:  Some improved aeration specifically of the left lung base.   Atelectasis in the lung bases.    PLEURAL SPACE:  Tiny bilateral pleural effusions.  No pneumothorax.    HEART:  Cardiomegaly again noted.    MEDIASTINUM:  Unremarkable.    BONES/JOINTS:   Unremarkable.    TUBES, LINES AND DEVICES:  Right PICC with tip in SVC.       Impression:      1.  Some improved aeration specifically of the left lung base.  2.  Tiny bilateral pleural effusions.     This report was finalized on 11/16/2022 8:08 AM by Dr. Ryan Whitney MD.             ----------------------------------------------------------------------------------------------------------------------    Pertinent Infectious Disease Results    Lactic acid 5.0 on admission.  Procalcitonin 0.87.  Urinalysis unremarkable.  COVID-19 and influenza PCR negative.  Blood cultures from 11/10/2022 show no growth thus far.  CT of the abdomen pelvis from 11/10/2022 reports no acute intra-abdominal abnormality, mildly nodular contour of the liver may represent early cirrhosis, borderline splenomegaly, CT of the chest from 11/10/2022 reports no acute cardiopulmonary disease, bibasilar subsegmental atelectasis, small hiatal hernia.  Right lower extremity CT reports advanced cellulitis of the right lower extremity extending from the level of the proximal thigh through the foot with maximum thickness of the inflammatory change and stranding medially measuring greater than 15 cm at the level of the distal thigh and knee, no drainable fluid collection no subcutaneous gas, no focal erosive changes to suggest osteomyelitis at this time.      GI PCR negative.  Urinalysis from 11/11/2022 unremarkable.  Right lower extremity venous duplex negative for DVT.  Right lower extremity erythema improved but continues with significant edema.    Patient sitting up in bed this morning.  Reports her chest is hurting this morning it feels hot.  WBC slightly elevated 12.16.  Currently on room air with no apparent distress.  Afebrile, denies diarrhea.  Chest x-ray from 11/16/2022 reports improved aeration specifically of the left lung base, tiny bilateral pleural effusions.      Based on previous culture with MRSA from joint aspiration at outside  facility recommend to continue vancomycin monotherapy.  Patient will require prolonged course of antibiotic therapy for chronic osteomyelitis of the right knee secondary to complication from recurrent partially treated right knee septic arthritis and will need prolonged course of IV antibiotic therapy x8 weeks followed by oral suppressive therapy with very grim prognosis regarding clearing the infection without repetitive washout and aspiration.  Per orthopedic surgery risk outweighs benefit for surgical intervention at this time.  We will continue to follow closely and adjust antibiotic therapy as needed.    Assessment/Plan       Assessment     Septic shock with lactic acid greater than 4 on admission  History of right knee pyogenic arthritis with MRSA with superimposed right knee chronic osteomyelitis  Right lower extremity cellulitis and lymphedema        Plan      Based on previous cultures with MRSA from her joint aspiration at outside facility patient continues on vancomycin monotherapy and will require very prolonged course of antibiotic therapy x8 weeks followed by oral suppressive therapy with guarded prognosis regarding clearing the infection completely due to the lack of repetitive washout and aspiration.  Per orthopedic surgery the risk of such procedure outweighs the benefit.  Overall patient seems to be stable from infectious disease standpoint and overall improving pain but continues with significant edema/lymphedema and persistent erythema mostly around the knee and right foot.      Code Status:   Code Status and Medical Interventions:   Ordered at: 11/10/22 1939     Level Of Support Discussed With:    Patient     Code Status (Patient has no pulse and is not breathing):    CPR (Attempt to Resuscitate)     Medical Interventions (Patient has pulse or is breathing):    Full Support       RUKHSANA Kennedy  11/16/22  11:11 EST     Electronically signed by RUKHSANA Kennedy, 11/16/22, 11:12 AM  EST.    Electronically signed by Kane Winter MD, 11/16/22, 12:18 PM EST.

## 2022-11-16 NOTE — PROGRESS NOTES
[facility] HOSPITALIST PROGRESS NOTE     Patient Identification:  Name:  Malina Velasquez  Age:  57 y.o.  Sex:  female  :  1965  MRN:  [id number]  Visit Number:  [id number]  ROOM: [address]     Primary Care Provider:  Jeremiah Topete MD    Length of stay in inpatient status:  6    Subjective     Chief Compliant:    Chief Complaint   Patient presents with   • Weakness - Generalized     Pt states dr sent her here for high wbc and kidney function        History of Presenting Illness: Patient seen and evaluated in follow-up for severe sepsis secondary to right lower extremity cellulitis with history of MRSA right knee infection with pyogenic arthritis and SUZETTE on CKD.  Patient today with reported chest pain overnight but reproducible on exam and she feels it is muscular in nature as well. Still with cough, CXR looks improved from admission.    Objective     Current Hospital Meds:  fluticasone, 2 spray, Nasal, Daily  folic acid, 500 mcg, Oral, Daily  heparin (porcine), 5,000 Units, Subcutaneous, Q12H  levothyroxine, 100 mcg, Oral, Daily  loperamide, 2 mg, Oral, Once  metoprolol tartrate, 12.5 mg, Oral, Q12H  nystatin, 1 application, Topical, BID  oxyCODONE, 15 mg, Oral, 4x Daily  pantoprazole, 40 mg, Oral, QAM  sodium chloride, 10 mL, Intravenous, Q12H  sodium chloride, 10 mL, Intravenous, Q12H  sodium chloride, 10 mL, Intravenous, Q12H  sodium chloride, 10 mL, Intravenous, Q12H  vancomycin, 1,250 mg, Intravenous, Q24H  zinc oxide, , Topical, BID         ----------------------------------------------------------------------------------------------------------------------  Vital Signs:  Temp:  [97.9 °F (36.6 °C)-98.7 °F (37.1 °C)] 97.9 °F (36.6 °C)  Heart Rate:  [] 84  Resp:  [11-18] 12  BP: ()/(50-97) 97/50  SpO2:  [93 %-100 %] 96 %  on   ;   Device (Oxygen Therapy): room air  Body mass index is 91.23 kg/m².      Intake/Output Summary (Last 24 hours) at 2022 1453  Last data  filed at 11/16/2022 1400  Gross per 24 hour   Intake 360 ml   Output 2100 ml   Net -1740 ml      ----------------------------------------------------------------------------------------------------------------------  Physical exam:  Constitutional: Morbidly obese chronically ill-appearing female resting in bed, NAD  HENT:  Head:  Normocephalic and atraumatic.  Mouth:  Moist mucous membranes.    Eyes:  Conjunctivae and EOM are normal. No scleral icterus.    Cardiovascular:  Normal rate, regular rhythm and normal heart sounds with no murmur.  Pulmonary/Chest:  No respiratory distress, no wheezes, no crackles, with normal breath sounds and good air movement.  Abdominal:  Soft.  Bowel sounds are normal.  No distension and no tenderness.   Musculoskeletal: Tenderness to palpation of the right lower extremity with out deformity.  No red or swollen joints anywhere.  Functional ROM intact.   Neurological:  Alert and oriented to person, place, and time.  No cranial nerve deficit.  No tongue deviation.  No facial droop.  No slurred speech.   Skin:  Skin is warm and dry.  Mild erythema of the very distal end of the right lower extremity abutting the dorsum of the right foot with some erythema present there as well.  Peripheral vascular:  Pulses in all 4 extremities with no clubbing, no cyanosis, bilateral extensive lymphedema present, right greater than left.  Psychiatric: Appropriate mood and affect, pleasant.   ----------------------------------------------------------------------------------------------------------------------  WBC/HGB/HCT/PLT   12.16/8.4/25.1/42 (11/16 0012)  BUN/CREAT/GLUC/ALT/AST/LISSETT/LIP    40/1.99/143/--/--/--/-- (11/16 0012)  LYTES - Na/K/Cl/CO2: 131*/3.8/96*/25.4 (11/16 0012)        No results found for: URINECX  Blood Culture   Date Value Ref Range Status   11/10/2022 No growth at 4 days  Preliminary   11/10/2022 No growth at 4 days  Preliminary       I have personally looked at the labs and they are  summarized above.  ----------------------------------------------------------------------------------------------------------------------  Detailed radiology reports for the last 24 hours:  US Abdomen Complete    Result Date: 11/16/2022    The spleen measures 13.1 cm in maximum dimension. Probably within normal limits for size.  This report was finalized on 11/16/2022 8:09 AM by Dr. Ryan Whitney MD.      XR Chest 1 View    Result Date: 11/16/2022  1.  Some improved aeration specifically of the left lung base. 2.  Tiny bilateral pleural effusions.  This report was finalized on 11/16/2022 8:08 AM by Dr. Ryan Whitney MD.      Assessment & Plan      Severe sepsis  Septic shock per CMS guidelines  Lactic acidemia  Right lower extremity cellulitis and lymphedema  Right knee chronic osteomyelitis    -Patient seen and evaluated by orthopedic surgery who feels risk of surgery outweighs benefit for any intervention at this time.    -Infectious disease consulted and following along and recommending 8-week course of antibiotic therapy with vancomycin followed by oral suppressive therapy.    -Consult placed for PICC line placement for above prolonged antibiotic therapy and successfully placed 11/15/22    -Continue Ace wrappings to lower extremities to help with lymphedema.    SUZETTE on CKD    -Creatinine 4.4 admission improved with supportive care and antibiotic therapy and fluids.  Improved to 1.99 today.    -Nephrology consulting on along and worried about possibility of HUS with her anemia and thrombocytopenia and request hematology consultation.  Has signed off today.    -Hematology consulted and seen and evaluated patient and agrees no evidence of HUS or TTP given lack of evidence of hemolysis on peripheral smear as well as normal normal LDH and haptoglobin.  ADAMTS 13 reduced but not severe enough to be consistent with hemolysis.     Chronic anemia  Chronic thrombocytopenia  Chronic liver cirrhosis  Chronic hepatitis C     -Patient with history of cirrhosis which would be consistent with patient's cytopenias.    -Hematology/oncology consulted and recommendations as above.    -Started on folate supplementation     -Platelets improved, continue to monitor    Chronic HFpEF    -TTE with grade 1 diastolic dysfunction, mild LV hypertrophy and mild aortic valve stenosis.    -CXR still with evidence of volume, will resume diuretics cautiously given recovering SUZETTE.    Essential hypertension    -Continue home metoprolol    Hypothyroidism    -Continue home levothyroxine      Copied text in portions of the note has been reviewed and is accurate as of 11/16/22    VTE Prophylaxis:   Mechanical Order History:     None      Pharmalogical Order History:      Ordered     Dose Route Frequency Stop    11/10/22 4812  heparin (porcine) 5000 UNIT/ML injection 5,000 Units         5,000 Units SC Every 12 Hours Scheduled --                Disposition home with IV antibiotic therapy in 24 to 48 hours pending diuretics challenge.     Michael Roman DO  [facility]  11/16/22  14:53 EST

## 2022-11-16 NOTE — PLAN OF CARE
Goal Outcome Evaluation:  Plan of Care Reviewed With: patient        Progress: no change  Outcome Evaluation: PT resting in bed at this time. PT has had persistent loose cough throughout shift. PRN medication provider. PT has been encouraged to reposition and turn Q2 hours. PT has had some complaints of nausea. PRN medication also provided. VSS. Afebrile. Will Continue to Monitor PT.      Problem: Skin Injury Risk Increased  Goal: Skin Health and Integrity  Outcome: Ongoing, Progressing  Intervention: Promote and Optimize Oral Intake  Recent Flowsheet Documentation  Taken 11/16/2022 0245 by Ronna Crawford RN  Oral Nutrition Promotion: rest periods promoted  Taken 11/15/2022 2015 by Ronna Crawford RN  Oral Nutrition Promotion: rest periods promoted  Intervention: Optimize Skin Protection  Recent Flowsheet Documentation  Taken 11/16/2022 0245 by Ronna Crawford RN  Pressure Reduction Techniques:   weight shift assistance provided   frequent weight shift encouraged  Head of Bed (HOB) Positioning: HOB at 30-45 degrees  Pressure Reduction Devices: pressure-redistributing mattress utilized  Skin Protection:   adhesive use limited   drying agents applied  Taken 11/15/2022 2015 by Ronna Crawford RN  Pressure Reduction Techniques:   frequent weight shift encouraged   weight shift assistance provided  Head of Bed (HOB) Positioning: HOB at 30-45 degrees  Pressure Reduction Devices: pressure-redistributing mattress utilized  Skin Protection:   adhesive use limited   drying agents applied     Problem: Asthma Comorbidity  Goal: Maintenance of Asthma Control  Outcome: Ongoing, Progressing  Intervention: Maintain Asthma Symptom Control  Recent Flowsheet Documentation  Taken 11/16/2022 0245 by Ronna Crawford, RN  Medication Review/Management: medications reviewed  Taken 11/16/2022 0100 by Ronna Crawford RN  Medication Review/Management: medications reviewed  Taken 11/15/2022 2300 by Ronna Crawford, RN  Medication  Review/Management: medications reviewed  Taken 11/15/2022 2100 by Ronna Crawford RN  Medication Review/Management: medications reviewed  Taken 11/15/2022 2015 by Ronna Crawford RN  Medication Review/Management: medications reviewed  Taken 11/15/2022 1900 by Ronna Crawford RN  Medication Review/Management: medications reviewed     Problem: Heart Failure Comorbidity  Goal: Maintenance of Heart Failure Symptom Control  Outcome: Ongoing, Progressing  Intervention: Maintain Heart Failure-Management  Recent Flowsheet Documentation  Taken 11/16/2022 0245 by Ronna Crawford RN  Medication Review/Management: medications reviewed  Taken 11/16/2022 0100 by Ronna Crawford RN  Medication Review/Management: medications reviewed  Taken 11/15/2022 2300 by Ronna Crawford RN  Medication Review/Management: medications reviewed  Taken 11/15/2022 2100 by Ronna Crawford RN  Medication Review/Management: medications reviewed  Taken 11/15/2022 2015 by Ronna Crawford RN  Medication Review/Management: medications reviewed  Taken 11/15/2022 1900 by Ronna Crawford RN  Medication Review/Management: medications reviewed     Problem: Hypertension Comorbidity  Goal: Blood Pressure in Desired Range  Outcome: Ongoing, Progressing  Intervention: Maintain Blood Pressure Management  Recent Flowsheet Documentation  Taken 11/16/2022 0245 by Ronna Crawford RN  Medication Review/Management: medications reviewed  Taken 11/16/2022 0100 by Ronna Crawford RN  Medication Review/Management: medications reviewed  Taken 11/15/2022 2300 by Ronna Crawford RN  Medication Review/Management: medications reviewed  Taken 11/15/2022 2100 by Ronna Crawford RN  Medication Review/Management: medications reviewed  Taken 11/15/2022 2015 by Ronna Crawford, RN  Medication Review/Management: medications reviewed  Taken 11/15/2022 1900 by Ronna Crawford RN  Medication Review/Management: medications reviewed     Problem: Obstructive Sleep Apnea Risk or  Actual Comorbidity Management  Goal: Unobstructed Breathing During Sleep  Outcome: Ongoing, Progressing     Problem: Osteoarthritis Comorbidity  Goal: Maintenance of Osteoarthritis Symptom Control  Outcome: Ongoing, Progressing  Intervention: Maintain Osteoarthritis Symptom Control  Recent Flowsheet Documentation  Taken 11/16/2022 0245 by Ronna Crawford RN  Activity Management: activity adjusted per tolerance  Medication Review/Management: medications reviewed  Taken 11/16/2022 0100 by Ronna Crawford RN  Medication Review/Management: medications reviewed  Taken 11/15/2022 2300 by Ronna Crawford RN  Medication Review/Management: medications reviewed  Taken 11/15/2022 2100 by Ronna Crawford RN  Medication Review/Management: medications reviewed  Taken 11/15/2022 2015 by Ronna Crawford RN  Activity Management:   activity adjusted per tolerance   bedrest  Medication Review/Management: medications reviewed  Taken 11/15/2022 1900 by Ronna Crawford RN  Medication Review/Management: medications reviewed     Problem: Pain Chronic (Persistent) (Comorbidity Management)  Goal: Acceptable Pain Control and Functional Ability  Outcome: Ongoing, Progressing  Intervention: Manage Persistent Pain  Recent Flowsheet Documentation  Taken 11/16/2022 0245 by Ronna Crawford RN  Sleep/Rest Enhancement:   awakenings minimized   consistent schedule promoted   regular sleep/rest pattern promoted   relaxation techniques promoted   room darkened  Medication Review/Management: medications reviewed  Taken 11/16/2022 0100 by Ronna Crawford RN  Medication Review/Management: medications reviewed  Taken 11/15/2022 2300 by Ronna Crawford RN  Medication Review/Management: medications reviewed  Taken 11/15/2022 2100 by Ronna Crawford RN  Medication Review/Management: medications reviewed  Taken 11/15/2022 2015 by Ronna Crawford RN  Sleep/Rest Enhancement:   awakenings minimized   consistent schedule promoted   regular sleep/rest  pattern promoted   relaxation techniques promoted   room darkened  Medication Review/Management: medications reviewed  Taken 11/15/2022 1900 by Ronna Crawford RN  Medication Review/Management: medications reviewed  Intervention: Optimize Psychosocial Wellbeing  Recent Flowsheet Documentation  Taken 11/16/2022 0245 by Ronna Crawford RN  Supportive Measures:   active listening utilized   counseling provided   relaxation techniques promoted   self-care encouraged  Diversional Activities:   television   smartphone  Family/Support System Care:   support provided   self-care encouraged  Taken 11/15/2022 2015 by Ronna Crawford RN  Supportive Measures:   active listening utilized   counseling provided   relaxation techniques promoted   self-care encouraged  Diversional Activities:   television   smartphone  Family/Support System Care:   support provided   self-care encouraged     Problem: Fall Injury Risk  Goal: Absence of Fall and Fall-Related Injury  Outcome: Ongoing, Progressing  Intervention: Identify and Manage Contributors  Recent Flowsheet Documentation  Taken 11/16/2022 0245 by Ronna Crawford RN  Medication Review/Management: medications reviewed  Self-Care Promotion:   independence encouraged   BADL personal objects within reach   BADL personal routines maintained  Taken 11/16/2022 0100 by Ronna Crawford, RN  Medication Review/Management: medications reviewed  Taken 11/15/2022 2300 by Ronna Crawford, RN  Medication Review/Management: medications reviewed  Taken 11/15/2022 2100 by Ronna Crawford RN  Medication Review/Management: medications reviewed  Taken 11/15/2022 2015 by Ronna Crawford, RN  Medication Review/Management: medications reviewed  Self-Care Promotion:   independence encouraged   BADL personal objects within reach   BADL personal routines maintained  Taken 11/15/2022 1900 by Ronna Crawford, RN  Medication Review/Management: medications reviewed  Intervention: Promote Injury-Free  Environment  Recent Flowsheet Documentation  Taken 11/16/2022 0245 by Ronna Crawford, RN  Safety Promotion/Fall Prevention:   activity supervised   assistive device/personal items within reach   clutter free environment maintained   fall prevention program maintained   nonskid shoes/slippers when out of bed   room organization consistent   safety round/check completed  Taken 11/16/2022 0100 by Ronna Crawford, RN  Safety Promotion/Fall Prevention:   activity supervised   assistive device/personal items within reach   clutter free environment maintained   fall prevention program maintained   nonskid shoes/slippers when out of bed   safety round/check completed   room organization consistent  Taken 11/15/2022 2300 by Ronna Crawford, RN  Safety Promotion/Fall Prevention:   activity supervised   assistive device/personal items within reach   clutter free environment maintained   fall prevention program maintained   nonskid shoes/slippers when out of bed   safety round/check completed   room organization consistent  Taken 11/15/2022 2100 by Ronna Crawford, RN  Safety Promotion/Fall Prevention:   activity supervised   assistive device/personal items within reach   clutter free environment maintained   fall prevention program maintained   nonskid shoes/slippers when out of bed   room organization consistent   safety round/check completed  Taken 11/15/2022 2015 by Ronna Crawford, RN  Safety Promotion/Fall Prevention:   activity supervised   assistive device/personal items within reach   clutter free environment maintained   fall prevention program maintained   nonskid shoes/slippers when out of bed   room organization consistent   safety round/check completed  Taken 11/15/2022 1900 by Ronna Crawford, RN  Safety Promotion/Fall Prevention:   activity supervised   assistive device/personal items within reach   clutter free environment maintained   fall prevention program maintained   nonskid shoes/slippers when out of  bed   room organization consistent   safety round/check completed     Problem: Adult Inpatient Plan of Care  Goal: Plan of Care Review  Outcome: Ongoing, Progressing  Flowsheets  Taken 11/16/2022 0308 by Ronna Crawford RN  Plan of Care Reviewed With: patient  Outcome Evaluation: PT resting in bed at this time. PT has had persistent loose cough throughout shift. PRN medication provider. PT has been encouraged to reposition and turn Q2 hours. PT has had some complaints of nausea. PRN medication also provided. VSS. Afebrile. Will Continue to Monitor PT.  Taken 11/15/2022 1744 by Lachelle Marino RN  Progress: no change  Goal: Patient-Specific Goal (Individualized)  Outcome: Ongoing, Progressing  Goal: Absence of Hospital-Acquired Illness or Injury  Outcome: Ongoing, Progressing  Intervention: Identify and Manage Fall Risk  Recent Flowsheet Documentation  Taken 11/16/2022 0245 by Ronna Crawford, RN  Safety Promotion/Fall Prevention:   activity supervised   assistive device/personal items within reach   clutter free environment maintained   fall prevention program maintained   nonskid shoes/slippers when out of bed   room organization consistent   safety round/check completed  Taken 11/16/2022 0100 by Ronna Crawford, RN  Safety Promotion/Fall Prevention:   activity supervised   assistive device/personal items within reach   clutter free environment maintained   fall prevention program maintained   nonskid shoes/slippers when out of bed   safety round/check completed   room organization consistent  Taken 11/15/2022 2300 by Ronna Crawford, RN  Safety Promotion/Fall Prevention:   activity supervised   assistive device/personal items within reach   clutter free environment maintained   fall prevention program maintained   nonskid shoes/slippers when out of bed   safety round/check completed   room organization consistent  Taken 11/15/2022 2100 by Ronna Crawford, RN  Safety Promotion/Fall Prevention:   activity supervised    assistive device/personal items within reach   clutter free environment maintained   fall prevention program maintained   nonskid shoes/slippers when out of bed   room organization consistent   safety round/check completed  Taken 11/15/2022 2015 by Ronna Crawford RN  Safety Promotion/Fall Prevention:   activity supervised   assistive device/personal items within reach   clutter free environment maintained   fall prevention program maintained   nonskid shoes/slippers when out of bed   room organization consistent   safety round/check completed  Taken 11/15/2022 1900 by Ronna Crawford RN  Safety Promotion/Fall Prevention:   activity supervised   assistive device/personal items within reach   clutter free environment maintained   fall prevention program maintained   nonskid shoes/slippers when out of bed   room organization consistent   safety round/check completed  Intervention: Prevent Skin Injury  Recent Flowsheet Documentation  Taken 11/16/2022 0245 by Ronna Crawford RN  Body Position:   right   side-lying  Skin Protection:   adhesive use limited   drying agents applied  Taken 11/15/2022 2015 by Ronna Crawford RN  Body Position: (PT encouraged to turn and reposition Q2 hours. PT stated she was comfortable this round and will turn later.)   weight shifting   patient/family refused  Skin Protection:   adhesive use limited   drying agents applied  Intervention: Prevent and Manage VTE (Venous Thromboembolism) Risk  Recent Flowsheet Documentation  Taken 11/16/2022 0245 by Ronna Crawford RN  Activity Management: activity adjusted per tolerance  Taken 11/15/2022 2015 by Ronna Crawford RN  Activity Management:   activity adjusted per tolerance   bedrest  VTE Prevention/Management: (See MAR) other (see comments)  Intervention: Prevent Infection  Recent Flowsheet Documentation  Taken 11/16/2022 0245 by Ronna Crawford RN  Infection Prevention: rest/sleep promoted  Taken 11/16/2022 0100 by Ronna Crawford  RN  Infection Prevention: rest/sleep promoted  Taken 11/15/2022 2300 by Ronna Crawford RN  Infection Prevention: rest/sleep promoted  Taken 11/15/2022 2100 by Ronna Crawford RN  Infection Prevention: rest/sleep promoted  Taken 11/15/2022 2015 by Ronna Crawford RN  Infection Prevention: rest/sleep promoted  Taken 11/15/2022 1900 by Ronna Crawford RN  Infection Prevention: rest/sleep promoted  Goal: Optimal Comfort and Wellbeing  Outcome: Ongoing, Progressing  Intervention: Provide Person-Centered Care  Recent Flowsheet Documentation  Taken 11/16/2022 0245 by Ronna Crawford, RN  Trust Relationship/Rapport:   choices provided   care explained   empathic listening provided   questions answered   reassurance provided   thoughts/feelings acknowledged  Taken 11/15/2022 2015 by Ronna Crawford, RN  Trust Relationship/Rapport:   care explained   choices provided   empathic listening provided   questions answered   reassurance provided   thoughts/feelings acknowledged  Goal: Readiness for Transition of Care  Outcome: Ongoing, Progressing

## 2022-11-17 LAB
ANION GAP SERPL CALCULATED.3IONS-SCNC: 7.4 MMOL/L (ref 5–15)
BUN SERPL-MCNC: 35 MG/DL (ref 6–20)
BUN/CREAT SERPL: 18.7 (ref 7–25)
CALCIUM SPEC-SCNC: 7.9 MG/DL (ref 8.6–10.5)
CHLORIDE SERPL-SCNC: 97 MMOL/L (ref 98–107)
CO2 SERPL-SCNC: 27.6 MMOL/L (ref 22–29)
CREAT SERPL-MCNC: 1.87 MG/DL (ref 0.57–1)
DEPRECATED RDW RBC AUTO: 51.1 FL (ref 37–54)
EGFRCR SERPLBLD CKD-EPI 2021: 31.1 ML/MIN/1.73
ERYTHROCYTE [DISTWIDTH] IN BLOOD BY AUTOMATED COUNT: 15.2 % (ref 12.3–15.4)
GLUCOSE SERPL-MCNC: 120 MG/DL (ref 65–99)
HCT VFR BLD AUTO: 24.3 % (ref 34–46.6)
HGB BLD-MCNC: 8.2 G/DL (ref 12–15.9)
MAGNESIUM SERPL-MCNC: 1.9 MG/DL (ref 1.6–2.6)
MCH RBC QN AUTO: 32.5 PG (ref 26.6–33)
MCHC RBC AUTO-ENTMCNC: 33.7 G/DL (ref 31.5–35.7)
MCV RBC AUTO: 96.4 FL (ref 79–97)
METHYLMALONATE SERPL-SCNC: 375 NMOL/L (ref 0–378)
PLATELET # BLD AUTO: 56 10*3/MM3 (ref 140–450)
PMV BLD AUTO: 9.2 FL (ref 6–12)
POTASSIUM SERPL-SCNC: 4.1 MMOL/L (ref 3.5–5.2)
RBC # BLD AUTO: 2.52 10*6/MM3 (ref 3.77–5.28)
SODIUM SERPL-SCNC: 132 MMOL/L (ref 136–145)
WBC NRBC COR # BLD: 14.19 10*3/MM3 (ref 3.4–10.8)

## 2022-11-17 PROCEDURE — 25010000002 HEPARIN (PORCINE) PER 1000 UNITS: Performed by: HOSPITALIST

## 2022-11-17 PROCEDURE — 99239 HOSP IP/OBS DSCHRG MGMT >30: CPT | Performed by: STUDENT IN AN ORGANIZED HEALTH CARE EDUCATION/TRAINING PROGRAM

## 2022-11-17 PROCEDURE — 83735 ASSAY OF MAGNESIUM: CPT

## 2022-11-17 PROCEDURE — 80048 BASIC METABOLIC PNL TOTAL CA: CPT | Performed by: STUDENT IN AN ORGANIZED HEALTH CARE EDUCATION/TRAINING PROGRAM

## 2022-11-17 PROCEDURE — 99232 SBSQ HOSP IP/OBS MODERATE 35: CPT | Performed by: INTERNAL MEDICINE

## 2022-11-17 PROCEDURE — 85027 COMPLETE CBC AUTOMATED: CPT | Performed by: STUDENT IN AN ORGANIZED HEALTH CARE EDUCATION/TRAINING PROGRAM

## 2022-11-17 PROCEDURE — 25010000002 MORPHINE PER 10 MG: Performed by: HOSPITALIST

## 2022-11-17 PROCEDURE — 25010000002 VANCOMYCIN 5 G RECONSTITUTED SOLUTION: Performed by: INTERNAL MEDICINE

## 2022-11-17 RX ORDER — BUMETANIDE 1 MG/1
2 TABLET ORAL DAILY
Status: DISCONTINUED | OUTPATIENT
Start: 2022-11-17 | End: 2022-11-18 | Stop reason: HOSPADM

## 2022-11-17 RX ORDER — BENZONATATE 100 MG/1
100 CAPSULE ORAL EVERY 4 HOURS PRN
Qty: 20 CAPSULE | Refills: 0 | Status: SHIPPED | OUTPATIENT
Start: 2022-11-17

## 2022-11-17 RX ORDER — FOLIC ACID 1 MG/1
500 TABLET ORAL DAILY
Qty: 15 TABLET | Refills: 0 | Status: SHIPPED | OUTPATIENT
Start: 2022-11-18 | End: 2022-12-18

## 2022-11-17 RX ORDER — BUMETANIDE 2 MG/1
2 TABLET ORAL DAILY
Start: 2022-11-17

## 2022-11-17 RX ORDER — OXYCODONE HYDROCHLORIDE 10 MG/1
10 TABLET ORAL 3 TIMES DAILY PRN
Qty: 10 TABLET | Refills: 0 | Status: SHIPPED | OUTPATIENT
Start: 2022-11-17

## 2022-11-17 RX ADMIN — GUAIFENESIN AND DEXTROMETHORPHAN 5 ML: 100; 10 SYRUP ORAL at 06:33

## 2022-11-17 RX ADMIN — Medication 10 ML: at 21:00

## 2022-11-17 RX ADMIN — MORPHINE SULFATE 2 MG: 2 INJECTION, SOLUTION INTRAMUSCULAR; INTRAVENOUS at 05:16

## 2022-11-17 RX ADMIN — MORPHINE SULFATE 2 MG: 2 INJECTION, SOLUTION INTRAMUSCULAR; INTRAVENOUS at 14:47

## 2022-11-17 RX ADMIN — MORPHINE SULFATE 2 MG: 2 INJECTION, SOLUTION INTRAMUSCULAR; INTRAVENOUS at 10:10

## 2022-11-17 RX ADMIN — FLUTICASONE PROPIONATE 2 SPRAY: 50 SPRAY, METERED NASAL at 08:23

## 2022-11-17 RX ADMIN — MORPHINE SULFATE 2 MG: 2 INJECTION, SOLUTION INTRAMUSCULAR; INTRAVENOUS at 22:10

## 2022-11-17 RX ADMIN — HEPARIN SODIUM 5000 UNITS: 5000 INJECTION INTRAVENOUS; SUBCUTANEOUS at 08:17

## 2022-11-17 RX ADMIN — METOPROLOL TARTRATE 12.5 MG: 25 TABLET, FILM COATED ORAL at 08:18

## 2022-11-17 RX ADMIN — Medication 10 ML: at 08:24

## 2022-11-17 RX ADMIN — PANTOPRAZOLE SODIUM 40 MG: 40 TABLET, DELAYED RELEASE ORAL at 06:33

## 2022-11-17 RX ADMIN — OXYCODONE HYDROCHLORIDE 15 MG: 15 TABLET ORAL at 11:52

## 2022-11-17 RX ADMIN — OXYCODONE HYDROCHLORIDE 15 MG: 15 TABLET ORAL at 20:19

## 2022-11-17 RX ADMIN — LEVOTHYROXINE SODIUM 100 MCG: 100 TABLET ORAL at 08:19

## 2022-11-17 RX ADMIN — HEPARIN SODIUM 5000 UNITS: 5000 INJECTION INTRAVENOUS; SUBCUTANEOUS at 20:19

## 2022-11-17 RX ADMIN — Medication 10 ML: at 08:25

## 2022-11-17 RX ADMIN — BENZONATATE 100 MG: 100 CAPSULE ORAL at 11:50

## 2022-11-17 RX ADMIN — BENZONATATE 100 MG: 100 CAPSULE ORAL at 20:22

## 2022-11-17 RX ADMIN — MORPHINE SULFATE 2 MG: 2 INJECTION, SOLUTION INTRAMUSCULAR; INTRAVENOUS at 01:21

## 2022-11-17 RX ADMIN — GUAIFENESIN AND DEXTROMETHORPHAN 5 ML: 100; 10 SYRUP ORAL at 14:47

## 2022-11-17 RX ADMIN — ZINC OXIDE: 200 OINTMENT TOPICAL at 08:25

## 2022-11-17 RX ADMIN — GUAIFENESIN AND DEXTROMETHORPHAN 5 ML: 100; 10 SYRUP ORAL at 20:22

## 2022-11-17 RX ADMIN — BENZONATATE 100 MG: 100 CAPSULE ORAL at 02:42

## 2022-11-17 RX ADMIN — GUAIFENESIN AND DEXTROMETHORPHAN 5 ML: 100; 10 SYRUP ORAL at 02:37

## 2022-11-17 RX ADMIN — VANCOMYCIN HYDROCHLORIDE 1250 MG: 5 INJECTION, POWDER, LYOPHILIZED, FOR SOLUTION INTRAVENOUS at 18:26

## 2022-11-17 RX ADMIN — BUMETANIDE 2 MG: 1 TABLET ORAL at 14:47

## 2022-11-17 RX ADMIN — OXYCODONE HYDROCHLORIDE 15 MG: 15 TABLET ORAL at 18:26

## 2022-11-17 RX ADMIN — Medication 500 MCG: at 08:18

## 2022-11-17 RX ADMIN — BENZONATATE 100 MG: 100 CAPSULE ORAL at 08:17

## 2022-11-17 RX ADMIN — OXYCODONE HYDROCHLORIDE 15 MG: 15 TABLET ORAL at 08:18

## 2022-11-17 RX ADMIN — NYSTATIN 1 APPLICATION: 100000 CREAM TOPICAL at 08:25

## 2022-11-17 NOTE — NURSING NOTE
Patient arrived to the floor, bariatric bed, A&O, stable on RA, no c/o, no s/s of distress noted, Piccline TSERING in place, IV vancomycin infusing.

## 2022-11-17 NOTE — PROGRESS NOTES
PROGRESS NOTE         Patient Identification:  Name:  Malina Velasquez  Age:  57 y.o.  Sex:  female  :  1965  MRN:  [id number]  Visit Number:  [id number]  Primary Care Provider:  Jeremiah Topete MD         LOS: 7 days       ----------------------------------------------------------------------------------------------------------------------  Subjective       Chief Complaints:    Weakness - Generalized (Pt states dr sent her here for high wbc and kidney function )        Interval History:      Patient is resting in bed on room air in mild respiratory distress.  Seems very short of breath with talking and is coughing very frequently.  Denies any production of sputum.  Denies fever or chills.  Denies abdominal pain, nausea, vomiting, or diarrhea.  Lungs are mildly diminished bilaterally, but overall clear.  Afebrile.  WBC is worsened from 12.16 up to 14.19.    Review of Systems:    Constitutional: no fever, chills and night sweats.  Generalized fatigue.  Eyes: no eye drainage, itching or redness.  HEENT: no mouth sores, dysphagia or nose bleed.  Respiratory: Shortness of breath and nonproductive cough.  Cardiovascular: no chest pain, no palpitations, no orthopnea.  Gastrointestinal: no nausea, vomiting or diarrhea. No abdominal pain, hematemesis or rectal bleeding.  Genitourinary: no dysuria or polyuria.  Hematologic/lymphatic: no lymph node abnormalities, no easy bruising or easy bleeding.  Musculoskeletal: Right lower extremity pain.  Skin: No rash and no itching.  Neurological: no loss of consciousness, no seizure, no headache.  Behavioral/Psych: no depression or suicidal ideation.  Endocrine: no hot flashes.  Immunologic: negative.    ----------------------------------------------------------------------------------------------------------------------      Objective       Current [facility] Meds:  fluticasone, 2 spray, Nasal, Daily  folic acid, 500 mcg, Oral, Daily  heparin (porcine), 5,000  Units, Subcutaneous, Q12H  levothyroxine, 100 mcg, Oral, Daily  loperamide, 2 mg, Oral, Once  metoprolol tartrate, 12.5 mg, Oral, Q12H  nystatin, 1 application, Topical, BID  oxyCODONE, 15 mg, Oral, 4x Daily  pantoprazole, 40 mg, Oral, QAM  sodium chloride, 10 mL, Intravenous, Q12H  sodium chloride, 10 mL, Intravenous, Q12H  sodium chloride, 10 mL, Intravenous, Q12H  sodium chloride, 10 mL, Intravenous, Q12H  vancomycin, 1,250 mg, Intravenous, Q24H  zinc oxide, , Topical, BID         ----------------------------------------------------------------------------------------------------------------------    Vital Signs:  Temp:  [97.9 °F (36.6 °C)-98.6 °F (37 °C)] 98.6 °F (37 °C)  Heart Rate:  [] 100  Resp:  [12-22] 16  BP: ()/(48-84) 119/73  Mean Arterial Pressure (Non-Invasive) for the past 24 hrs (Last 3 readings):   Noninvasive MAP (mmHg)   11/17/22 0700 90   11/17/22 0600 89   11/17/22 0500 83     SpO2 Percentage    11/17/22 0500 11/17/22 0600 11/17/22 0700   SpO2: 94% 93% 95%     SpO2:  [92 %-100 %] 95 %  on   ;   Device (Oxygen Therapy): room air    Body mass index is 91.11 kg/m².  Wt Readings from Last 3 Encounters:   11/17/22 (!) 288 kg (635 lb)        Intake/Output Summary (Last 24 hours) at 11/17/2022 1022  Last data filed at 11/17/2022 0800  Gross per 24 hour   Intake 660 ml   Output 1500 ml   Net -840 ml     Diet Regular Texture (IDDSI 7); Regular Consistency; Cardiac Diets, Renal Diets; Healthy Heart (2-3 Na+); Low Sodium (2-3g)  ----------------------------------------------------------------------------------------------------------------------      Physical Exam:    Constitutional:  female is sitting up in bed in mild respiratory distress.  HENT:  Head: Normocephalic and atraumatic.  Mouth:  Moist mucous membranes.    Eyes:  Conjunctivae and EOM are normal.  No scleral icterus.  Neck:  Neck supple.  No JVD present.    Cardiovascular:  Normal rate, regular rhythm and normal heart  sounds with no murmur. No edema.  Pulmonary/Chest:  No wheezes, no crackles, no rales, with mildly diminished breath sounds bilaterally, but overall clear.  Abdominal:  Soft.  Bowel sounds are normal.  No distension and no tenderness.   Musculoskeletal: Bilateral lower extremity lymphedema.  Right lower extremity in Ace wrap this morning.  Neurological:  Alert and oriented to person, place, and time.  No facial droop.  No slurred speech.   Skin:  Skin is warm and dry.  No rash noted.  No pallor. Bilateral lower extremity lymphedema.    Psychiatric:  Normal mood and affect.  Behavior is normal.      ----------------------------------------------------------------------------------------------------------------------  Results from last 7 days   Lab Units 11/16/22  0912 11/16/22  0506 11/10/22  1433   TROPONIN T ng/mL 0.014 <0.010 0.016           Results from last 7 days   Lab Units 11/17/22  0918 11/16/22  0012 11/15/22  0019 11/13/22  0053 11/12/22  1015 11/12/22  0832 11/12/22  0433 11/11/22  1226 11/11/22  0648 11/11/22  0045 11/10/22  1800 11/10/22  1546 11/10/22  1433   CRP mg/dL  --   --   --   --  14.42*  --   --   --   --  17.21*  --   --  20.13*   LACTATE mmol/L  --   --   --   --   --  3.7*  --  3.9* 4.6* 5.0*   < >  --  5.0*   WBC 10*3/mm3 14.19* 12.16* 11.11*   < >  --   --    < >  --  9.70 10.93*  --   --  13.35*   HEMOGLOBIN g/dL 8.2* 8.4* 8.2*   < >  --   --    < >  --  8.7* 9.0*  --   --  9.5*   HEMATOCRIT % 24.3* 25.1* 25.8*   < >  --   --    < >  --  27.3* 28.1*  --   --  29.4*   MCV fL 96.4 96.2 101.6*   < >  --   --    < >  --  101.5* 99.3*  --   --  97.7*   MCHC g/dL 33.7 33.5 31.8   < >  --   --    < >  --  31.9 32.0  --   --  32.3   PLATELETS 10*3/mm3 56* 42* 32*   < >  --   --    < >  --  43* 47*  --   --  56*   INR   --   --   --   --   --   --   --   --   --   --   --  1.12*  --     < > = values in this interval not displayed.     Results from last 7 days   Lab Units 11/17/22  0918  11/17/22  0042 11/16/22  0012 11/15/22  0019 11/14/22  1435 11/12/22  0433 11/11/22  0648 11/11/22  0045 11/10/22  1433   SODIUM mmol/L 132*  --  131* 130*  --    < > 130* 132* 133*   POTASSIUM mmol/L 4.1  --  3.8 3.8  --    < > 5.6* 4.4 5.2   MAGNESIUM mg/dL  --  1.9  --  1.5* 1.5*  --   --   --   --    CHLORIDE mmol/L 97*  --  96* 97*  --    < > 96* 95* 94*   CO2 mmol/L 27.6  --  25.4 24.3  --    < > 18.2* 21.0* 25.9   BUN mg/dL 35*  --  40* 44*  --    < > 69* 68* 67*   CREATININE mg/dL 1.87*  --  1.99* 2.26*  --    < > 4.30* 4.53* 4.41*   CALCIUM mg/dL 7.9*  --  7.8* 7.6*  --    < > 8.5* 8.8 9.1   GLUCOSE mg/dL 120*  --  143* 118*  --    < > 112* 125* 114*   ALBUMIN g/dL  --   --   --   --   --   --  1.58* 2.11* 2.68*   BILIRUBIN mg/dL  --   --   --   --   --   --  0.7 0.7 0.8   ALK PHOS U/L  --   --   --   --   --   --  101 116 134*   AST (SGOT) U/L  --   --   --   --   --   --  34* 25 22   ALT (SGPT) U/L  --   --   --   --   --   --  10 11 9    < > = values in this interval not displayed.   Estimated Creatinine Clearance: 81.7 mL/min (A) (by C-G formula based on SCr of 1.87 mg/dL (H)).  No results found for: AMMONIA    No results found for: HGBA1C, POCGLU  Lab Results   Component Value Date    HGBA1C 4.70 (L) 11/10/2022     Lab Results   Component Value Date    TSH 0.960 11/10/2022       Blood Culture   Date Value Ref Range Status   11/10/2022 No growth at 24 hours  Preliminary   11/10/2022 No growth at 24 hours  Preliminary     No results found for: URINECX  No results found for: WOUNDCX  No results found for: STOOLCX  No results found for: RESPCX  Pain Management Panel     Pain Management Panel Latest Ref Rng & Units 11/11/2022    AMPHETAMINES SCREEN, URINE Negative Negative    BARBITURATES SCREEN Negative Negative    BENZODIAZEPINE SCREEN, URINE Negative Negative    BUPRENORPHINEUR Negative Negative    COCAINE SCREEN, URINE Negative Negative    METHADONE SCREEN, URINE Negative Negative    METHAMPHETAMINEUR  Negative Negative            ----------------------------------------------------------------------------------------------------------------------  Imaging Results (Last 24 Hours)     ** No results found for the last 24 hours. **          ----------------------------------------------------------------------------------------------------------------------    Pertinent Infectious Disease Results    Lactic acid 5.0 on admission.  Procalcitonin 0.87.  Urinalysis unremarkable.  COVID-19 and influenza PCR negative.  Blood cultures from 11/10/2022 show no growth thus far.  CT of the abdomen pelvis from 11/10/2022 reports no acute intra-abdominal abnormality, mildly nodular contour of the liver may represent early cirrhosis, borderline splenomegaly, CT of the chest from 11/10/2022 reports no acute cardiopulmonary disease, bibasilar subsegmental atelectasis, small hiatal hernia.  Right lower extremity CT reports advanced cellulitis of the right lower extremity extending from the level of the proximal thigh through the foot with maximum thickness of the inflammatory change and stranding medially measuring greater than 15 cm at the level of the distal thigh and knee, no drainable fluid collection no subcutaneous gas, no focal erosive changes to suggest osteomyelitis at this time. GI PCR negative.  Urinalysis from 11/11/2022 unremarkable.  Right lower extremity venous duplex negative for DVT.  Right lower extremity erythema improved but continues with significant edema.    Chest x-ray from 11/16/2022 reports improved aeration specifically of the left lung base, tiny bilateral pleural effusions.     Based on previous culture with MRSA from joint aspiration at outside facility recommend to continue vancomycin monotherapy.  Patient will require prolonged course of antibiotic therapy for chronic osteomyelitis of the right knee secondary to complication from recurrent partially treated right knee septic arthritis and will need  prolonged course of IV antibiotic therapy x8 weeks followed by oral suppressive therapy with very grim prognosis regarding clearing the infection without repetitive washout and aspiration.  Per orthopedic surgery risk outweighs benefit for surgical intervention at this time.        Assessment/Plan       Assessment     Septic shock with lactic acid greater than 4 on admission  History of right knee pyogenic arthritis with MRSA with superimposed right knee chronic osteomyelitis  Right lower extremity cellulitis and lymphedema        Plan      I have seen and examined the patient myself this morning and discussed the plan of care with Tori Ramos PA-C and here are my findings:    Patient is resting comfortably in bed on room air with mild respiratory distress seems to be short of breath and having coughing episodes but no production of sputum.  No fever or chills and no diarrhea reported and denies any abdominal pain.  Lungs are clear to auscultation with transmitted rhonchi at times.  Her right lower extremity is wrapped with compressive Ace wrap.    Laboratory wise WBC is worsened from 12.16 up to 14.19 and I am concerned about new onset of her coughing but has been addressed with primary team and chest x-ray showed improved aeration especially to the left lung base with tiny bilateral pleural effusion.    Patient continues on vancomycin x8 more weeks followed by suppressive oral therapy afterwards.      Code Status:   Code Status and Medical Interventions:   Ordered at: 11/10/22 1939     Level Of Support Discussed With:    Patient     Code Status (Patient has no pulse and is not breathing):    CPR (Attempt to Resuscitate)     Medical Interventions (Patient has pulse or is breathing):    Full Support       Tori Ramos PA-C  11/17/22  10:22 EST     Electronically signed by Tori Ramos PA-C, 11/17/22, 10:25 AM EST.      Electronically signed by Kane Winter MD, 11/17/22, 10:35 AM EST.

## 2022-11-17 NOTE — DISCHARGE SUMMARY
[facility] HOSPITALISTS DISCHARGE SUMMARY    Patient Identification:  Name:  Malina Velasquez  Age:  57 y.o.  Sex:  female  :  1965  MRN:  [id number]  Visit Number:  [id number]    Date of Admission: 11/10/2022  Date of Discharge:  2022     PCP: Jeremiah Topete MD    DISCHARGE DIAGNOSIS    Severe sepsis  Septic shock per CMS guidelines  Lactic acidemia  Right lower extremity cellulitis and lymphedema  Right knee chronic osteomyelitis  SUZETTE on CKD  Chronic anemia  Chronic thrombocytopenia  Chronic liver cirrhosis  Chronic hepatitis C  Chronic HFpEF  Essential hypertension  Hypothyroidism  CONSULTS     Infectious disease  Orthopedic surgery  Nephrology  General surgery  Hematology  PROCEDURES PERFORMED      HOSPITAL COURSE  Patient is a 57 y.o. female presented to [facility] complaining of generalized weakness after being sent to the hospital by her PCP for leukocytosis and acute kidney injury.  Please see the admitting history and physical for further details.      Patient presented to the emergency department at the request of her PCP noted to have leukocytosis and acute kidney injury with right lower extremity cellulitis and concerns for possible septic arthritis.  Patient was admitted to the hospital service for further evaluation and treatment.  Nephrology was consulted given her acute kidney injury, infectious disease given concern for septic arthritis and orthopedic surgery for evaluation for any possible surgical intervention.  Orthopedic surgery did see and evaluate the patient and felt the risk outweighed the benefit given patient's overall health, comorbidities and generally overall size with her super morbid obesity.  Patient's leg did slowly improve in terms of erythema and pain with antibiotic therapy although edema did not change much.  Patient does have chronic lymphedema but noted that her right leg usually was not as swollen.  Patient's leg however did  improve to the point where Ace wrappings were placed which did provide relief and improvement for patient.  SUZETTE was felt to be multifactorial including ATN from prolonged prerenal state and with coadministration of NSAIDs and ATN from sepsis.  Nephrology was concerned about the possibility of HUS and TMA.  Hematology did see and evaluate patient and did not feel that this was likely and felt the patient's overall presentation and laboratory derangements were secondary to her chronic cirrhosis and hepatitis C.  Given that patient was not a surgical candidate with right knee chronic osteomyelitis ID recommended a total 8-week course of vancomycin and patient did have PICC line placed with success and arrange for outpatient at home antibiotic therapy.  This was arranged with the help of  and on day of arrangement patient was excited to return home and discharged in stable medical condition.        VITAL SIGNS:  Temp:  [98.1 °F (36.7 °C)-98.6 °F (37 °C)] 98.6 °F (37 °C)  Heart Rate:  [] 102  Resp:  [12-22] 19  BP: ()/(48-84) 114/63  SpO2:  [92 %-97 %] 93 %  on   ;   Device (Oxygen Therapy): room air    Body mass index is 91.11 kg/m².  Wt Readings from Last 3 Encounters:   11/17/22 (!) 288 kg (635 lb)       PHYSICAL EXAM:  Constitutional: Morbidly obese chronically ill-appearing female resting in bed, NAD  HENT:  Head:  Normocephalic and atraumatic.  Mouth:  Moist mucous membranes.    Eyes:  Conjunctivae and EOM are normal. No scleral icterus.    Cardiovascular:  Normal rate, regular rhythm and normal heart sounds with no murmur.  Pulmonary/Chest:  No respiratory distress, no wheezes, no crackles, with normal breath sounds and good air movement.  Abdominal:  Soft.  Bowel sounds are normal.  No distension and no tenderness.   Musculoskeletal: Tenderness to palpation of the right lower extremity with out deformity.  No red or swollen joints anywhere.  Functional ROM intact.   Neurological:  Alert  and oriented to person, place, and time.  No cranial nerve deficit.  No tongue deviation.  No facial droop.  No slurred speech.   Skin:  Skin is warm and dry.  Mild erythema of the very distal end of the right lower extremity abutting the dorsum of the right foot with some erythema present there as well.  Peripheral vascular:  Pulses in all 4 extremities with no clubbing, no cyanosis, bilateral extensive lymphedema present, right greater than left.  Psychiatric: Appropriate mood and affect, pleasant.     DISCHARGE DISPOSITION   Stable    DISCHARGE MEDICATIONS:     Discharge Medications      New Medications      Instructions Start Date   benzonatate 100 MG capsule  Commonly known as: TESSALON   100 mg, Oral, Every 4 Hours PRN      folic acid 1 MG tablet  Commonly known as: FOLVITE   500 mcg, Oral, Daily   Start Date: November 18, 2022     vancomycin   1,250 mg, Intravenous, Every 24 Hours, Infusion pharmacy to manage and adjust dose as necessary for weekly bmp/renal function         Changes to Medications      Instructions Start Date   bumetanide 2 MG tablet  Commonly known as: BUMEX  What changed: when to take this   2 mg, Oral, Daily      oxyCODONE 15 MG immediate release tablet  Commonly known as: ROXICODONE  What changed: Another medication with the same name was added. Make sure you understand how and when to take each.   15 mg, Oral, 4 Times Daily      oxyCODONE 5 MG immediate release tablet  Commonly known as: ROXICODONE  What changed: Another medication with the same name was added. Make sure you understand how and when to take each.   5 mg, Oral, 2 Times Daily PRN, Prior to [facility] Admission, Patient was on:  breakthrough pain      oxyCODONE 10 MG tablet  Commonly known as: ROXICODONE  What changed: You were already taking a medication with the same name, and this prescription was added. Make sure you understand how and when to take each.   10 mg, Oral, 3 Times Daily PRN         Continue These Medications       Instructions Start Date   cetirizine 10 MG tablet  Commonly known as: zyrTEC   10 mg, Oral, 2 Times Daily PRN      diphenhydrAMINE 25 mg capsule  Commonly known as: BENADRYL   25 mg, Oral, Nightly PRN, Prior to [facility] Admission, Patient was on:  1 to 2 caps at bedtime as needed      docusate sodium 100 MG capsule  Commonly known as: COLACE   100 mg, Oral, 2 Times Daily PRN      fluticasone 50 MCG/ACT nasal spray  Commonly known as: FLONASE   2 sprays, Nasal, Daily      hydrOXYzine 50 MG tablet  Commonly known as: ATARAX   50 mg, Oral, 3 Times Daily PRN      levothyroxine 100 MCG tablet  Commonly known as: SYNTHROID, LEVOTHROID   100 mcg, Oral, Daily      metoprolol tartrate 50 MG tablet  Commonly known as: LOPRESSOR   12.5 mg, Oral, 2 Times Daily PRN, Prior to [facility] Admission, Patient was on:  if heart rate greater than 100      nystatin 377766 UNIT/GM powder  Commonly known as: MYCOSTATIN   1 application, Topical, 2 Times Daily PRN, Prior to [facility] Admission, Patient was on:  stomach folds      nystatin 401262 UNIT/GM cream  Commonly known as: MYCOSTATIN   1 application, Topical, 2 Times Daily, Prior to [facility] Admission, Patient was on:  Mixes with Zinc oxide, uses on stomach folds      omeprazole 40 MG capsule  Commonly known as: priLOSEC   40 mg, Oral, Daily      traZODone 50 MG tablet  Commonly known as: DESYREL   50 mg, Oral, Nightly PRN, Prior to [facility] Admission, Patient was on:  1 to 2 tabs at bedtime as needed      zinc oxide 13 % cream cream   1 application, Topical, 2 Times Daily, Prior to [facility] Admission, Patient was on:  mixes with Nystatin, uses on stomach folds.               Additional Instructions for the Follow-ups that You Need to Schedule     Ambulatory Referral to Home Health (Hospital)   As directed      PICC line to be removed after completion of antibiotic therapy    Weekly BMP with results sent to Infusion pharmacy for dosing    Order Comments: PICC line to be removed after  completion of antibiotic therapy  Weekly BMP with results sent to Infusion pharmacy for dosing     Face to Face Visit Date: 11/17/2022    Follow-up provider for Plan of Care?: I treated the patient in an acute care facility and will not continue treatment after discharge.    Follow-up provider: JEREMIAH TOPETE [[id number]]    Reason/Clinical Findings: antibiotic infusion therapy    Describe mobility limitations that make leaving home difficult: bed bound, home antibiotic therapy    Nursing/Therapeutic Services Requested: Skilled Nursing    Skilled nursing orders: Infusion therapy PICC line care/instruction Other    Frequency: 1 Week 1         Ambulatory Referral to Lymphedema Clinic   As directed      Service Requested: Either PT or OT         Discharge Follow-up with PCP   As directed       Currently Documented PCP:    Jeremiah Topete MD    PCP Phone Number:    [phone]     Follow Up Details: 1-2 week post hospital follow up            Follow-up Information     Jeremiah Topete MD .    Specialty: Family Medicine  Why: 1-2 week post hospital follow up  Contact information:  [address] [place] [address]  [phone]             Kane Winter MD .    Specialty: Infectious Diseases  Contact information:  [address] [facility] [address] [phone]             [facility] .    Specialty: Occupational Therapy  Contact information:  [address] [facility] [address]  [phone]                        TEST  RESULTS PENDING AT DISCHARGE  Pending Labs     Order Current Status    Methylmalonic Acid, Serum In process           The ASCVD Risk score (Nik DK, et al., 2019) failed to calculate for the following reasons:    Cannot find a previous HDL lab    Cannot find a previous total cholesterol lab     CODE STATUS  Code Status and Medical Interventions:   Ordered at: 11/10/22 1939     Level Of Support Discussed With:    Patient     Code Status (Patient has no  pulse and is not breathing):    CPR (Attempt to Resuscitate)     Medical Interventions (Patient has pulse or is breathing):    Full Support       Michael Roman DO  [facility] Hospitalist  11/17/22  13:19 EST    Please note that this discharge summary required more than 30 minutes to complete.

## 2022-11-17 NOTE — DISCHARGE PLACEMENT REQUEST
"Malina Velasquez (57 y.o. Female)     Date of Birth   1965    Social Security Number       Address   [address] [place] [address]    Home Phone   [phone]    MRN   [id number]       Episcopalian   None    Marital Status                               Admission Date   11/10/22    Admission Type   Emergency    Admitting Provider   Miguel Licea MD    Attending Provider   Michael Roman DO    Department, Room/Bed   [facility], P207/1P       Discharge Date       Discharge Disposition   Home-Health Care [facility]    Discharge Destination                               Attending Provider: Michael Roman DO    Allergies: Sulfa Antibiotics, Nsaids, Penicillins    Isolation: None   Infection: None   Code Status: CPR    Ht: 177.8 cm (70\")   Wt: 288 kg (635 lb)    Admission Cmt: None   Principal Problem: Acute on chronic renal failure (HCC) [N17.9,N18.9]                 Active Insurance as of 11/10/2022     Primary Coverage     Payor Plan Insurance Group Employer/Plan Group    [place] [id number]     Payor Plan Address Payor Plan Phone Number Payor Plan Fax Number Effective Dates    [employer] [address] [address] [phone]  2021 - None Entered    [facility] [id number]       Subscriber Name Subscriber Birth Date Member ID       MALINA VELASQUEZ 1965 [id number]                 Emergency Contacts      (Rel.) Home Phone Work Phone Mobile Phone    Ant Velasquez (Spouse) [phone] -- --    GLADIS HODGE (Daughter) [phone] -- --        [address] [address]  Dept. Phone:  [phone]  Dept. Fax:  [phone] Date Ordered: 2022         Patient:  Malina Velasquez MRN:  [id number]   [address]  [place] [address] :  1965  SSN:    Phone: [phone] Sex:  F     Weight: 288 kg (635 lb)         Ht Readings from Last 1 Encounters:   11/10/22 177.8 cm (70\")    " "     Standard Wheelchair              (Order ID: [id number])    Diagnosis:  Chronic osteomyelitis (HCC) (M86.60 [ICD-10-CM] 730.10 [ICD-9-CM])   Quantity:  1     Equipment:  Extra Heavy Duty Wheelchair,, weight capacity greater than 301 pounds  Wheelchair accessories:  Manual W/C Seat Widths 24-27 inches  Length of Need (99 Months = Lifetime): 99 Months = Lifetime        Authorizing Provider's Phone: [phone]  Authorizing Provider:Michael Roman DO  Authorizing Provider's NPI: [id number]  Order Entered By: Michael Roman DO 2022  1:18 PM     Electronically signed by: Michael Roman DO 2022  1:18 PM     [address] [address]  Dept. Phone:  [phone]  Dept. Fax:  [phone] Date Ordered: 2022         Patient:  Malina Velasquez MRN:  [id number]   [address]  [facility] [address] :  1965  SSN:    Phone: [phone] Sex:  F     Weight: 288 kg (635 lb)         Ht Readings from Last 1 Encounters:   11/10/22 177.8 cm (70\")         Hospital Bed  (Order ID: [id number])    Diagnosis:  Chronic osteomyelitis (HCC) (M86.60 [ICD-10-CM] 730.10 [ICD-9-CM])   Quantity:  1     Equipment:  Bariatric Hospital Bed, Heavy Duty, Extra Wide w/ Rails & Mattress, Weight Capacity Greater than 350 pounds, but less than or equal to 600 pounds  Accessories:  Heavy Duty Trapeze Bar w/ Grab Bar, weight capacity greater or equal to 251 pounds  Accessories:  Gel Pressure Mattress Pad (Group 1 Support)  Length of Need (99 Months = Lifetime): 99 Months = Lifetime        Authorizing Provider's Phone: [phone]  Authorizing Provider:Michael Roman DO  Authorizing Provider's NPI: [id number]  Order Entered By: Michael Roman DO 2022  1:18 PM     Electronically signed by: Michael Roman DO 2022  1:18 PM            History & Physical      Maria Isabel Draper PA-C at 11/10/22 2009     Attestation signed by Miguel Licea " MD Bebeto at 11/10/22 4399 (Updated)    I have seen and examined the patient independently from Maria Isabel Draper PA-C, and have reviewed this documentation and agree. Continue broad spectrum IV antibiotics to cover what appears to be right foot cellulitis superimposed on severe chronic lymphedema, along with concern for untreated pyogenic arthritis of right knee that was diagnosed per joint aspiration in 2022 as documented in Maria Isabel's note. Await further recommendations from ID and orthopedic surgery. Appreciate nephrology's recommendations regarding IV fluids. Will monitor closely for signs/symptoms of developing fluid overload in light of patient's history of CHF. Extent of CHF unknown; will follow up on ECHO ordered for tomorrow morning. Regarding patient's report of exposure to grandchildren with flu, she clarified to me that her grandchildren had cold and flu-like symptoms but tested negative for flu A and B. Respiratory PCR panel has been ordered to rule out other infectious causes, result is still pending.                       [facility] Medicine Services  History & Physical    Patient Identification:  Name:  Malina Velasquez  Age:  57 y.o.  Sex:  female  :  1965  MRN:  [id number]   Visit Number:  [id number]  Primary Care Physician:  Jeremiah Topete MD    Subjective     11/10/2022   Chief complaint:   Chief Complaint   Patient presents with   • Weakness - Generalized     Pt states dr sent her here for high wbc and kidney function      History of presenting illness:      Malina Velasquez is a 57 y.o. female with past medical history significant for CHF, essential hypertension, history of MVP, COPD, chronic hepatitis C, and former tobacco abuse who presents to [facility] emergency department with complaints of generalized weakness.     Patient states that she seen her PCP on Monday for annual blood work and was called back this morning due to her labs being  "abnormal. Specifically she was told that her creatinine was 4 and her WBC was 23K. She states that she lives with her grandchildren who was recently diagnosed with the Flu. She reports that approximately 10 days ago she developed symptoms similar to what they were experiencing. She states that she developed diarrhea and used \"3 boxes of Imodium over 1 weekend.\" She states that the diarrhea is now resolved and she had a normal bowel movement on Monday. She reports that she also had some nausea and vomiting which she attributes to not having good oral intake and continuing to take her home medications on an empty stomach. However, Tuesday night she developed a cough that is productive with green-tinged sputum. She also complains of congestion, nasal drainage, and sore throat. She did take 4 doses of left-over doxycycline and a dose of Suvextro to \"clear up her infection.\" She does report chills, but denies any fevers. She denies any abdominal pain. She denies any chest pain. She does report sediment in her urine, but denies any urinary symptoms.    Patient also explains that she has had worsening swelling of her right lower extremity over the past month. She can hardly tolerate the right knee or her right lower extremity to be touched or moved during exam. She states 3 years ago she had spontaneous MRSA infection of her right knee in which she had an arthroscopic knee joint washout performed in [place] and was treated with an extended course of antibiotics. Per chart review, she was seen by [facility] 07/2022 who performed joint aspiration and was diagnosed with pyogenic arthritis of right knee due to unspecified organism. Patient states that she does not remember completing a course of antibiotics after the aspiration of the joint was performed.     Upon arrival to the ED, vital signs were temperature 98.7, heart rate 95, respirations 16, /76, SpO2 95% on room air. CMP with glucose 114, " sodium 133, chloride 94, creatinine 4.41, BUN 67, eGFR 11.1, alkaline phosphatase 134, albumin 2.68, otherwise within normal limits. CBC with WBC 13.35, RBC 3.01, hemoglobin 9.5, hematocrit 29.4, MCV 97.7, platelets 56, otherwise unremarkable. C-RP 20.13. Lactate 5.0. Procalcitonin 1.13. Hemoglobin A1c 4.7. TSH 0.960. Pending peripheral blood cultures x 2. Chest XR with cardiomegaly and pulmonary vascular congestion and right basilar airspace disease. CT abdomen/pelvis with no acute intra-abdominal abnormality, mildly nodular contour of the liver which may reflect early cirrhosis, and borderline splenomegaly. CT chest with no acute cardiopulmonary disease, small calcified pulmonary nodule of left upper lobe likely pulmonary granuloma, bibasilar subsegmental atelectasis, small hiatal hernia.     Known Emergency Department medications received prior to my evaluation included IV Vancomycin, IV Merrem, IV Dilaudid 1 mg, IV Zofran 4 mg, sepsis bolus (2055 mL). Emergency Department Room location at the time of my evaluation was 406.     Patient will be admitted to the PCU for further evaluation and monitoring.     ---------------------------------------------------------------------------------------------------------------------   Review of Systems   Constitutional: Positive for appetite change (decreased) and chills. Negative for activity change and fever.   HENT: Positive for congestion, rhinorrhea and sore throat.    Eyes: Negative for discharge and redness.   Respiratory: Positive for cough (productive with green tinged sputum). Negative for apnea, shortness of breath and wheezing.    Cardiovascular: Positive for leg swelling (right lower extremity). Negative for chest pain and palpitations.   Gastrointestinal: Positive for diarrhea (reports having normal bowel movement on Monday), nausea and vomiting. Negative for abdominal distention and abdominal pain.   Genitourinary: Negative for difficulty urinating, dysuria,  frequency and urgency.        Reports sediment in urine   Musculoskeletal: Positive for arthralgias (right knee) and joint swelling (right knee).   Skin: Negative for color change and wound.   Neurological: Positive for weakness (generalized). Negative for dizziness and light-headedness.   Psychiatric/Behavioral: Negative for agitation, behavioral problems and confusion.        ---------------------------------------------------------------------------------------------------------------------   Past Medical History:   Diagnosis Date   • CHF (congestive heart failure) ([facility])    • COPD (chronic obstructive pulmonary disease) ([facility])    • Edema    • Hep C w/o coma, chronic ([facility])    • Hypertension    • Mitral valve prolapse      Past Surgical History:   Procedure Laterality Date   • GASTRIC BANDING     • JOINT ASPIRATION AND/OR INJECTION Right 07/2022     Family History   Problem Relation Age of Onset   • Hypertension Mother    • Breast cancer Mother    • COPD Father      Social History     Socioeconomic History   • Marital status:    Tobacco Use   • Smoking status: Never   Vaping Use   • Vaping Use: Never used   Substance and Sexual Activity   • Alcohol use: Not Currently   • Drug use: Never   • Sexual activity: Defer     ---------------------------------------------------------------------------------------------------------------------   Allergies:  Sulfa antibiotics and Penicillins  ---------------------------------------------------------------------------------------------------------------------   Home medications:    Medications below are reported home medications pulling from within the system; at this time, these medications have not been reconciled unless otherwise specified and are in the verification process for further verifcation as current home medications.  Medications Prior to Admission   Medication Sig Dispense Refill Last Dose   • bumetanide (BUMEX) 2 MG tablet Take 1 tablet by mouth Daily.       • hydrALAZINE (APRESOLINE) 10 MG tablet Take 1 tablet by mouth 3 (Three) Times a Day.      • levothyroxine (SYNTHROID, LEVOTHROID) 25 MCG tablet Take 1 tablet by mouth Daily.      • metoprolol succinate XL (TOPROL-XL) 25 MG 24 hr tablet Take 1 tablet by mouth Daily.      • oxyCODONE-acetaminophen (PERCOCET)  MG per tablet Take 1 tablet by mouth Every 6 (Six) Hours As Needed for Moderate Pain.      • traZODone (DESYREL) 50 MG tablet Take 1 tablet by mouth Every Night.          Hospital Scheduled Meds:  heparin (porcine), 5,000 Units, Subcutaneous, Q12H  [START ON 11/11/2022] meropenem, 1 g, Intravenous, Q12H  sodium chloride, 10 mL, Intravenous, Q12H  [START ON 11/11/2022] vancomycin, 500 mg, Intravenous, Q24H      sodium chloride, 125 mL/hr, Last Rate: 125 mL/hr (11/10/22 2008)        Current listed hospital scheduled medications may not yet reflect those currently placed in orders that are signed and held awaiting patient's arrival to floor.   ---------------------------------------------------------------------------------------------------------------------     Objective     Vital Signs:  Temp:  [98.7 °F (37.1 °C)] 98.7 °F (37.1 °C)  Heart Rate:  [95] 95  Resp:  [16] 16  BP: (120-131)/(76-89) 120/89      11/10/22  1340   Weight: (!) 181 kg (399 lb)     Body mass index is 57.25 kg/m².  ---------------------------------------------------------------------------------------------------------------------       Physical Exam  Constitutional:       General: She is awake.      Appearance: She is obese. She is ill-appearing (disheveled).      Comments: Patient resting in bed. Appears in no acute distress.    HENT:      Head: Normocephalic and atraumatic.      Right Ear: External ear normal.      Left Ear: External ear normal.      Nose: Nose normal.      Mouth/Throat:      Mouth: Mucous membranes are moist.      Pharynx: Oropharynx is clear.   Eyes:      Extraocular Movements: Extraocular movements intact.       Conjunctiva/sclera: Conjunctivae normal.      Pupils: Pupils are equal, round, and reactive to light.   Cardiovascular:      Rate and Rhythm: Normal rate and regular rhythm.      Pulses: Normal pulses.           Dorsalis pedis pulses are 2+ on the right side and 2+ on the left side.        Posterior tibial pulses are 2+ on the right side and 2+ on the left side.      Heart sounds: Murmur (Left upper sternal border) heard.   Pulmonary:      Effort: Pulmonary effort is normal. No accessory muscle usage or respiratory distress.      Breath sounds: Normal breath sounds. No decreased breath sounds, wheezing, rhonchi or rales.      Comments: Difficult exam due to body habitus. No obvious wheezes, crackles, or rhonchi on auscultation.  Abdominal:      General: Abdomen is flat. Bowel sounds are normal. There is no distension.      Palpations: Abdomen is soft.      Tenderness: There is no abdominal tenderness. There is no guarding.   Musculoskeletal:         General: Swelling (right lower extremity) and tenderness (right lower extremity) present.      Cervical back: Normal range of motion and neck supple.      Right lower leg: Edema present.      Left lower leg: No edema.      Comments: ROM of right lower limited due to pain.    Skin:     General: Skin is warm and dry.      Findings: Erythema present.      Comments: Right foot with slight erythema on dorsal surface. Increased edema and warmth noted of RLE and right knee compared to LLE. No obvious open wounds or drainage. Difficult to assess posterior aspect of leg due to patient complaining of pain.     Not able to assess back or gluteal area due to patient refusing to turn. Will attempt to assess when patient arrives to floor and able to get assistance to turn patient.    Neurological:      General: No focal deficit present.      Mental Status: She is alert and oriented to person, place, and time. Mental status is at baseline.   Psychiatric:         Mood and Affect: Mood  normal.         Behavior: Behavior normal. Behavior is cooperative.         Thought Content: Thought content normal.         ---------------------------------------------------------------------------------------------------------------------  EKG:    Pending cardiology read. Per my review, EKG reveals NSR with HR 89 and QTc 476 ms without acute ischemic changes.         I have personally looked at both the EKG and the telemetry strips.  ---------------------------------------------------------------------------------------------------------------------   Results from last 7 days   Lab Units 11/10/22  1800 11/10/22  1546 11/10/22  1433   CRP mg/dL  --   --  20.13*   LACTATE mmol/L 4.2*  --  5.0*   WBC 10*3/mm3  --   --  13.35*   HEMOGLOBIN g/dL  --   --  9.5*   HEMATOCRIT %  --   --  29.4*   MCV fL  --   --  97.7*   MCHC g/dL  --   --  32.3   PLATELETS 10*3/mm3  --   --  56*   INR   --  1.12*  --          Results from last 7 days   Lab Units 11/10/22  1433   SODIUM mmol/L 133*   POTASSIUM mmol/L 5.2   CHLORIDE mmol/L 94*   CO2 mmol/L 25.9   BUN mg/dL 67*   CREATININE mg/dL 4.41*   CALCIUM mg/dL 9.1   GLUCOSE mg/dL 114*   ALBUMIN g/dL 2.68*   BILIRUBIN mg/dL 0.8   ALK PHOS U/L 134*   AST (SGOT) U/L 22   ALT (SGPT) U/L 9   Estimated Creatinine Clearance: 25.3 mL/min (A) (by C-G formula based on SCr of 4.41 mg/dL (H)).  No results found for: AMMONIA  Results from last 7 days   Lab Units 11/10/22  1433   TROPONIN T ng/mL 0.016         Lab Results   Component Value Date    HGBA1C 4.70 (L) 11/10/2022     Lab Results   Component Value Date    TSH 0.960 11/10/2022     No results found for: PREGTESTUR, PREGSERUM, HCG, HCGQUANT  Pain Management Panel    There is no flowsheet data to display.           ---------------------------------------------------------------------------------------------------------------------  Imaging Results (Last 7 Days)     Procedure Component Value Units Date/Time    CT Lower Extremity Right  Without Contrast [[id number]] Resulted: 11/10/22 2229     Updated: 11/10/22 2246    CT Chest Without Contrast Diagnostic [[id number]] Collected: 11/10/22 1720     Updated: 11/10/22 1723    Narrative:      CT Chest WO    CLINICAL HISTORY: soa.    COMPARISON: None.    TECHNIQUE: CT chest without contrast. Coronal and sagittal reconstructions were obtained.  Radiation dose reduction techniques included automated exposure control or exposure modulation based on body size. Count of known CT and cardiac nuc med studies  performed in previous 12 months: 0.     FINDINGS:    Lungs: Bibasilar subsegmental atelectasis. No focal consolidation or mass. Small calcified pulmonary nodule in left upper lobe, likely pulmonary granuloma.    Pleura: No pleural effusions.    Cardiovascular: Normal heart size. No pericardial effusion. Normal course and caliber of the thoracic aorta.    Mediastinum: Evaluation is limited by lack of intravenous contrast. Small calcified hilar mediastinal lymph nodes.    Osseous: No acute osseous abnormality. Degenerative changes of the thoracic spine and dextroconvex curvature of the thoracic spine.    Other: None.    Upper abdomen: Small hiatal hernia.       Impression:        1.  No acute cardiopulmonary disease.   2.  Bibasilar subsegmental atelectasis.  3.  Small hiatal hernia.    Signer Name: Mark Bird MD   Signed: 11/10/2022 5:20 PM   Workstation Name: [id number]    [facility]    CT Abdomen Pelvis Without Contrast [[id number]] Collected: 11/10/22 1717     Updated: 11/10/22 1719    Narrative:      CT Abdomen Pelvis WO    INDICATION:   abd pain    TECHNIQUE:   CT of the abdomen and pelvis without IV contrast. Coronal and sagittal reconstructions were obtained.  Radiation dose reduction techniques included automated exposure control or exposure modulation based on body size. Count of known CT and cardiac nuc  med studies performed in previous 12 months: 0.     COMPARISON:   None  available.    FINDINGS:  Evaluation of abdominal viscera is limited due to lack of intravenous contrast. Study is also degraded by patient body habitus.    Lower chest: Unremarkable.  Liver: Mildly nodular in contour. No solid mass.  Gallbladder and bile ducts: Surgically absent gallbladder. No biliary dilatation.  Spleen: Borderline enlarged with scattered punctate calcifications, likely sequelae of old granulomatous disease.  Pancreas: Diffusely atrophic.  Adrenals: Unremarkable.  Kidneys and ureters: No renal or ureteral stones. No hydronephrosis.  Bowel: Postsurgical changes of the stomach. Nondilated bowel with no wall thickening.Appendix not visualized, but no inflammatory changes present in the right lower quadrant.  Bladder: Underdistended.  Reproductive organs: Normal uterus. No adnexal masses.  Lymph nodes: Unremarkable.  Peritoneum: Unremarkable.  Vessels: Prominent portosystemic collateral vessels.  Abdominal wall: Unremarkable.  Bones: Multilevel degenerative changes of the lumbar spine. Levoconvex curvature of the lumbar spine..      Impression:        1.  Exam is limited by lack of intravenous contrast and patient body habitus.  2.  No acute intra-abdominal abnormality.  3.  Mildly nodular contour of the liver which may reflect early cirrhosis. Recommend correlation with liver function tests.  4.  Borderline splenomegaly.    Signer Name: Mark Bird MD   Signed: 11/10/2022 5:17 PM   Workstation Name: [id number]    [facility]    XR Chest 1 View [[id number]] Collected: 11/10/22 1615     Updated: 11/10/22 1617    Narrative:      EXAM:    XR Chest, 1 View     EXAM DATE:    11/10/2022 3:13 PM     CLINICAL HISTORY:    cough     TECHNIQUE:    Frontal view of the chest.     COMPARISON:    No relevant prior studies available.     FINDINGS:    Lungs:  Pulmonary vascular congestion.  Right basilar airspace  disease.    Pleural space:  Unremarkable.  No pneumothorax.    Heart:  Marked  cardiomegaly.    Mediastinum:  Unremarkable.    Bones/joints:  Unremarkable.       Impression:      1.  Cardiomegaly and pulmonary vascular congestion.  2.  Right basilar airspace disease.     This report was finalized on 11/10/2022 4:15 PM by Dr. Carlos Manuel King MD.           I have personally reviewed the above radiology images and read the final radiology report on 11/10/22  ---------------------------------------------------------------------------------------------------------------------  Assessment / Plan     Active Hospital Problems    Diagnosis  POA   • **Acute on chronic renal failure (HCC) [N17.9, N18.9]  Yes       ASSESSMENT/PLAN:  -Severe sepsis criteria 2/2 undetermined etiology at present, POA  -Cellulitis of right foot and possible recurrent versus persistent septic arthritis of right knee joint , POA  -History of MRSA infection of right knee joint s/p washout  -History of pyogenic arthritis of right knee joint 07/2022  -Reported recent nausea, vomiting, and diarrhea  -Meets severe sepsis criteria with HR >90, C-RP 20.13, WBC 13.35, lactate 5.0, and procalcitonin 1.13.   -Urinalysis with no evidence of acute UTI.   -No acute intra-abdominal findings on CT abdomen/pelvis.  -CT chest with bibasilar atelectasis, but no evidence of opacities or consolidations to suggest pneumonia.  -Reports history of spontaneous MRSA infection of right knee joint requiring arthroscopic washout at [place] and treated with extended course of antibiotics. Seen by [facility] 07/2022 to have joint aspiration performed and diagnosed with pyogenic arthritis of right knee; patient denies completing course of antibiotics.   -Received IV Vancomycin and IV Merrem in ED; will continue on admission while awaiting culture results.   -Received sepsis bolus (2055 mL) in ED; continuous IV fluids ordered.   -Infectious disease and orthopedic surgery consults ordered, input and assistance much appreciated.   -Will  monitor off imodium and if diarrhea reoccurs may consider further work-up for C. Diff.  -Obtain venous doppler of RLE to r/o DVT.    -Obtain respiratory panel PCR.  -Repeat a.m. CBC and C-RP.Trend lactate until normalized. Follow-up on blood cultures.     -Acute on chronic kidney disease, POA  -Creatinine upon arrival 4.41; per verbal report from ED, creatinine preivously 1.34.   -ED provider discussed with on-call nephrology recommending NS @ 125 mL/hr after sepsis bolus.  -Formal inpatient nephrology consult ordered, input and assistance much appreciated.   -No evidence of obstructive uropathy on CT abdomen/pelvis.   -Avoid nephrotoxins as able; will hold home Bumex.   -Repeat a.m. CMP.     -Documented history of CHF  -No previous echo on file; obtain a.m. TTE.     -Essential hypertensin  -BP currently stable.   -Monitor per hospital protocol.   -Review home medications once reconciled.     -Macrocytic anemia  -Thrombocytopenia  -Possible 2/2 SUZETTE versus liver cirrhosis.   -Baseline unknown.   -Obtain iron panel, vitamin B12, and folate levels.   -Obtain peripheral blood smear.   -Repeat a.m. CBC.     -Liver cirrhosis   -Chronic hepatitis C  -Hypoalbuminemia  -Liver enzymes normal.   -May benefit from referral to hepatology/GI at discharge.  -Repeat a.m. CMP.     -Hypothyoridism  -TSH normal.   -Resume home Synthroid.     -Morbid obesity, BMI 57.25 kg/m2  -S/P gastric sleeve 2019  -Complicates all aspects of care  -------------------------------  F/E/N: Continuous NS @ 125 mL/hr. Replace electrolytes as needed. Regular diet.   DVT prophylaxis: SQ Heparin   Activity: Up with assistance; fall precautions  -------------------------------  CODE STATUS: Full    High risk secondary to sepsis of undetermined etiology at present, cellulitis of RLE, possible recurrent vs persistent septic arthritis of right knee joint, acute on CKD  -------------------------------  Expected length of stay:  INPATIENT status due to the need  for care which can only be reasonably provided in an hospital setting such as aggressive/expedited ancillary services and/or consultation services, the necessity for IV medications, close physician monitoring and/or the possible need for procedures.  In such, I feel patient's risk for adverse outcomes and need for care warrant INPATIENT evaluation and predict the patient's care encounter to likely last beyond 2 midnights.     Disposition: Pending clinical course    Maria Isabel Draper PA-C  11/10/22  22:50 EST    ---------------------------------------------------------------------------------------------------------------------           Electronically signed by Miguel Licea MD at 11/10/22 9095

## 2022-11-17 NOTE — CASE MANAGEMENT/SOCIAL WORK
Discharge Planning Assessment  [facility]     Patient Name: Malina Velasquez  MRN: [id number]  Today's Date: 11/17/2022    Admit Date: 11/10/2022    Plan: SS faxed referral to [facility]  per Farida pt can not be accepted due to insurance not being in network. SS faxed referral to [facility]  per Annie pt can not be accept due to insurance not being in network. SS contacted [facility] and they do not have the nursing for IV antibiotics. SS notified Dr. Roman. SS and CM spoke with pt to see if pt is agreeable for Continue Care or Swing Bed at [facility]. Pt states she does not want to go to [place] or Swing bed at [place] she wants to return home. SS notified RN and Dr. Roman. SS following.      Discharge Plan     Row Name 11/17/22 1547       Plan    Plan SS faxed referral to [facility]  per Far[place] pt can not be accepted due to insurance not being in network. SS faxed referral to [facility]  per Annie pt can not be accept due to insurance not being in network. SS contacted [facility] and they do not have the nursing for IV antibiotics. SS notified Dr. Roman. SS and CM spoke with pt to see if pt is agreeable for Continue Care or Swing Bed at [facility]. Pt states she does not want to go to [place] or Swing bed at [place] she wants to return home. SS notified RN and Dr. Roman. SS following.              SVETA Marmolejo

## 2022-11-17 NOTE — DISCHARGE PLACEMENT REQUEST
"Malina Velasquez (57 y.o. Female)     Date of Birth   1965    Social Security Number       Address   [address] [facility] [address]    Home Phone   [phone]    MRN   [id number]       Congregational   None    Marital Status                               Admission Date   11/10/22    Admission Type   Emergency    Admitting Provider   Miguel Licea MD    Attending Provider   Michael Roman DO    Department, Room/Bed   [facility], P207/1P       Discharge Date       [place] [facility] [place]                               Attending Provider: Michael Roman DO    Allergies: Sulfa Antibiotics, Nsaids, Penicillins    Isolation: None   Infection: None   Code Status: CPR    Ht: 177.8 cm (70\")   Wt: 288 kg (635 lb)    Admission Cmt: None   Principal Problem: Acute on chronic renal failure (HCC) [N17.9,N18.9]                 Active Insurance as of 11/10/2022     Primary Coverage     Payor Plan Insurance Group Employer/Plan Group    [place] [id number]     Payor Plan Address Payor Plan Phone Number Payor Plan Fax Number Effective Dates    [employer] [address] [address] [phone]  1/1/2021 - None Entered    [facility] [address]       Subscriber Name Subscriber Birth Date Member ID       MALINA VELASQUEZ 1965 [id number]                 Emergency Contacts      (Rel.) Home Phone Work Phone Mobile Phone    Ant Velasquez (Spouse) [phone] -- --    GLADIS HODGE (Daughter) [phone] -- --               History & Physical      Maria Isabel Draper PA-C at 11/10/22 2009     Attestation signed by Miguel Licea MD at 11/10/22 5806 (Updated)    I have seen and examined the patient independently from Maria Isabel Draper PA-C, and have reviewed this documentation and agree. Continue broad spectrum IV antibiotics to cover what appears to be right foot cellulitis superimposed on severe " chronic lymphedema, along with concern for untreated pyogenic arthritis of right knee that was diagnosed per joint aspiration in 2022 as documented in Maria Isabel's note. Await further recommendations from ID and orthopedic surgery. Appreciate nephrology's recommendations regarding IV fluids. Will monitor closely for signs/symptoms of developing fluid overload in light of patient's history of CHF. Extent of CHF unknown; will follow up on ECHO ordered for tomorrow morning. Regarding patient's report of exposure to grandchildren with flu, she clarified to me that her grandchildren had cold and flu-like symptoms but tested negative for flu A and B. Respiratory PCR panel has been ordered to rule out other infectious causes, result is still pending.                       [facility]  History & Physical    Patient Identification:  Name:  Malina Velasquez  Age:  57 y.o.  Sex:  female  :  1965  MRN:  [id number]   Visit Number:  [id number]  Primary Care Physician:  Jeremiah Topete MD    Subjective     11/10/2022   Chief complaint:   Chief Complaint   Patient presents with   • Weakness - Generalized     Pt states dr sent her here for high wbc and kidney function      History of presenting illness:      Malina Velasquez is a 57 y.o. female with past medical history significant for CHF, essential hypertension, history of MVP, COPD, chronic hepatitis C, and former tobacco abuse who presents to [facility] emergency department with complaints of generalized weakness.     Patient states that she seen her PCP on Monday for annual blood work and was called back this morning due to her labs being abnormal. Specifically she was told that her creatinine was 4 and her WBC was 23K. She states that she lives with her grandchildren who was recently diagnosed with the Flu. She reports that approximately 10 days ago she developed symptoms similar to what they were experiencing. She  "states that she developed diarrhea and used \"3 boxes of Imodium over 1 weekend.\" She states that the diarrhea is now resolved and she had a normal bowel movement on Monday. She reports that she also had some nausea and vomiting which she attributes to not having good oral intake and continuing to take her home medications on an empty stomach. However, Tuesday night she developed a cough that is productive with green-tinged sputum. She also complains of congestion, nasal drainage, and sore throat. She did take 4 doses of left-over doxycycline and a dose of Suvextro to \"clear up her infection.\" She does report chills, but denies any fevers. She denies any abdominal pain. She denies any chest pain. She does report sediment in her urine, but denies any urinary symptoms.    Patient also explains that she has had worsening swelling of her right lower extremity over the past month. She can hardly tolerate the right knee or her right lower extremity to be touched or moved during exam. She states 3 years ago she had spontaneous MRSA infection of her right knee in which she had an arthroscopic knee joint washout performed in [place] and was treated with an extended course of antibiotics. Per chart review, she was seen by [facility] 07/2022 who performed joint aspiration and was diagnosed with pyogenic arthritis of right knee due to unspecified organism. Patient states that she does not remember completing a course of antibiotics after the aspiration of the joint was performed.     Upon arrival to the ED, vital signs were temperature 98.7, heart rate 95, respirations 16, /76, SpO2 95% on room air. CMP with glucose 114, sodium 133, chloride 94, creatinine 4.41, BUN 67, eGFR 11.1, alkaline phosphatase 134, albumin 2.68, otherwise within normal limits. CBC with WBC 13.35, RBC 3.01, hemoglobin 9.5, hematocrit 29.4, MCV 97.7, platelets 56, otherwise unremarkable. C-RP 20.13. Lactate 5.0. Procalcitonin " 1.13. Hemoglobin A1c 4.7. TSH 0.960. Pending peripheral blood cultures x 2. Chest XR with cardiomegaly and pulmonary vascular congestion and right basilar airspace disease. CT abdomen/pelvis with no acute intra-abdominal abnormality, mildly nodular contour of the liver which may reflect early cirrhosis, and borderline splenomegaly. CT chest with no acute cardiopulmonary disease, small calcified pulmonary nodule of left upper lobe likely pulmonary granuloma, bibasilar subsegmental atelectasis, small hiatal hernia.     Known Emergency Department medications received prior to my evaluation included IV Vancomycin, IV Merrem, IV Dilaudid 1 mg, IV Zofran 4 mg, sepsis bolus (2055 mL). Emergency Department Room location at the time of my evaluation was 406.     Patient will be admitted to the PCU for further evaluation and monitoring.     ---------------------------------------------------------------------------------------------------------------------   Review of Systems   Constitutional: Positive for appetite change (decreased) and chills. Negative for activity change and fever.   HENT: Positive for congestion, rhinorrhea and sore throat.    Eyes: Negative for discharge and redness.   Respiratory: Positive for cough (productive with green tinged sputum). Negative for apnea, shortness of breath and wheezing.    Cardiovascular: Positive for leg swelling (right lower extremity). Negative for chest pain and palpitations.   Gastrointestinal: Positive for diarrhea (reports having normal bowel movement on Monday), nausea and vomiting. Negative for abdominal distention and abdominal pain.   Genitourinary: Negative for difficulty urinating, dysuria, frequency and urgency.        Reports sediment in urine   Musculoskeletal: Positive for arthralgias (right knee) and joint swelling (right knee).   Skin: Negative for color change and wound.   Neurological: Positive for weakness (generalized). Negative for dizziness and  light-headedness.   Psychiatric/Behavioral: Negative for agitation, behavioral problems and confusion.        ---------------------------------------------------------------------------------------------------------------------   Past Medical History:   Diagnosis Date   • CHF (congestive heart failure) ([facility])    • COPD (chronic obstructive pulmonary disease) ([facility])    • Edema    • Hep C w/o coma, chronic (HCC)    • Hypertension    • Mitral valve prolapse      Past Surgical History:   Procedure Laterality Date   • GASTRIC BANDING     • JOINT ASPIRATION AND/OR INJECTION Right 07/2022     Family History   Problem Relation Age of Onset   • Hypertension Mother    • Breast cancer Mother    • COPD Father      Social History     Socioeconomic History   • Marital status:    Tobacco Use   • Smoking status: Never   Vaping Use   • Vaping Use: Never used   Substance and Sexual Activity   • Alcohol use: Not Currently   • Drug use: Never   • Sexual activity: Defer     ---------------------------------------------------------------------------------------------------------------------   Allergies:  Sulfa antibiotics and Penicillins  ---------------------------------------------------------------------------------------------------------------------   Home medications:    Medications below are reported home medications pulling from within the system; at this time, these medications have not been reconciled unless otherwise specified and are in the verification process for further verifcation as current home medications.  Medications Prior to Admission   Medication Sig Dispense Refill Last Dose   • bumetanide (BUMEX) 2 MG tablet Take 1 tablet by mouth Daily.      • hydrALAZINE (APRESOLINE) 10 MG tablet Take 1 tablet by mouth 3 (Three) Times a Day.      • levothyroxine (SYNTHROID, LEVOTHROID) 25 MCG tablet Take 1 tablet by mouth Daily.      • metoprolol succinate XL (TOPROL-XL) 25 MG 24 hr tablet Take 1 tablet by mouth Daily.       • oxyCODONE-acetaminophen (PERCOCET)  MG per tablet Take 1 tablet by mouth Every 6 (Six) Hours As Needed for Moderate Pain.      • traZODone (DESYREL) 50 MG tablet Take 1 tablet by mouth Every Night.          Hospital Scheduled Meds:  heparin (porcine), 5,000 Units, Subcutaneous, Q12H  [START ON 11/11/2022] meropenem, 1 g, Intravenous, Q12H  sodium chloride, 10 mL, Intravenous, Q12H  [START ON 11/11/2022] vancomycin, 500 mg, Intravenous, Q24H      sodium chloride, 125 mL/hr, Last Rate: 125 mL/hr (11/10/22 2008)        Current listed hospital scheduled medications may not yet reflect those currently placed in orders that are signed and held awaiting patient's arrival to floor.   ---------------------------------------------------------------------------------------------------------------------     Objective     Vital Signs:  Temp:  [98.7 °F (37.1 °C)] 98.7 °F (37.1 °C)  Heart Rate:  [95] 95  Resp:  [16] 16  BP: (120-131)/(76-89) 120/89      11/10/22  1340   Weight: (!) 181 kg (399 lb)     Body mass index is 57.25 kg/m².  ---------------------------------------------------------------------------------------------------------------------       Physical Exam  Constitutional:       General: She is awake.      Appearance: She is obese. She is ill-appearing (disheveled).      Comments: Patient resting in bed. Appears in no acute distress.    HENT:      Head: Normocephalic and atraumatic.      Right Ear: External ear normal.      Left Ear: External ear normal.      Nose: Nose normal.      Mouth/Throat:      Mouth: Mucous membranes are moist.      Pharynx: Oropharynx is clear.   Eyes:      Extraocular Movements: Extraocular movements intact.      Conjunctiva/sclera: Conjunctivae normal.      Pupils: Pupils are equal, round, and reactive to light.   Cardiovascular:      Rate and Rhythm: Normal rate and regular rhythm.      Pulses: Normal pulses.           Dorsalis pedis pulses are 2+ on the right side and 2+ on the  left side.        Posterior tibial pulses are 2+ on the right side and 2+ on the left side.      Heart sounds: Murmur (Left upper sternal border) heard.   Pulmonary:      Effort: Pulmonary effort is normal. No accessory muscle usage or respiratory distress.      Breath sounds: Normal breath sounds. No decreased breath sounds, wheezing, rhonchi or rales.      Comments: Difficult exam due to body habitus. No obvious wheezes, crackles, or rhonchi on auscultation.  Abdominal:      General: Abdomen is flat. Bowel sounds are normal. There is no distension.      Palpations: Abdomen is soft.      Tenderness: There is no abdominal tenderness. There is no guarding.   Musculoskeletal:         General: Swelling (right lower extremity) and tenderness (right lower extremity) present.      Cervical back: Normal range of motion and neck supple.      Right lower leg: Edema present.      Left lower leg: No edema.      Comments: ROM of right lower limited due to pain.    Skin:     General: Skin is warm and dry.      Findings: Erythema present.      Comments: Right foot with slight erythema on dorsal surface. Increased edema and warmth noted of RLE and right knee compared to LLE. No obvious open wounds or drainage. Difficult to assess posterior aspect of leg due to patient complaining of pain.     Not able to assess back or gluteal area due to patient refusing to turn. Will attempt to assess when patient arrives to floor and able to get assistance to turn patient.    Neurological:      General: No focal deficit present.      Mental Status: She is alert and oriented to person, place, and time. Mental status is at baseline.   Psychiatric:         Mood and Affect: Mood normal.         Behavior: Behavior normal. Behavior is cooperative.         Thought Content: Thought content normal.         ---------------------------------------------------------------------------------------------------------------------  EKG:    Pending cardiology  read. Per my review, EKG reveals NSR with HR 89 and QTc 476 ms without acute ischemic changes.         I have personally looked at both the EKG and the telemetry strips.  ---------------------------------------------------------------------------------------------------------------------   Results from last 7 days   Lab Units 11/10/22  1800 11/10/22  1546 11/10/22  1433   CRP mg/dL  --   --  20.13*   LACTATE mmol/L 4.2*  --  5.0*   WBC 10*3/mm3  --   --  13.35*   HEMOGLOBIN g/dL  --   --  9.5*   HEMATOCRIT %  --   --  29.4*   MCV fL  --   --  97.7*   MCHC g/dL  --   --  32.3   PLATELETS 10*3/mm3  --   --  56*   INR   --  1.12*  --          Results from last 7 days   Lab Units 11/10/22  1433   SODIUM mmol/L 133*   POTASSIUM mmol/L 5.2   CHLORIDE mmol/L 94*   CO2 mmol/L 25.9   BUN mg/dL 67*   CREATININE mg/dL 4.41*   CALCIUM mg/dL 9.1   GLUCOSE mg/dL 114*   ALBUMIN g/dL 2.68*   BILIRUBIN mg/dL 0.8   ALK PHOS U/L 134*   AST (SGOT) U/L 22   ALT (SGPT) U/L 9   Estimated Creatinine Clearance: 25.3 mL/min (A) (by C-G formula based on SCr of 4.41 mg/dL (H)).  No results found for: AMMONIA  Results from last 7 days   Lab Units 11/10/22  1433   TROPONIN T ng/mL 0.016         Lab Results   Component Value Date    HGBA1C 4.70 (L) 11/10/2022     Lab Results   Component Value Date    TSH 0.960 11/10/2022     No results found for: PREGTESTUR, PREGSERUM, HCG, HCGQUANT  Pain Management Panel    There is no flowsheet data to display.           ---------------------------------------------------------------------------------------------------------------------  Imaging Results (Last 7 Days)     Procedure Component Value Units Date/Time    CT Lower Extremity Right Without Contrast [[id number]] Resulted: 11/10/22 2229     Updated: 11/10/22 2246    CT Chest Without Contrast Diagnostic [[id number]] Collected: 11/10/22 1720     Updated: 11/10/22 1723    Narrative:      CT Chest WO    CLINICAL HISTORY: soa.    COMPARISON: None.    TECHNIQUE:  CT chest without contrast. Coronal and sagittal reconstructions were obtained.  Radiation dose reduction techniques included automated exposure control or exposure modulation based on body size. Count of known CT and cardiac nuc med studies  performed in previous 12 months: 0.     FINDINGS:    Lungs: Bibasilar subsegmental atelectasis. No focal consolidation or mass. Small calcified pulmonary nodule in left upper lobe, likely pulmonary granuloma.    Pleura: No pleural effusions.    Cardiovascular: Normal heart size. No pericardial effusion. Normal course and caliber of the thoracic aorta.    Mediastinum: Evaluation is limited by lack of intravenous contrast. Small calcified hilar mediastinal lymph nodes.    Osseous: No acute osseous abnormality. Degenerative changes of the thoracic spine and dextroconvex curvature of the thoracic spine.    Other: None.    Upper abdomen: Small hiatal hernia.       Impression:        1.  No acute cardiopulmonary disease.   2.  Bibasilar subsegmental atelectasis.  3.  Small hiatal hernia.    Signer Name: Mark Bird MD   Signed: 11/10/2022 5:20 PM   Workstation Name: [id number]    [facility]    CT Abdomen Pelvis Without Contrast [[id number]] Collected: 11/10/22 1717     Updated: 11/10/22 1719    Narrative:      CT Abdomen Pelvis WO    INDICATION:   abd pain    TECHNIQUE:   CT of the abdomen and pelvis without IV contrast. Coronal and sagittal reconstructions were obtained.  Radiation dose reduction techniques included automated exposure control or exposure modulation based on body size. Count of known CT and cardiac nuc  med studies performed in previous 12 months: 0.     COMPARISON:   None available.    FINDINGS:  Evaluation of abdominal viscera is limited due to lack of intravenous contrast. Study is also degraded by patient body habitus.    Lower chest: Unremarkable.  Liver: Mildly nodular in contour. No solid mass.  Gallbladder and bile ducts: Surgically  absent gallbladder. No biliary dilatation.  Spleen: Borderline enlarged with scattered punctate calcifications, likely sequelae of old granulomatous disease.  Pancreas: Diffusely atrophic.  Adrenals: Unremarkable.  Kidneys and ureters: No renal or ureteral stones. No hydronephrosis.  Bowel: Postsurgical changes of the stomach. Nondilated bowel with no wall thickening.Appendix not visualized, but no inflammatory changes present in the right lower quadrant.  Bladder: Underdistended.  Reproductive organs: Normal uterus. No adnexal masses.  Lymph nodes: Unremarkable.  Peritoneum: Unremarkable.  Vessels: Prominent portosystemic collateral vessels.  Abdominal wall: Unremarkable.  Bones: Multilevel degenerative changes of the lumbar spine. Levoconvex curvature of the lumbar spine..      Impression:        1.  Exam is limited by lack of intravenous contrast and patient body habitus.  2.  No acute intra-abdominal abnormality.  3.  Mildly nodular contour of the liver which may reflect early cirrhosis. Recommend correlation with liver function tests.  4.  Borderline splenomegaly.    Signer Name: Mark Bird MD   Signed: 11/10/2022 5:17 PM   Workstation Name: [id number]    [facility]    XR Chest 1 View [[id number]] Collected: 11/10/22 1615     Updated: 11/10/22 1617    Narrative:      EXAM:    XR Chest, 1 View     EXAM DATE:    11/10/2022 3:13 PM     CLINICAL HISTORY:    cough     TECHNIQUE:    Frontal view of the chest.     COMPARISON:    No relevant prior studies available.     FINDINGS:    Lungs:  Pulmonary vascular congestion.  Right basilar airspace  disease.    Pleural space:  Unremarkable.  No pneumothorax.    Heart:  Marked cardiomegaly.    Mediastinum:  Unremarkable.    Bones/joints:  Unremarkable.       Impression:      1.  Cardiomegaly and pulmonary vascular congestion.  2.  Right basilar airspace disease.     This report was finalized on 11/10/2022 4:15 PM by Dr. Carlos Manuel King MD.           I  have personally reviewed the above radiology images and read the final radiology report on 11/10/22  ---------------------------------------------------------------------------------------------------------------------  Assessment / Plan     Active Hospital Problems    Diagnosis  POA   • **Acute on chronic renal failure (HCC) [N17.9, N18.9]  Yes       ASSESSMENT/PLAN:  -Severe sepsis criteria 2/2 undetermined etiology at present, POA  -Cellulitis of right foot and possible recurrent versus persistent septic arthritis of right knee joint , POA  -History of MRSA infection of right knee joint s/p washout  -History of pyogenic arthritis of right knee joint 07/2022  -Reported recent nausea, vomiting, and diarrhea  -Meets severe sepsis criteria with HR >90, C-RP 20.13, WBC 13.35, lactate 5.0, and procalcitonin 1.13.   -Urinalysis with no evidence of acute UTI.   -No acute intra-abdominal findings on CT abdomen/pelvis.  -CT chest with bibasilar atelectasis, but no evidence of opacities or consolidations to suggest pneumonia.  -Reports history of spontaneous MRSA infection of right knee joint requiring arthroscopic washout at [place] and treated with extended course of antibiotics. Seen by [facility] 07/2022 to have joint aspiration performed and diagnosed with pyogenic arthritis of right knee; patient denies completing course of antibiotics.   -Received IV Vancomycin and IV Merrem in ED; will continue on admission while awaiting culture results.   -Received sepsis bolus (2055 mL) in ED; continuous IV fluids ordered.   -Infectious disease and orthopedic surgery consults ordered, input and assistance much appreciated.   -Will monitor off imodium and if diarrhea reoccurs may consider further work-up for C. Diff.  -Obtain venous doppler of RLE to r/o DVT.    -Obtain respiratory panel PCR.  -Repeat a.m. CBC and C-RP.Trend lactate until normalized. Follow-up on blood cultures.     -Acute on chronic kidney  disease, POA  -Creatinine upon arrival 4.41; per verbal report from ED, creatinine preivously 1.34.   -ED provider discussed with on-call nephrology recommending NS @ 125 mL/hr after sepsis bolus.  -Formal inpatient nephrology consult ordered, input and assistance much appreciated.   -No evidence of obstructive uropathy on CT abdomen/pelvis.   -Avoid nephrotoxins as able; will hold home Bumex.   -Repeat a.m. CMP.     -Documented history of CHF  -No previous echo on file; obtain a.m. TTE.     -Essential hypertensin  -BP currently stable.   -Monitor per hospital protocol.   -Review home medications once reconciled.     -Macrocytic anemia  -Thrombocytopenia  -Possible 2/2 SUZETTE versus liver cirrhosis.   -Baseline unknown.   -Obtain iron panel, vitamin B12, and folate levels.   -Obtain peripheral blood smear.   -Repeat a.m. CBC.     -Liver cirrhosis   -Chronic hepatitis C  -Hypoalbuminemia  -Liver enzymes normal.   -May benefit from referral to hepatology/GI at discharge.  -Repeat a.m. CMP.     -Hypothyoridism  -TSH normal.   -Resume home Synthroid.     -Morbid obesity, BMI 57.25 kg/m2  -S/P gastric sleeve 2019  -Complicates all aspects of care  -------------------------------  F/E/N: Continuous NS @ 125 mL/hr. Replace electrolytes as needed. Regular diet.   DVT prophylaxis: SQ Heparin   Activity: Up with assistance; fall precautions  -------------------------------  CODE STATUS: Full    High risk secondary to sepsis of undetermined etiology at present, cellulitis of RLE, possible recurrent vs persistent septic arthritis of right knee joint, acute on CKD  -------------------------------  Expected length of stay:  INPATIENT status due to the need for care which can only be reasonably provided in an hospital setting such as aggressive/expedited ancillary services and/or consultation services, the necessity for IV medications, close physician monitoring and/or the possible need for procedures.  In such, I feel patient's risk  for adverse outcomes and need for care warrant INPATIENT evaluation and predict the patient's care encounter to likely last beyond 2 midnights.     Disposition: Pending clinical course    Maria Isabel Draper PA-C  11/10/22  22:50 EST    ---------------------------------------------------------------------------------------------------------------------           Electronically signed by Miguel Licea MD at 11/10/22 9989       Vital Signs (last day)     Date/Time Temp Temp src Pulse Resp BP Patient Position SpO2    11/17/22 1200 -- -- 102 19 114/63 -- 93    11/17/22 1100 -- -- 98 16 107/66 -- 94    11/17/22 1000 -- -- 95 16 116/64 -- 93    11/17/22 0900 -- -- 93 16 98/66 -- 92    11/17/22 0818 -- -- 100 -- 119/73 -- --    11/17/22 0800 98.6 (37) Axillary 96 18 104/63 -- 92    11/17/22 0700 -- -- 98 16 120/70 -- 95    11/17/22 0600 -- -- 97 15 112/80 Lying 93    11/17/22 0500 -- -- 98 21 114/67 Lying 94    11/17/22 0400 98.2 (36.8) Oral 88 13 90/48 Lying 92    11/17/22 0300 -- -- 93 21 119/67 Lying 94    11/17/22 0200 -- -- 93 20 122/69 Lying 94    11/17/22 0100 -- -- 92 13 111/76 Lying 94    11/17/22 0000 98.5 (36.9) Oral 87 14 107/62 Lying 93    11/16/22 2300 -- -- 81 14 117/84 Lying 93    11/16/22 2200 -- -- 86 13 114/58 Lying 97    11/16/22 2100 -- -- 92 12 103/59 Lying 95    11/16/22 2010 -- -- 87 16 119/70 -- --    11/16/22 2000 98.1 (36.7) Oral 80 14 105/59 Lying 94    11/16/22 1900 -- -- 81 15 110/55 Lying 93    11/16/22 1700 -- -- 92 16 101/55 -- --    11/16/22 1603 -- -- 82 17 95/62 -- 96    11/16/22 1600 98.4 (36.9) Oral -- -- -- -- --    11/16/22 1500 -- -- 79 16 -- -- 95    11/16/22 1403 -- -- 90 22 97/52 -- --    11/16/22 1300 -- -- 84 21 97/50 -- 96    11/16/22 1204 -- -- 81 12 105/65 -- 100    11/16/22 1200 97.9 (36.6) Oral -- -- -- -- --    11/16/22 1103 -- -- 86 13 92/61 -- 99    11/16/22 1003 -- -- 84 21 92/62 -- --    11/16/22 0907 -- -- -- -- 121/83 -- --    11/16/22 0900 -- -- 97 13  128/79 -- 95    11/16/22 0835 -- -- 105 17 128/76 -- 96    11/16/22 0800 98.5 (36.9) Oral -- -- -- -- --    11/16/22 0700 -- -- 107 16 114/71 -- 93    11/16/22 0444 -- -- 111 18 104/62 Lying 95    11/16/22 0400 98.6 (37) Oral -- -- -- -- --    11/16/22 0200 -- -- 111 18 133/97 Lying 95    11/16/22 0000 98.7 (37.1) Oral 107 18 127/74 Lying 96          Intake & Output (last day)       11/16 0701  11/17 0700 11/17 0701 11/18 0700    P.O. 720 300    I.V. (mL/kg)  0 (0)    Total Intake(mL/kg) 720 (2.5) 300 (1)    Urine (mL/kg/hr) 1500 (0.2)     Total Output 1500     Net -780 +300              Lines, Drains & Airways     Active LDAs     Name Placement date Placement time Site Days    PICC Double Lumen 11/15/22 Right Basilic 11/15/22  name and date of birth verified  [birth year]  Basilic  1    External Urinary Catheter 11/11/22  0153  --  6                  Current Facility-Administered Medications   Medication Dose Route Frequency Provider Last Rate Last Admin   • benzonatate (TESSALON) capsule 100 mg  100 mg Oral Q4H PRN Julius Jones MD   100 mg at 11/17/22 1150   • bumetanide (BUMEX) tablet 2 mg  2 mg Oral Daily Michael Roman DO       • cetirizine (zyrTEC) tablet 10 mg  10 mg Oral BID PRN Ann Marie Ochoa DO       • diphenhydrAMINE (BENADRYL) capsule 25 mg  25 mg Oral Nightly PRN Ann Marei Ochoa DO   25 mg at 11/14/22 2344   • docusate sodium (COLACE) capsule 100 mg  100 mg Oral BID PRN Ann Marie Ochoa DO       • fluticasone (FLONASE) 50 MCG/ACT nasal spray 2 spray  2 spray Nasal Daily Ann Marie Ochoa DO   2 spray at 11/17/22 0823   • folic acid (FOLVITE) tablet 500 mcg  500 mcg Oral Daily Farhana Hampton MD   500 mcg at 11/17/22 0818   • guaiFENesin-dextromethorphan (ROBITUSSIN DM) 100-10 MG/5ML syrup 5 mL  5 mL Oral Q4H PRN Michael Roman DO   5 mL at 11/17/22 0633   • heparin (porcine) 5000 UNIT/ML injection 5,000 Units  5,000 Units Subcutaneous Q12H Miguel Licea MD   5,000 Units at 11/17/22 0817   •  hydrOXYzine (ATARAX) tablet 50 mg  50 mg Oral TID PRN Ann Marie Ochoa DO       • levothyroxine (SYNTHROID, LEVOTHROID) tablet 100 mcg  100 mcg Oral Daily Ann Marie Ochoa DO   100 mcg at 11/17/22 0819   • loperamide (IMODIUM) capsule 2 mg  2 mg Oral Once Michael Roman DO       • metoprolol tartrate (LOPRESSOR) tablet 12.5 mg  12.5 mg Oral Q12H Ann Marie Ochoa DO   12.5 mg at 11/17/22 0818   • morphine injection 2 mg  2 mg Intravenous Q4H PRN Miguel Licea MD   2 mg at 11/17/22 1010   • nitroglycerin (NITROSTAT) SL tablet 0.4 mg  0.4 mg Sublingual Q5 Min PRN Miguel Licea MD   0.4 mg at 11/16/22 0907   • nystatin (MYCOSTATIN) 333328 UNIT/GM cream 1 application  1 application Topical BID Ann Marie Ochoa DO   1 application at 11/17/22 0825   • nystatin (MYCOSTATIN) powder 1 application  1 application Topical BID PRN Ann Marie Ochoa DO       • oxyCODONE (ROXICODONE) immediate release tablet 10 mg  10 mg Oral TID PRN Michael Roman DO       • oxyCODONE (ROXICODONE) immediate release tablet 15 mg  15 mg Oral 4x Daily Ann Marie Ochoa DO   15 mg at 11/17/22 1152   • pantoprazole (PROTONIX) EC tablet 40 mg  40 mg Oral QAM Ann Marie Ochoa DO   40 mg at 11/17/22 0633   • phenol (CHLORASEPTIC) 1.4 % liquid 1 spray  1 spray Mouth/Throat Q2H PRN Michael Roman DO   1 spray at 11/15/22 1851   • prochlorperazine (COMPAZINE) injection 10 mg  10 mg Intravenous Q6H PRN Julius Jones MD   10 mg at 11/16/22 0229   • sodium chloride 0.9 % flush 10 mL  10 mL Intravenous PRN Miguel Licea MD       • sodium chloride 0.9 % flush 10 mL  10 mL Intravenous Q12H Miguel Licea MD   10 mL at 11/17/22 0825   • sodium chloride 0.9 % flush 10 mL  10 mL Intravenous PRN Miguel Licea MD       • sodium chloride 0.9 % flush 10 mL  10 mL Intravenous Q12H Ann Marie Ochoa DO   10 mL at 11/17/22 0825   • sodium chloride 0.9 % flush 10 mL  10 mL Intravenous PRN Ann Marie Ochoa DO       • sodium chloride 0.9  % flush 10 mL  10 mL Intravenous Q12H Michael Roman DO   10 mL at 22 0825   • sodium chloride 0.9 % flush 10 mL  10 mL Intravenous Q12H Michael Roman DO   10 mL at 22 0824   • sodium chloride 0.9 % flush 10 mL  10 mL Intravenous PRN Michael Roman DO       • sodium chloride 0.9 % flush 20 mL  20 mL Intravenous PRN Michael Roman DO       • traZODone (DESYREL) tablet 50 mg  50 mg Oral Nightly PRN Ann Marie Ochoa DO       • vancomycin 1250 mg/250 mL 0.9% NS IVPB ([facility])  1,250 mg Intravenous Q24H Kane Winter MD   1,250 mg at 22 2145   • zinc oxide 20 % ointment   Topical BID Ann Marie Ochoa DO   Given at 22 0825     Operative/Procedure Notes (most recent note)    No notes of this type exist for this encounter.            Physician Progress Notes (most recent note)      Kane Winter MD at 22 1022                     PROGRESS NOTE         Patient Identification:  Name:  Malina Velasquez  Age:  57 y.o.  Sex:  female  :  1965  MRN:  [id number]  Visit Number:  [id number]  Primary Care Provider:  Jeremiah Topete MD         LOS: 7 days       ----------------------------------------------------------------------------------------------------------------------  Subjective       Chief Complaints:    Weakness - Generalized (Pt states dr sent her here for high wbc and kidney function )        Interval History:      Patient is resting in bed on room air in mild respiratory distress.  Seems very short of breath with talking and is coughing very frequently.  Denies any production of sputum.  Denies fever or chills.  Denies abdominal pain, nausea, vomiting, or diarrhea.  Lungs are mildly diminished bilaterally, but overall clear.  Afebrile.  WBC is worsened from 12.16 up to 14.19.    Review of Systems:    Constitutional: no fever, chills and night sweats.  Generalized fatigue.  Eyes: no eye drainage, itching or redness.  HEENT: no mouth sores, dysphagia or nose  bleed.  Respiratory: Shortness of breath and nonproductive cough.  Cardiovascular: no chest pain, no palpitations, no orthopnea.  Gastrointestinal: no nausea, vomiting or diarrhea. No abdominal pain, hematemesis or rectal bleeding.  Genitourinary: no dysuria or polyuria.  Hematologic/lymphatic: no lymph node abnormalities, no easy bruising or easy bleeding.  Musculoskeletal: Right lower extremity pain.  Skin: No rash and no itching.  Neurological: no loss of consciousness, no seizure, no headache.  Behavioral/Psych: no depression or suicidal ideation.  Endocrine: no hot flashes.  Immunologic: negative.    ----------------------------------------------------------------------------------------------------------------------      Objective       Current [facility] Meds:  fluticasone, 2 spray, Nasal, Daily  folic acid, 500 mcg, Oral, Daily  heparin (porcine), 5,000 Units, Subcutaneous, Q12H  levothyroxine, 100 mcg, Oral, Daily  loperamide, 2 mg, Oral, Once  metoprolol tartrate, 12.5 mg, Oral, Q12H  nystatin, 1 application, Topical, BID  oxyCODONE, 15 mg, Oral, 4x Daily  pantoprazole, 40 mg, Oral, QAM  sodium chloride, 10 mL, Intravenous, Q12H  sodium chloride, 10 mL, Intravenous, Q12H  sodium chloride, 10 mL, Intravenous, Q12H  sodium chloride, 10 mL, Intravenous, Q12H  vancomycin, 1,250 mg, Intravenous, Q24H  zinc oxide, , Topical, BID         ----------------------------------------------------------------------------------------------------------------------    Vital Signs:  Temp:  [97.9 °F (36.6 °C)-98.6 °F (37 °C)] 98.6 °F (37 °C)  Heart Rate:  [] 100  Resp:  [12-22] 16  BP: ()/(48-84) 119/73  Mean Arterial Pressure (Non-Invasive) for the past 24 hrs (Last 3 readings):   Noninvasive MAP (mmHg)   11/17/22 0700 90   11/17/22 0600 89   11/17/22 0500 83     SpO2 Percentage    11/17/22 0500 11/17/22 0600 11/17/22 0700   SpO2: 94% 93% 95%     SpO2:  [92 %-100 %] 95 %  on   ;   Device (Oxygen Therapy): room  air    Body mass index is 91.11 kg/m².  Wt Readings from Last 3 Encounters:   11/17/22 (!) 288 kg (635 lb)        Intake/Output Summary (Last 24 hours) at 11/17/2022 1022  Last data filed at 11/17/2022 0800  Gross per 24 hour   Intake 660 ml   Output 1500 ml   Net -840 ml     Diet Regular Texture (IDDSI 7); Regular Consistency; Cardiac Diets, Renal Diets; Healthy Heart (2-3 Na+); Low Sodium (2-3g)  ----------------------------------------------------------------------------------------------------------------------      Physical Exam:    Constitutional:  female is sitting up in bed in mild respiratory distress.  HENT:  Head: Normocephalic and atraumatic.  Mouth:  Moist mucous membranes.    Eyes:  Conjunctivae and EOM are normal.  No scleral icterus.  Neck:  Neck supple.  No JVD present.    Cardiovascular:  Normal rate, regular rhythm and normal heart sounds with no murmur. No edema.  Pulmonary/Chest:  No wheezes, no crackles, no rales, with mildly diminished breath sounds bilaterally, but overall clear.  Abdominal:  Soft.  Bowel sounds are normal.  No distension and no tenderness.   Musculoskeletal: Bilateral lower extremity lymphedema.  Right lower extremity in Ace wrap this morning.  Neurological:  Alert and oriented to person, place, and time.  No facial droop.  No slurred speech.   Skin:  Skin is warm and dry.  No rash noted.  No pallor. Bilateral lower extremity lymphedema.    Psychiatric:  Normal mood and affect.  Behavior is normal.      ----------------------------------------------------------------------------------------------------------------------  Results from last 7 days   Lab Units 11/16/22  0912 11/16/22  0506 11/10/22  1433   TROPONIN T ng/mL 0.014 <0.010 0.016           Results from last 7 days   Lab Units 11/17/22  0918 11/16/22  0012 11/15/22  0019 11/13/22  0053 11/12/22  1015 11/12/22  0832 11/12/22  0433 11/11/22  1226 11/11/22  0648 11/11/22  0045 11/10/22  1800 11/10/22  1546  11/10/22  1433   CRP mg/dL  --   --   --   --  14.42*  --   --   --   --  17.21*  --   --  20.13*   LACTATE mmol/L  --   --   --   --   --  3.7*  --  3.9* 4.6* 5.0*   < >  --  5.0*   WBC 10*3/mm3 14.19* 12.16* 11.11*   < >  --   --    < >  --  9.70 10.93*  --   --  13.35*   HEMOGLOBIN g/dL 8.2* 8.4* 8.2*   < >  --   --    < >  --  8.7* 9.0*  --   --  9.5*   HEMATOCRIT % 24.3* 25.1* 25.8*   < >  --   --    < >  --  27.3* 28.1*  --   --  29.4*   MCV fL 96.4 96.2 101.6*   < >  --   --    < >  --  101.5* 99.3*  --   --  97.7*   MCHC g/dL 33.7 33.5 31.8   < >  --   --    < >  --  31.9 32.0  --   --  32.3   PLATELETS 10*3/mm3 56* 42* 32*   < >  --   --    < >  --  43* 47*  --   --  56*   INR   --   --   --   --   --   --   --   --   --   --   --  1.12*  --     < > = values in this interval not displayed.     Results from last 7 days   Lab Units 11/17/22  0918 11/17/22  0042 11/16/22  0012 11/15/22  0019 11/14/22  1435 11/12/22  0433 11/11/22  0648 11/11/22  0045 11/10/22  1433   SODIUM mmol/L 132*  --  131* 130*  --    < > 130* 132* 133*   POTASSIUM mmol/L 4.1  --  3.8 3.8  --    < > 5.6* 4.4 5.2   MAGNESIUM mg/dL  --  1.9  --  1.5* 1.5*  --   --   --   --    CHLORIDE mmol/L 97*  --  96* 97*  --    < > 96* 95* 94*   CO2 mmol/L 27.6  --  25.4 24.3  --    < > 18.2* 21.0* 25.9   BUN mg/dL 35*  --  40* 44*  --    < > 69* 68* 67*   CREATININE mg/dL 1.87*  --  1.99* 2.26*  --    < > 4.30* 4.53* 4.41*   CALCIUM mg/dL 7.9*  --  7.8* 7.6*  --    < > 8.5* 8.8 9.1   GLUCOSE mg/dL 120*  --  143* 118*  --    < > 112* 125* 114*   ALBUMIN g/dL  --   --   --   --   --   --  1.58* 2.11* 2.68*   BILIRUBIN mg/dL  --   --   --   --   --   --  0.7 0.7 0.8   ALK PHOS U/L  --   --   --   --   --   --  101 116 134*   AST (SGOT) U/L  --   --   --   --   --   --  34* 25 22   ALT (SGPT) U/L  --   --   --   --   --   --  10 11 9    < > = values in this interval not displayed.   Estimated Creatinine Clearance: 81.7 mL/min (A) (by C-G formula based on  SCr of 1.87 mg/dL (H)).  No results found for: AMMONIA    No results found for: HGBA1C, POCGLU  Lab Results   Component Value Date    HGBA1C 4.70 (L) 11/10/2022     Lab Results   Component Value Date    TSH 0.960 11/10/2022       Blood Culture   Date Value Ref Range Status   11/10/2022 No growth at 24 hours  Preliminary   11/10/2022 No growth at 24 hours  Preliminary     No results found for: URINECX  No results found for: WOUNDCX  No results found for: STOOLCX  No results found for: RESPCX  Pain Management Panel     Pain Management Panel Latest Ref Rng & Units 11/11/2022    AMPHETAMINES SCREEN, URINE Negative Negative    BARBITURATES SCREEN Negative Negative    BENZODIAZEPINE SCREEN, URINE Negative Negative    BUPRENORPHINEUR Negative Negative    COCAINE SCREEN, URINE Negative Negative    METHADONE SCREEN, URINE Negative Negative    METHAMPHETAMINEUR Negative Negative            ----------------------------------------------------------------------------------------------------------------------  Imaging Results (Last 24 Hours)     ** No results found for the last 24 hours. **          ----------------------------------------------------------------------------------------------------------------------    Pertinent Infectious Disease Results    Lactic acid 5.0 on admission.  Procalcitonin 0.87.  Urinalysis unremarkable.  COVID-19 and influenza PCR negative.  Blood cultures from 11/10/2022 show no growth thus far.  CT of the abdomen pelvis from 11/10/2022 reports no acute intra-abdominal abnormality, mildly nodular contour of the liver may represent early cirrhosis, borderline splenomegaly, CT of the chest from 11/10/2022 reports no acute cardiopulmonary disease, bibasilar subsegmental atelectasis, small hiatal hernia.  Right lower extremity CT reports advanced cellulitis of the right lower extremity extending from the level of the proximal thigh through the foot with maximum thickness of the inflammatory change and  stranding medially measuring greater than 15 cm at the level of the distal thigh and knee, no drainable fluid collection no subcutaneous gas, no focal erosive changes to suggest osteomyelitis at this time. GI PCR negative.  Urinalysis from 11/11/2022 unremarkable.  Right lower extremity venous duplex negative for DVT.  Right lower extremity erythema improved but continues with significant edema.    Chest x-ray from 11/16/2022 reports improved aeration specifically of the left lung base, tiny bilateral pleural effusions.     Based on previous culture with MRSA from joint aspiration at outside facility recommend to continue vancomycin monotherapy.  Patient will require prolonged course of antibiotic therapy for chronic osteomyelitis of the right knee secondary to complication from recurrent partially treated right knee septic arthritis and will need prolonged course of IV antibiotic therapy x8 weeks followed by oral suppressive therapy with very grim prognosis regarding clearing the infection without repetitive washout and aspiration.  Per orthopedic surgery risk outweighs benefit for surgical intervention at this time.        Assessment/Plan       Assessment     Septic shock with lactic acid greater than 4 on admission  History of right knee pyogenic arthritis with MRSA with superimposed right knee chronic osteomyelitis  Right lower extremity cellulitis and lymphedema        Plan      I have seen and examined the patient myself this morning and discussed the plan of care with Tori Ramos PA-C and here are my findings:    Patient is resting comfortably in bed on room air with mild respiratory distress seems to be short of breath and having coughing episodes but no production of sputum.  No fever or chills and no diarrhea reported and denies any abdominal pain.  Lungs are clear to auscultation with transmitted rhonchi at times.  Her right lower extremity is wrapped with compressive Ace wrap.    Laboratory wise  WBC is worsened from 12.16 up to 14.19 and I am concerned about new onset of her coughing but has been addressed with primary team and chest x-ray showed improved aeration especially to the left lung base with tiny bilateral pleural effusion.    Patient continues on vancomycin x8 more weeks followed by suppressive oral therapy afterwards.      Code Status:   Code Status and Medical Interventions:   Ordered at: 11/10/22 1939     Level Of Support Discussed With:    Patient     Code Status (Patient has no pulse and is not breathing):    CPR (Attempt to Resuscitate)     Medical Interventions (Patient has pulse or is breathing):    Full Support       Tori Ramos PA-C  11/17/22  10:22 EST     Electronically signed by Tori Ramos PA-C, 11/17/22, 10:25 AM EST.      Electronically signed by Kane Winter MD, 11/17/22, 10:35 AM EST.      Electronically signed by Kane Winter MD at 11/17/22 1037          Consult Notes (most recent note)      Farhana Hampton MD at 11/14/22 1329      Consult Orders    1. Inpatient Hematology & Oncology Consult [[id number]] ordered by Michael Roman DO at 11/14/22 0758               Date:  11/14/22  Referring Provider:   Reason for Consultation:     Patient Care Team:  Jeremiah Topete MD as PCP - General (Family Medicine)    Chief complaint:       History of present illness:  Malina Velasquez is a 57 y.o. year-old female seen today at the request of Dr. Licea  for evaluation and treatment of anemia and thrombocytopenia.     57-year-old female with a history of congestive heart failure, hypertension, mitral valve prolapse, COPD, hepatitis C with presumed cirrhosis who presented to the emergency room 11/10 due to generalized weakness due to sepsis.  Sepsis thought to be due to right knee osteoarthritis.  Patient noted erythema of the right knee 3 days prior to presentation.  On presentation she was also noted to have acute kidney injury, also noted to have anemia  and thrombocytopenia.  We are consulted due to possibility of HUS/TTP.    On 11/10 her white count was noted to be 13.35, hemoglobin 9.5, hematocrit 29.4, MCV 97.7, platelets 56.  Work-up there as far has shown iron studies with iron 51, TIBC low at 155, vitamin B12 elevated at 1932, folate 4.94, , haptoglobin 123 peripheral smear with moderate macrocytic anemia with rare nucleated red cells..  No schistocytes identified.  Relative neutrophilia.  Marked thrombocytopenia with normal platelet morphology.  No platelet clumps identified.  No blasts or dysplastic white cells noted.    She reports being told she has anemia three years ago when she had developed septic arthritis of her knee.   Thinks she may have required transfusions during this time period. Also thinks she may have had a bone marrow biopsy under anesthesia, but unsure. Denies following up with a hematologist. Denies bleeding or easy bruising aside from this hospitalization. Has been told she needs to be on folic acid, but stopped taking it due to feeling overwhelmed with her medications. Denies alcohol or drug use. Thinks Minda VITAL is from a tattoo in 1980s.       History  Past Medical History:   Diagnosis Date   • CHF (congestive heart failure) (HCC)    • COPD (chronic obstructive pulmonary disease) (HCC)    • Edema    • Hep C w/o coma, chronic (HCC)    • Hypertension    • Mitral valve prolapse        Past Surgical History:   Procedure Laterality Date   • GASTRIC BANDING     • JOINT ASPIRATION AND/OR INJECTION Right 07/2022       Family History   Problem Relation Age of Onset   • Hypertension Mother    • Breast cancer Mother    • COPD Father        Social History     Tobacco Use   • Smoking status: Never   Vaping Use   • Vaping Use: Never used   Substance Use Topics   • Alcohol use: Not Currently   • Drug use: Never       Allergies:  Sulfa antibiotics, Nsaids, and Penicillins    Outpatient Medications:  Medications Prior to Admission   Medication  Sig Dispense Refill Last Dose   • bumetanide (BUMEX) 2 MG tablet Take 1 tablet by mouth 2 (Two) Times a Day.   11/10/2022   • cetirizine (zyrTEC) 10 MG tablet Take 1 tablet by mouth 2 (Two) Times a Day As Needed for Allergies.   Past Week   • diphenhydrAMINE (BENADRYL) 25 mg capsule Take 1 capsule by mouth At Night As Needed for Sleep. Prior to [facility], Patient was on:  1 to 2 caps at bedtime as needed   Past Week   • docusate sodium (COLACE) 100 MG capsule Take 1 capsule by mouth 2 (Two) Times a Day As Needed for Constipation.   Past Week   • fluticasone (FLONASE) 50 MCG/ACT nasal spray 2 sprays into the nostril(s) as directed by provider Daily.   11/10/2022   • hydrOXYzine (ATARAX) 50 MG tablet Take 1 tablet by mouth 3 (Three) Times a Day As Needed for Anxiety.   Past Week   • levothyroxine (SYNTHROID, LEVOTHROID) 100 MCG tablet Take 1 tablet by mouth Daily.   11/10/2022   • metoprolol tartrate (LOPRESSOR) 50 MG tablet Take 12.5 mg by mouth 2 (Two) Times a Day As Needed. Prior to [facility] Ad[facility], Patient was on:  if heart rate greater than 100   Past Month   • nystatin (MYCOSTATIN) 352322 UNIT/GM cream Apply 1 application topically to the appropriate area as directed 2 (Two) Times a Day. Prior to [facility] Admission, Patient was on:  Mixes with Zinc oxide, uses on stomach folds   11/10/2022   • nystatin (MYCOSTATIN) 393385 UNIT/GM powder Apply 1 application topically to the appropriate area as directed 2 (Two) Times a Day As Needed. Prior to [facility] Admission, Patient was on:  stomach folds   11/10/2022   • omeprazole (priLOSEC) 40 MG capsule Take 1 capsule by mouth Daily.   11/10/2022   • oxyCODONE (ROXICODONE) 15 MG immediate release tablet Take 1 tablet by mouth 4 (Four) Times a Day.   11/10/2022   • oxyCODONE (ROXICODONE) 5 MG immediate release tablet Take 1 tablet by mouth 2 (Two) Times a Day As Needed for Moderate Pain. Prior to [facility] Admission, Patient was on:  breakthrough pain   11/10/2022   •  traZODone (DESYREL) 50 MG tablet Take 1 tablet by mouth At Night As Needed for Sleep. Prior to [facility] Admission, Patient was on:  1 to 2 tabs at bedtime as needed   Past Month   • zinc oxide 13 % cream cream Apply 1 application topically to the appropriate area as directed 2 (Two) Times a Day. Prior to [facility] Admission, Patient was on:  mixes with Nystatin, uses on stomach folds.   11/10/2022       Inpatient Medications:  Scheduled Meds:  fluticasone, 2 spray, Nasal, Daily  heparin (porcine), 5,000 Units, Subcutaneous, Q12H  levothyroxine, 100 mcg, Oral, Daily  metoprolol tartrate, 12.5 mg, Oral, Q12H  nystatin, 1 application, Topical, BID  oxyCODONE, 15 mg, Oral, 4x Daily  pantoprazole, 40 mg, Oral, QAM  sodium chloride, 10 mL, Intravenous, Q12H  sodium chloride, 10 mL, Intravenous, Q12H  vancomycin, 1,250 mg, Intravenous, Q24H  zinc oxide, , Topical, BID        Continuous Infusions:  lactated ringers, 50 mL/hr, Last Rate: 50 mL/hr (11/13/22 1803)        PRN Meds:  •  cetirizine  •  diphenhydrAMINE  •  docusate sodium  •  hydrOXYzine  •  Morphine  •  nitroglycerin  •  nystatin  •  oxyCODONE  •  sodium chloride  •  sodium chloride  •  sodium chloride  •  traZODone    Review of Systems  A comprehensive 14 point review of systems was performed.  Significant findings as mentioned above.  All other systems reviewed and are negative.       Physical Exam:  Vital Signs   Patient Vitals for the past 24 hrs:   BP Temp Temp src Pulse Resp SpO2 Weight   11/14/22 1200 -- 97.9 °F (36.6 °C) Oral -- -- -- --   11/14/22 1103 -- -- -- 101 16 100 % --   11/14/22 1003 109/83 -- -- 95 17 95 % --   11/14/22 0903 104/84 -- -- 95 16 96 % --   11/14/22 0803 109/58 -- -- 96 13 94 % --   11/14/22 0800 -- 98.3 °F (36.8 °C) Oral -- -- -- --   11/14/22 0700 117/78 -- -- 94 16 91 % --   11/14/22 0400 -- 98.5 °F (36.9 °C) Oral -- -- -- (!) 288 kg (635 lb 12.9 oz)   11/14/22 0200 119/71 -- -- 92 20 97 % --   11/14/22 0100 105/56 -- -- 92 20 92 %  --   11/14/22 0000 94/57 98.7 °F (37.1 °C) Oral 91 20 97 % --   11/13/22 2300 112/71 -- -- 90 20 95 % --   11/13/22 2200 134/68 -- -- 95 20 93 % --   11/13/22 2100 (!) 118/102 -- -- 98 18 98 % --   11/13/22 2037 103/67 -- -- 95 -- -- --   11/13/22 2000 109/66 99.2 °F (37.3 °C) Oral 93 18 91 % --   11/13/22 1900 116/66 -- -- 91 16 95 % --   11/13/22 1801 110/62 -- -- 90 -- 92 % --   11/13/22 1700 110/60 -- -- 89 -- 96 % --   11/13/22 1600 105/51 98.4 °F (36.9 °C) Oral 92 14 98 % --   11/13/22 1500 109/61 -- -- 81 -- 90 % --   11/13/22 1400 113/66 -- -- 85 -- 97 % --       General:  Awake, alert and oriented, in no distress, morbidly obese   HEENT:  Pupils are equal, round and reactive to light and accommodation  Neck:  No JVD, thyromegaly or lymphadenopathy  CV:  Regular rate and rhythm, no murmurs, rubs or gallops  Resp:  Lungs are clear to auscultation bilaterally  Abd:  Soft, non-tender, non-distended, bowel sounds present, no organomegaly appreciated   Ext:  No clubbing, cyanosis or edema. +multiple ecchymoses R>L upper extremities. R knee wrapped with ACE bandage   Lymph:  No cervical, supraclavicular, axillary, inguinal or femoral adenopathy  Neuro:  MS as above, CN II-XII intact, grossly non-focal exam      Labs / Studies:  Lab Results   Component Value Date    WBC 11.67 (H) 11/14/2022    HGB 8.8 (L) 11/14/2022    HCT 26.4 (L) 11/14/2022    MCV 96.0 11/14/2022    RDW 14.7 11/14/2022    PLT 41 (C) 11/14/2022    NEUTRORELPCT 78.0 (H) 11/12/2022    LYMPHORELPCT 11.3 (L) 11/12/2022    MONORELPCT 7.4 11/12/2022    EOSRELPCT 0.2 (L) 11/12/2022    BASORELPCT 0.3 11/12/2022    NEUTROABS 8.86 (H) 11/13/2022    LYMPHSABS 1.43 11/12/2022       Lab Results   Component Value Date     (L) 11/14/2022    K 3.8 11/14/2022    CO2 24.5 11/14/2022    CL 97 (L) 11/14/2022    BUN 49 (H) 11/14/2022    CREATININE 2.50 (H) 11/14/2022    GLUCOSE 130 (H) 11/14/2022    CALCIUM 8.1 (L) 11/14/2022    ALKPHOS 101 11/11/2022    AST 34  (H) 11/11/2022    ALT 10 11/11/2022    BILITOT 0.7 11/11/2022    ALBUMIN 1.58 (L) 11/11/2022    PROTEINTOT 5.9 (L) 11/11/2022       Imaging Results (Last 72 Hours)     ** No results found for the last 72 hours. **        Peripheral smear:  Pathologist Interpretation Moderate macrocytic anemia with rare nucleated red cell. No schistocytes identified. Relative neutrophilia. Marked thrombocytopenia with normal platelet morphology. No platelet clumps identified. No blasts or dysplastic white cells identified.     Component      Latest Ref Rng & Units 11/11/2022   Iron      37 - 145 mcg/dL 51   Iron Saturation      20 - 50 % 33   Transferrin      200 - 360 mg/dL 104 (L)   TIBC      298 - 536 mcg/dL 155 (L)   Vitamin B-12      211 - 946 pg/mL 1,932 (H)   Folate      4.78 - 24.20 ng/mL 4.94   LDH      135 - 214 U/L 244 (H)   Haptoglobin      30 - 200 mg/dL 123       Assessment & Plan:  Malina Velasquez is a 57 y.o. year-old female presenting with sepsis due to R knee osteoarthritis and SUZETTE. We are consulted due to anemia and thrombocytopenia.      #Macrocytic anemia   - Suspect multifactorial given underlying liver and renal dysfunction and folate deficiency. Unlikely to be hemolyzing given normal LDH and haptoglobin. No schistocytes on peripheral smear noted. Unlikely to be TTP/HUS, but will check [id number]   - CBC on presentation with H/H 9.5/29.4, MCV 97.7   - Peripheral smear with: moderate macrocytic anemia, no schistocytes identified  - LDH elevated to 244, haptoglobin 123   - B12 elevated to 1932, folate level of 4.94  - I started patient on folic acid 500 mcg given low folate level   - Check ferritin level, reticulocyte, soluble transferrin receptor, methylmalonic acid, and erythropoietin level   - Also ordering for a complete U/S of the abdomen to assess for hepatosplenomegaly       #Thrombocytopenia   #SUZETTE   - Suspect due to underlying liver dysfunction/cirrhosis due to chronic untreated Hepatitis C and possible  splenic sequestration   - Unlikely to be TTP/HUS given no schistocytes on peripheral smear and normal LDH/haptoglobin   - Check [id number] level       #Neutrophilic Leukocytosis   -2/2 underlying sepsis   peripheral smear with moderate macrocytic anemia with rare nucleated red cells..  No schistocytes identified.  Relative neutrophilia.  Marked thrombocytopenia with normal platelet morphology.  No platelet clumps identified.  No blasts or dysplastic white cells noted.  - cont to trend       Farhana Hampton MD  22  13:29 EST        Electronically signed by Farhana Hampton MD at 11/15/22 0830          Occupational Therapy Notes (most recent note)      Surekha Martinez, OT at 11/15/22 6028          Patient Name: Malina Velasquez  : 1965    MRN: [id number]                              Today's Date: 11/15/2022       Admit Date: 11/10/2022    Visit Dx:     ICD-10-CM ICD-9-CM   1. Renal failure, unspecified chronicity  N19 [id number]     Patient Active Problem List   Diagnosis   • Acute on chronic renal failure (HCC)     Past Medical History:   Diagnosis Date   • CHF (congestive heart failure) (HCC)    • COPD (chronic obstructive pulmonary disease) (HCC)    • Edema    • Hep C w/o coma, chronic (HCC)    • Hypertension    • Mitral valve prolapse      Past Surgical History:   Procedure Laterality Date   • GASTRIC BANDING     • JOINT ASPIRATION AND/OR INJECTION Right 2022      General Information     Row Name 11/15/22 1523          OT Time and Intention    Document Type evaluation  -LM     Mode of Treatment occupational therapy  -LM     Row Name 11/15/22 1523          General Information    Patient Profile Reviewed yes  -LM     Prior Level of Function max assist:;ADL's;all household mobility;transfer  patient reported that spouse assists with all badl at bedside.  Non ambulatory at home.  Does utilized HB, mark lift, w/c, bedpan.  -LM     Existing Precautions/Restrictions fall;other (see comments)  decreased skin  integrity addressed by NSG and wound care  -     Barriers to Rehab medically complex;previous functional deficit  -     Row Name 11/15/22 1523          Living Environment    People in Home spouse  -     Row Name 11/15/22 1523          Cognition    Orientation Status (Cognition) oriented x 4  -     Row Name 11/15/22 1523          Safety Issues, Functional Mobility    Impairments Affecting Function (Mobility) balance;endurance/activity tolerance;strength  -           User Key  (r) = Recorded By, (t) = Taken By, (c) = Cosigned By    Initials Name Provider Type    LM Surekha Martinez, OT Occupational Therapist                 Mobility/ADL's     Row Name 11/15/22 1525          Transfers    Transfers --  unable to safely assess  -     Row Name 11/15/22 1525          Activities of Daily Living    BADL Assessment/Intervention bathing;upper body dressing;lower body dressing;grooming;feeding;toileting  -[facility] Name 11/15/22 1525          Bathing Assessment/Intervention    [place] Level (Bathing) maximum assist (25% patient effort);dependent (less than 25% patient effort)  -     Row Name 11/15/22 1525          Upper Body Dressing Assessment/Training    [place] Level (Upper Body Dressing) maximum assist (25% patient effort)  -     Row Name 11/15/22 1525          Lower Body Dressing Assessment/Training    [place] Level (Lower Body Dressing) dependent (less than 25% patient effort)  -     Row Name 11/15/22 1525          Grooming Assessment/Training    [place] Level (Grooming) set up  -[facility] Name 11/15/22 1525          Self-Feeding Assessment/Training    [place] Level (Feeding) set up  -[facility] Name 11/15/22 1525          Toileting Assessment/Training    [place] Level (Toileting) dependent (less than 25% patient effort);maximum assist (25% patient effort)  -           User Key  (r) = Recorded By, (t) = Taken By, (c) = Cosigned By    Initials Name Provider Type    LM  Surekha Martinez, OT Occupational Therapist               Obj/Interventions     [facility] Name 11/15/22 1526          Sensory Assessment (Somatosensory)    Sensory Assessment (Somatosensory) sensation intact  -[facility] Name 11/15/22 1526          Vision Assessment/Intervention    Visual Impairment/Limitations WFL  -LM     [facility] Name 11/15/22 1526          Range of Motion Comprehensive    General Range of Motion no range of motion deficits identified  -[facility] Name 11/15/22 1526          Strength Comprehensive (MMT)    General Manual Muscle Testing (MMT) Assessment no strength deficits identified  -[facility] Name 11/15/22 1526          Motor Skills    Motor Skills functional endurance  -LM     Functional Endurance F-  -           User Key  (r) = Recorded By, (t) = Taken By, (c) = Cosigned By    Initials Name Provider Type    LM Surekha Martinez, OT Occupational Therapist               Goals/Plan    No documentation.                Clinical Impression     [facility] Name 11/15/22 1526          Plan of Care Review    Plan of Care Reviewed With patient  -LM     Row Name 11/15/22 1526          Therapy Assessment/Plan (OT)    Patient/Family Therapy Goal Statement (OT) Patient plans to return home with spouse with prior assistance  -     Criteria for Skilled Therapeutic Interventions Met (OT) no;does not meet criteria for skilled intervention;no problems identified which require skilled intervention  Patient at baseline with badl and fxl mobility.  Recommend followup with  services versus outpatient for lymphedema needs.  Plans to return home with infusion therapy per patient.  -     Therapy Frequency (OT) evaluation only  -[facility] Name 11/15/22 1526          Therapy Plan Review/Discharge Plan (OT)    Equipment Needs Upon Discharge (OT) other (see comments)  patient requests new padding for HB and alternative leg rests for current w/c.  Contacted .  -     Anticipated Discharge Disposition (OT) home with 24/7  care;home with home health  -LM     Row Name 11/15/22 1526          Positioning and Restraints    Post Treatment Position bed  -LM     In Bed call light within reach;encouraged to call for assist  -LM           User Key  (r) = Recorded By, (t) = Taken By, (c) = Cosigned By    Initials Name Provider Type    LM Surekha Martinez OT Occupational Therapist               Outcome Measures    No documentation.                   OT Recommendation and Plan  Therapy Frequency (OT): evaluation only  Plan of Care Review  Plan of Care Reviewed With: patient     Time Calculation:     Therapy Charges for Today     Code Description Service Date Service Provider Modifiers Qty    [id number]  OT EVAL MOD COMPLEXITY 4 11/15/2022 Surekha Martinez OT GO 1               Surekha Martinez OT  11/15/2022    Electronically signed by Surekha Martinez OT at 11/15/22 1539         Discharge Order (From admission, onward)     Start     Ordered    11/17/22 1301  Discharge patient  Once        Expected Discharge Date: 11/17/22    [place] [facility] [place]    Physician of Record for Attribution - Please select from Treatment Team: MARK ANTHONY DAVIS [[id number]]    Review needed by CMO to determine Physician of Record: No       Question Answer Comment   Physician of Record for Attribution - Please select from Treatment Team MARK ANTHONY DAVIS    Review needed by CMO to determine Physician of Record No        11/17/22 8328

## 2022-11-17 NOTE — PROGRESS NOTES
Patient continues on day 8 vancomycin. A 22.5 hour post infusion vancomycin level was reported as 19.5 mg/L today. Based on this level, the AUC is calculated as 557. Will continue vancomycin dose of 1250 mg daily to target an AUC of 400-600. Will continue to follow and recheck a level when appropriate.    Thank you,    Sydnee Mariee, PharmD  11/17/2022  07:19 EST

## 2022-11-17 NOTE — PLAN OF CARE
Goal Outcome Evaluation:              Outcome Evaluation: VSS.  Pt A&Ox4.  Pt remains on RA.  Pt continues getting pain medication as ordered.  Pt continues to have pain to rt leg.  Pt has intermittent nonproductive cough and request cough medication and tessalon pearls.  Pt in specialty bed with difficulty turning but encouraged to turn q2 hours.  Pt was going home today but plans fell through, will try again tomorrow.  Bed in low position.  Call lights in reach.  Bed alarm on.  Will continue to monitor 7a-7p.

## 2022-11-17 NOTE — PLAN OF CARE
Goal Outcome Evaluation:  Plan of Care Reviewed With: patient        Progress: no change  Outcome Evaluation: Patient A&Ox4. Patient has been hypotensive at times this shift, but VSS at this time. Patient remains on room air and is tolerating well. Patient continues to verbalize pain to RLE, PRN medication administered as well as scheduled pain medication. She continues to have intermittent nonproductive cough, PRN medication given. Patient denies chest pain thus far in shift, but verbalizes intermittent L shoulder pain, position adjusted and pillow support provided. Patient encouraged to change positions q2h, but has been refusing to turn intermittently. Patient appears to be resting in bed at this time and denies any further requests. Call light and personal items within reach. Will continue to follow plan of care 7p to 7a.

## 2022-11-17 NOTE — DISCHARGE PLACEMENT REQUEST
"Malina Velasquez (57 y.o. Female)     Date of Birth   1965    Social Security Number       Address   [address] [facility] [address]    Home Phone   [phone]    MRN   [id number]       [place]   None    Marital Status                               Admission Date   11/10/22    Admission Type   Emergency    Admitting Provider   Miguel Licea MD    Attending Provider   Michael Roman DO    Department, Room/Bed   [facility], P207/1P       Discharge Date       Discharge Disposition   [facility] [place]    Discharge Destination                               Attending Provider: Michael Roman DO    Allergies: Sulfa Antibiotics, Nsaids, Penicillins    Isolation: None   Infection: None   Code Status: CPR    Ht: 177.8 cm (70\")   Wt: 288 kg (635 lb)    Admission Cmt: None   Principal Problem: Acute on chronic renal failure (HCC) [N17.9,N18.9]                 Active Insurance as of 11/10/2022     Primary Coverage     Payor Plan Insurance Group Employer/Plan Group    [place] [id number]     Payor Plan Address Payor Plan Phone Number Payor Plan Fax Number Effective Dates    [employer] [address] [address] [phone]  1/1/2021 - None Entered    [facility] [id number]       Subscriber Name Subscriber Birth Date Member ID       MALINA VELASQUEZ 1965 [id number]                 Emergency Contacts      (Rel.) Home Phone Work Phone Mobile Phone    Ant Velasquez (Spouse) [phone] -- --    GLADIS HODGE (Daughter) [phone] -- --               History & Physical      Maria Isabel Draper PA-C at 11/10/22 2009     Attestation signed by Miguel Licea MD at 11/10/22 5724 (Updated)    I have seen and examined the patient independently from Maria Isabel Draper PA-C, and have reviewed this documentation and agree. Continue broad spectrum IV antibiotics to cover what appears to be right foot cellulitis superimposed on severe " chronic lymphedema, along with concern for untreated pyogenic arthritis of right knee that was diagnosed per joint aspiration in 2022 as documented in Maria Isabel's note. Await further recommendations from ID and orthopedic surgery. Appreciate nephrology's recommendations regarding IV fluids. Will monitor closely for signs/symptoms of developing fluid overload in light of patient's history of CHF. Extent of CHF unknown; will follow up on ECHO ordered for tomorrow morning. Regarding patient's report of exposure to grandchildren with flu, she clarified to me that her grandchildren had cold and flu-like symptoms but tested negative for flu A and B. Respiratory PCR panel has been ordered to rule out other infectious causes, result is still pending.                       [facility]  History & Physical    Patient Identification:  Name:  Malina Velasquez  Age:  57 y.o.  Sex:  female  :  1965  MRN:  [id number]   Visit Number:  [id number]  Primary Care Physician:  Jeremiah Topete MD    Subjective     11/10/2022   Chief complaint:   Chief Complaint   Patient presents with   • Weakness - Generalized     Pt states dr sent her here for high wbc and kidney function      History of presenting illness:      Malina Velasquez is a 57 y.o. female with past medical history significant for CHF, essential hypertension, history of MVP, COPD, chronic hepatitis C, and former tobacco abuse who presents to [facility] emergency department with complaints of generalized weakness.     Patient states that she seen her PCP on Monday for annual blood work and was called back this morning due to her labs being abnormal. Specifically she was told that her creatinine was 4 and her WBC was 23K. She states that she lives with her grandchildren who was recently diagnosed with the Flu. She reports that approximately 10 days ago she developed symptoms similar to what they were experiencing. She  "states that she developed diarrhea and used \"3 boxes of Imodium over 1 weekend.\" She states that the diarrhea is now resolved and she had a normal bowel movement on Monday. She reports that she also had some nausea and vomiting which she attributes to not having good oral intake and continuing to take her home medications on an empty stomach. However, Tuesday night she developed a cough that is productive with green-tinged sputum. She also complains of congestion, nasal drainage, and sore throat. She did take 4 doses of left-over doxycycline and a dose of Suvextro to \"clear up her infection.\" She does report chills, but denies any fevers. She denies any abdominal pain. She denies any chest pain. She does report sediment in her urine, but denies any urinary symptoms.    Patient also explains that she has had worsening swelling of her right lower extremity over the past month. She can hardly tolerate the right knee or her right lower extremity to be touched or moved during exam. She states 3 years ago she had spontaneous MRSA infection of her right knee in which she had an arthroscopic knee joint washout performed in [place] and was treated with an extended course of antibiotics. Per chart review, she was seen by [facility] 07/2022 who performed joint aspiration and was diagnosed with pyogenic arthritis of right knee due to unspecified organism. Patient states that she does not remember completing a course of antibiotics after the aspiration of the joint was performed.     Upon arrival to the ED, vital signs were temperature 98.7, heart rate 95, respirations 16, /76, SpO2 95% on room air. CMP with glucose 114, sodium 133, chloride 94, creatinine 4.41, BUN 67, eGFR 11.1, alkaline phosphatase 134, albumin 2.68, otherwise within normal limits. CBC with WBC 13.35, RBC 3.01, hemoglobin 9.5, hematocrit 29.4, MCV 97.7, platelets 56, otherwise unremarkable. C-RP 20.13. Lactate 5.0. Procalcitonin " 1.13. Hemoglobin A1c 4.7. TSH 0.960. Pending peripheral blood cultures x 2. Chest XR with cardiomegaly and pulmonary vascular congestion and right basilar airspace disease. CT abdomen/pelvis with no acute intra-abdominal abnormality, mildly nodular contour of the liver which may reflect early cirrhosis, and borderline splenomegaly. CT chest with no acute cardiopulmonary disease, small calcified pulmonary nodule of left upper lobe likely pulmonary granuloma, bibasilar subsegmental atelectasis, small hiatal hernia.     Known Emergency Department medications received prior to my evaluation included IV Vancomycin, IV Merrem, IV Dilaudid 1 mg, IV Zofran 4 mg, sepsis bolus (2055 mL). Emergency Department Room location at the time of my evaluation was 406.     Patient will be admitted to the PCU for further evaluation and monitoring.     ---------------------------------------------------------------------------------------------------------------------   Review of Systems   Constitutional: Positive for appetite change (decreased) and chills. Negative for activity change and fever.   HENT: Positive for congestion, rhinorrhea and sore throat.    Eyes: Negative for discharge and redness.   Respiratory: Positive for cough (productive with green tinged sputum). Negative for apnea, shortness of breath and wheezing.    Cardiovascular: Positive for leg swelling (right lower extremity). Negative for chest pain and palpitations.   Gastrointestinal: Positive for diarrhea (reports having normal bowel movement on Monday), nausea and vomiting. Negative for abdominal distention and abdominal pain.   Genitourinary: Negative for difficulty urinating, dysuria, frequency and urgency.        Reports sediment in urine   Musculoskeletal: Positive for arthralgias (right knee) and joint swelling (right knee).   Skin: Negative for color change and wound.   Neurological: Positive for weakness (generalized). Negative for dizziness and  light-headedness.   Psychiatric/Behavioral: Negative for agitation, behavioral problems and confusion.        ---------------------------------------------------------------------------------------------------------------------   Past Medical History:   Diagnosis Date   • CHF (congestive heart failure) ([facility])    • COPD (chronic obstructive pulmonary disease) ([facility])    • Edema    • Hep C w/o coma, chronic (HCC)    • Hypertension    • Mitral valve prolapse      Past Surgical History:   Procedure Laterality Date   • GASTRIC BANDING     • JOINT ASPIRATION AND/OR INJECTION Right 07/2022     Family History   Problem Relation Age of Onset   • Hypertension Mother    • Breast cancer Mother    • COPD Father      Social History     Socioeconomic History   • Marital status:    Tobacco Use   • Smoking status: Never   Vaping Use   • Vaping Use: Never used   Substance and Sexual Activity   • Alcohol use: Not Currently   • Drug use: Never   • Sexual activity: Defer     ---------------------------------------------------------------------------------------------------------------------   Allergies:  Sulfa antibiotics and Penicillins  ---------------------------------------------------------------------------------------------------------------------   Home medications:    Medications below are reported home medications pulling from within the system; at this time, these medications have not been reconciled unless otherwise specified and are in the verification process for further verifcation as current home medications.  Medications Prior to Admission   Medication Sig Dispense Refill Last Dose   • bumetanide (BUMEX) 2 MG tablet Take 1 tablet by mouth Daily.      • hydrALAZINE (APRESOLINE) 10 MG tablet Take 1 tablet by mouth 3 (Three) Times a Day.      • levothyroxine (SYNTHROID, LEVOTHROID) 25 MCG tablet Take 1 tablet by mouth Daily.      • metoprolol succinate XL (TOPROL-XL) 25 MG 24 hr tablet Take 1 tablet by mouth Daily.       • oxyCODONE-acetaminophen (PERCOCET)  MG per tablet Take 1 tablet by mouth Every 6 (Six) Hours As Needed for Moderate Pain.      • traZODone (DESYREL) 50 MG tablet Take 1 tablet by mouth Every Night.          Hospital Scheduled Meds:  heparin (porcine), 5,000 Units, Subcutaneous, Q12H  [START ON 11/11/2022] meropenem, 1 g, Intravenous, Q12H  sodium chloride, 10 mL, Intravenous, Q12H  [START ON 11/11/2022] vancomycin, 500 mg, Intravenous, Q24H      sodium chloride, 125 mL/hr, Last Rate: 125 mL/hr (11/10/22 2008)        Current listed hospital scheduled medications may not yet reflect those currently placed in orders that are signed and held awaiting patient's arrival to floor.   ---------------------------------------------------------------------------------------------------------------------     Objective     Vital Signs:  Temp:  [98.7 °F (37.1 °C)] 98.7 °F (37.1 °C)  Heart Rate:  [95] 95  Resp:  [16] 16  BP: (120-131)/(76-89) 120/89      11/10/22  1340   Weight: (!) 181 kg (399 lb)     Body mass index is 57.25 kg/m².  ---------------------------------------------------------------------------------------------------------------------       Physical Exam  Constitutional:       General: She is awake.      Appearance: She is obese. She is ill-appearing (disheveled).      Comments: Patient resting in bed. Appears in no acute distress.    HENT:      Head: Normocephalic and atraumatic.      Right Ear: External ear normal.      Left Ear: External ear normal.      Nose: Nose normal.      Mouth/Throat:      Mouth: Mucous membranes are moist.      Pharynx: Oropharynx is clear.   Eyes:      Extraocular Movements: Extraocular movements intact.      Conjunctiva/sclera: Conjunctivae normal.      Pupils: Pupils are equal, round, and reactive to light.   Cardiovascular:      Rate and Rhythm: Normal rate and regular rhythm.      Pulses: Normal pulses.           Dorsalis pedis pulses are 2+ on the right side and 2+ on the  left side.        Posterior tibial pulses are 2+ on the right side and 2+ on the left side.      Heart sounds: Murmur (Left upper sternal border) heard.   Pulmonary:      Effort: Pulmonary effort is normal. No accessory muscle usage or respiratory distress.      Breath sounds: Normal breath sounds. No decreased breath sounds, wheezing, rhonchi or rales.      Comments: Difficult exam due to body habitus. No obvious wheezes, crackles, or rhonchi on auscultation.  Abdominal:      General: Abdomen is flat. Bowel sounds are normal. There is no distension.      Palpations: Abdomen is soft.      Tenderness: There is no abdominal tenderness. There is no guarding.   Musculoskeletal:         General: Swelling (right lower extremity) and tenderness (right lower extremity) present.      Cervical back: Normal range of motion and neck supple.      Right lower leg: Edema present.      Left lower leg: No edema.      Comments: ROM of right lower limited due to pain.    Skin:     General: Skin is warm and dry.      Findings: Erythema present.      Comments: Right foot with slight erythema on dorsal surface. Increased edema and warmth noted of RLE and right knee compared to LLE. No obvious open wounds or drainage. Difficult to assess posterior aspect of leg due to patient complaining of pain.     Not able to assess back or gluteal area due to patient refusing to turn. Will attempt to assess when patient arrives to floor and able to get assistance to turn patient.    Neurological:      General: No focal deficit present.      Mental Status: She is alert and oriented to person, place, and time. Mental status is at baseline.   Psychiatric:         Mood and Affect: Mood normal.         Behavior: Behavior normal. Behavior is cooperative.         Thought Content: Thought content normal.         ---------------------------------------------------------------------------------------------------------------------  EKG:    Pending cardiology  read. Per my review, EKG reveals NSR with HR 89 and QTc 476 ms without acute ischemic changes.         I have personally looked at both the EKG and the telemetry strips.  ---------------------------------------------------------------------------------------------------------------------   Results from last 7 days   Lab Units 11/10/22  1800 11/10/22  1546 11/10/22  1433   CRP mg/dL  --   --  20.13*   LACTATE mmol/L 4.2*  --  5.0*   WBC 10*3/mm3  --   --  13.35*   HEMOGLOBIN g/dL  --   --  9.5*   HEMATOCRIT %  --   --  29.4*   MCV fL  --   --  97.7*   MCHC g/dL  --   --  32.3   PLATELETS 10*3/mm3  --   --  56*   INR   --  1.12*  --          Results from last 7 days   Lab Units 11/10/22  1433   SODIUM mmol/L 133*   POTASSIUM mmol/L 5.2   CHLORIDE mmol/L 94*   CO2 mmol/L 25.9   BUN mg/dL 67*   CREATININE mg/dL 4.41*   CALCIUM mg/dL 9.1   GLUCOSE mg/dL 114*   ALBUMIN g/dL 2.68*   BILIRUBIN mg/dL 0.8   ALK PHOS U/L 134*   AST (SGOT) U/L 22   ALT (SGPT) U/L 9   Estimated Creatinine Clearance: 25.3 mL/min (A) (by C-G formula based on SCr of 4.41 mg/dL (H)).  No results found for: AMMONIA  Results from last 7 days   Lab Units 11/10/22  1433   TROPONIN T ng/mL 0.016         Lab Results   Component Value Date    HGBA1C 4.70 (L) 11/10/2022     Lab Results   Component Value Date    TSH 0.960 11/10/2022     No results found for: PREGTESTUR, PREGSERUM, HCG, HCGQUANT  Pain Management Panel    There is no flowsheet data to display.           ---------------------------------------------------------------------------------------------------------------------  Imaging Results (Last 7 Days)     Procedure Component Value Units Date/Time    CT Lower Extremity Right Without Contrast [[id number]] Resulted: 11/10/22 2229     Updated: 11/10/22 2246    CT Chest Without Contrast Diagnostic [[id number]] Collected: 11/10/22 1720     Updated: 11/10/22 1723    Narrative:      CT Chest WO    CLINICAL HISTORY: soa.    COMPARISON: None.    TECHNIQUE:  CT chest without contrast. Coronal and sagittal reconstructions were obtained.  Radiation dose reduction techniques included automated exposure control or exposure modulation based on body size. Count of known CT and cardiac nuc med studies  performed in previous 12 months: 0.     FINDINGS:    Lungs: Bibasilar subsegmental atelectasis. No focal consolidation or mass. Small calcified pulmonary nodule in left upper lobe, likely pulmonary granuloma.    Pleura: No pleural effusions.    Cardiovascular: Normal heart size. No pericardial effusion. Normal course and caliber of the thoracic aorta.    Mediastinum: Evaluation is limited by lack of intravenous contrast. Small calcified hilar mediastinal lymph nodes.    Osseous: No acute osseous abnormality. Degenerative changes of the thoracic spine and dextroconvex curvature of the thoracic spine.    Other: None.    Upper abdomen: Small hiatal hernia.       Impression:        1.  No acute cardiopulmonary disease.   2.  Bibasilar subsegmental atelectasis.  3.  Small hiatal hernia.    Signer Name: Mark Bird MD   Signed: 11/10/2022 5:20 PM   Workstation Name: [id number]    [facility]    CT Abdomen Pelvis Without Contrast [[id number]] Collected: 11/10/22 1717     Updated: 11/10/22 1719    Narrative:      CT Abdomen Pelvis WO    INDICATION:   abd pain    TECHNIQUE:   CT of the abdomen and pelvis without IV contrast. Coronal and sagittal reconstructions were obtained.  Radiation dose reduction techniques included automated exposure control or exposure modulation based on body size. Count of known CT and cardiac nuc  med studies performed in previous 12 months: 0.     COMPARISON:   None available.    FINDINGS:  Evaluation of abdominal viscera is limited due to lack of intravenous contrast. Study is also degraded by patient body habitus.    Lower chest: Unremarkable.  Liver: Mildly nodular in contour. No solid mass.  Gallbladder and bile ducts: Surgically  absent gallbladder. No biliary dilatation.  Spleen: Borderline enlarged with scattered punctate calcifications, likely sequelae of old granulomatous disease.  Pancreas: Diffusely atrophic.  Adrenals: Unremarkable.  Kidneys and ureters: No renal or ureteral stones. No hydronephrosis.  Bowel: Postsurgical changes of the stomach. Nondilated bowel with no wall thickening.Appendix not visualized, but no inflammatory changes present in the right lower quadrant.  Bladder: Underdistended.  Reproductive organs: Normal uterus. No adnexal masses.  Lymph nodes: Unremarkable.  Peritoneum: Unremarkable.  Vessels: Prominent portosystemic collateral vessels.  Abdominal wall: Unremarkable.  Bones: Multilevel degenerative changes of the lumbar spine. Levoconvex curvature of the lumbar spine..      Impression:        1.  Exam is limited by lack of intravenous contrast and patient body habitus.  2.  No acute intra-abdominal abnormality.  3.  Mildly nodular contour of the liver which may reflect early cirrhosis. Recommend correlation with liver function tests.  4.  Borderline splenomegaly.    Signer Name: Mark Bird MD   Signed: 11/10/2022 5:17 PM   Workstation Name: [id number]    [facility]    XR Chest 1 View [[id number]] Collected: 11/10/22 1615     Updated: 11/10/22 1617    Narrative:      EXAM:    XR Chest, 1 View     EXAM DATE:    11/10/2022 3:13 PM     CLINICAL HISTORY:    cough     TECHNIQUE:    Frontal view of the chest.     COMPARISON:    No relevant prior studies available.     FINDINGS:    Lungs:  Pulmonary vascular congestion.  Right basilar airspace  disease.    Pleural space:  Unremarkable.  No pneumothorax.    Heart:  Marked cardiomegaly.    Mediastinum:  Unremarkable.    Bones/joints:  Unremarkable.       Impression:      1.  Cardiomegaly and pulmonary vascular congestion.  2.  Right basilar airspace disease.     This report was finalized on 11/10/2022 4:15 PM by Dr. Carlos Manuel King MD.           I  have personally reviewed the above radiology images and read the final radiology report on 11/10/22  ---------------------------------------------------------------------------------------------------------------------  Assessment / Plan     Active Hospital Problems    Diagnosis  POA   • **Acute on chronic renal failure (HCC) [N17.9, N18.9]  Yes       ASSESSMENT/PLAN:  -Severe sepsis criteria 2/2 undetermined etiology at present, POA  -Cellulitis of right foot and possible recurrent versus persistent septic arthritis of right knee joint , POA  -History of MRSA infection of right knee joint s/p washout  -History of pyogenic arthritis of right knee joint 07/2022  -Reported recent nausea, vomiting, and diarrhea  -Meets severe sepsis criteria with HR >90, C-RP 20.13, WBC 13.35, lactate 5.0, and procalcitonin 1.13.   -Urinalysis with no evidence of acute UTI.   -No acute intra-abdominal findings on CT abdomen/pelvis.  -CT chest with bibasilar atelectasis, but no evidence of opacities or consolidations to suggest pneumonia.  -Reports history of spontaneous MRSA infection of right knee joint requiring arthroscopic washout at [place] and treated with extended course of antibiotics. Seen by [facility] 07/2022 to have joint aspiration performed and diagnosed with pyogenic arthritis of right knee; patient denies completing course of antibiotics.   -Received IV Vancomycin and IV Merrem in ED; will continue on admission while awaiting culture results.   -Received sepsis bolus (2055 mL) in ED; continuous IV fluids ordered.   -Infectious disease and orthopedic surgery consults ordered, input and assistance much appreciated.   -Will monitor off imodium and if diarrhea reoccurs may consider further work-up for C. Diff.  -Obtain venous doppler of RLE to r/o DVT.    -Obtain respiratory panel PCR.  -Repeat a.m. CBC and C-RP.Trend lactate until normalized. Follow-up on blood cultures.     -Acute on chronic kidney  disease, POA  -Creatinine upon arrival 4.41; per verbal report from ED, creatinine preivously 1.34.   -ED provider discussed with on-call nephrology recommending NS @ 125 mL/hr after sepsis bolus.  -Formal inpatient nephrology consult ordered, input and assistance much appreciated.   -No evidence of obstructive uropathy on CT abdomen/pelvis.   -Avoid nephrotoxins as able; will hold home Bumex.   -Repeat a.m. CMP.     -Documented history of CHF  -No previous echo on file; obtain a.m. TTE.     -Essential hypertensin  -BP currently stable.   -Monitor per hospital protocol.   -Review home medications once reconciled.     -Macrocytic anemia  -Thrombocytopenia  -Possible 2/2 SUZETTE versus liver cirrhosis.   -Baseline unknown.   -Obtain iron panel, vitamin B12, and folate levels.   -Obtain peripheral blood smear.   -Repeat a.m. CBC.     -Liver cirrhosis   -Chronic hepatitis C  -Hypoalbuminemia  -Liver enzymes normal.   -May benefit from referral to hepatology/GI at discharge.  -Repeat a.m. CMP.     -Hypothyoridism  -TSH normal.   -Resume home Synthroid.     -Morbid obesity, BMI 57.25 kg/m2  -S/P gastric sleeve 2019  -Complicates all aspects of care  -------------------------------  F/E/N: Continuous NS @ 125 mL/hr. Replace electrolytes as needed. Regular diet.   DVT prophylaxis: SQ Heparin   Activity: Up with assistance; fall precautions  -------------------------------  CODE STATUS: Full    High risk secondary to sepsis of undetermined etiology at present, cellulitis of RLE, possible recurrent vs persistent septic arthritis of right knee joint, acute on CKD  -------------------------------  Expected length of stay:  INPATIENT status due to the need for care which can only be reasonably provided in an hospital setting such as aggressive/expedited ancillary services and/or consultation services, the necessity for IV medications, close physician monitoring and/or the possible need for procedures.  In such, I feel patient's risk  for adverse outcomes and need for care warrant INPATIENT evaluation and predict the patient's care encounter to likely last beyond 2 midnights.     Disposition: Pending clinical course    Maria Isabel Draper PA-C  11/10/22  22:50 EST    ---------------------------------------------------------------------------------------------------------------------           Electronically signed by Miguel Licea MD at 11/10/22 7573         Current Facility-Administered Medications   Medication Dose Route Frequency Provider Last Rate Last Admin   • benzonatate (TESSALON) capsule 100 mg  100 mg Oral Q4H PRN Julius Jones MD   100 mg at 11/17/22 1150   • bumetanide (BUMEX) tablet 2 mg  2 mg Oral Daily Michael Roman DO       • cetirizine (zyrTEC) tablet 10 mg  10 mg Oral BID PRN Ann Marie Ochoa DO       • diphenhydrAMINE (BENADRYL) capsule 25 mg  25 mg Oral Nightly PRN Ann Marie Ochoa DO   25 mg at 11/14/22 2344   • docusate sodium (COLACE) capsule 100 mg  100 mg Oral BID PRN Ann Marie Ochoa DO       • fluticasone (FLONASE) 50 MCG/ACT nasal spray 2 spray  2 spray Nasal Daily Ann Marie Ochoa DO   2 spray at 11/17/22 0823   • folic acid (FOLVITE) tablet 500 mcg  500 mcg Oral Daily Farhana Hampton MD   500 mcg at 11/17/22 0818   • guaiFENesin-dextromethorphan (ROBITUSSIN DM) 100-10 MG/5ML syrup 5 mL  5 mL Oral Q4H PRN Michael Roman DO   5 mL at 11/17/22 0633   • heparin (porcine) 5000 UNIT/ML injection 5,000 Units  5,000 Units Subcutaneous Q12H Miguel Licea MD   5,000 Units at 11/17/22 0817   • hydrOXYzine (ATARAX) tablet 50 mg  50 mg Oral TID PRN Ann Marie Ochoa DO       • levothyroxine (SYNTHROID, LEVOTHROID) tablet 100 mcg  100 mcg Oral Daily Ann Marie Ochoa DO   100 mcg at 11/17/22 0819   • loperamide (IMODIUM) capsule 2 mg  2 mg Oral Once Michael Roman DO       • metoprolol tartrate (LOPRESSOR) tablet 12.5 mg  12.5 mg Oral Q12H Ann Marie Ochoa DO   12.5 mg at 11/17/22 0818   • morphine injection 2  mg  2 mg Intravenous Q4H PRN Miguel Licea MD   2 mg at 11/17/22 1010   • nitroglycerin (NITROSTAT) SL tablet 0.4 mg  0.4 mg Sublingual Q5 Min PRN Miguel Licea MD   0.4 mg at 11/16/22 0907   • nystatin (MYCOSTATIN) 156880 UNIT/GM cream 1 application  1 application Topical BID Ann Marie Ochoa DO   1 application at 11/17/22 0825   • nystatin (MYCOSTATIN) powder 1 application  1 application Topical BID PRN Ann Marie Ochoa DO       • oxyCODONE (ROXICODONE) immediate release tablet 10 mg  10 mg Oral TID PRN Michael Roman DO       • oxyCODONE (ROXICODONE) immediate release tablet 15 mg  15 mg Oral 4x Daily Ann Marie Ochoa DO   15 mg at 11/17/22 1152   • pantoprazole (PROTONIX) EC tablet 40 mg  40 mg Oral QAM Ann Marie Ochoa DO   40 mg at 11/17/22 0633   • phenol (CHLORASEPTIC) 1.4 % liquid 1 spray  1 spray Mouth/Throat Q2H PRN Michael Roman DO   1 spray at 11/15/22 1851   • prochlorperazine (COMPAZINE) injection 10 mg  10 mg Intravenous Q6H PRN Julius Jones MD   10 mg at 11/16/22 0229   • sodium chloride 0.9 % flush 10 mL  10 mL Intravenous PRN Miguel Licea MD       • sodium chloride 0.9 % flush 10 mL  10 mL Intravenous Q12H Miguel Licea MD   10 mL at 11/17/22 0825   • sodium chloride 0.9 % flush 10 mL  10 mL Intravenous PRN Miguel Licea MD       • sodium chloride 0.9 % flush 10 mL  10 mL Intravenous Q12H Ann Marie Ochoa DO   10 mL at 11/17/22 0825   • sodium chloride 0.9 % flush 10 mL  10 mL Intravenous PRN Ann Marie Ochoa DO       • sodium chloride 0.9 % flush 10 mL  10 mL Intravenous Q12H Michael Roman DO   10 mL at 11/17/22 0825   • sodium chloride 0.9 % flush 10 mL  10 mL Intravenous Q12H Michael Roman DO   10 mL at 11/17/22 0824   • sodium chloride 0.9 % flush 10 mL  10 mL Intravenous PRN Michael Roman DO       • sodium chloride 0.9 % flush 20 mL  20 mL Intravenous PRN Michael Roman DO       • traZODone (DESYREL) tablet 50 mg  50 mg Oral Nightly PRN  Ann Marie Ochoa DO       • vancomycin 1250 mg/250 mL 0.9% NS IVPB ([facility])  1,250 mg Intravenous Q24H Kane Winter MD   1,250 mg at 11/16/22 2145   • zinc oxide 20 % ointment   Topical BID Ann Marie Ochoa DO   Given at 11/17/22 0825     Lab Results (last 48 hours)     Procedure Component Value Units Date/Time    Basic Metabolic Panel [[id number]]  (Abnormal) Collected: 11/17/22 0918    Specimen: Blood Updated: 11/17/22 1003     Glucose 120 mg/dL      BUN 35 mg/dL      Creatinine 1.87 mg/dL      Sodium 132 mmol/L      Potassium 4.1 mmol/L      Chloride 97 mmol/L      CO2 27.6 mmol/L      Calcium 7.9 mg/dL      BUN/Creatinine Ratio 18.7     Anion Gap 7.4 mmol/L      eGFR 31.1 mL/min/1.73      Comment: National Kidney Foundation and American Society of Nephrology (ASN) Task Force recommended calculation based on the Chronic Kidney Disease Epidemiology Collaboration (CKD-EPI) equation refit without adjustment for race.       Narrative:      GFR Normal >60  Chronic Kidney Disease <60  Kidney Failure <15      CBC (No Diff) [[id number]]  (Abnormal) Collected: 11/17/22 0918    Specimen: Blood Updated: 11/17/22 0945     WBC 14.19 10*3/mm3      RBC 2.52 10*6/mm3      Hemoglobin 8.2 g/dL      Hematocrit 24.3 %      MCV 96.4 fL      MCH 32.5 pg      MCHC 33.7 g/dL      RDW 15.2 %      RDW-SD 51.1 fl      MPV 9.2 fL      Platelets 56 10*3/mm3     Magnesium [[id number]]  (Normal) Collected: 11/17/22 0042    Specimen: Blood Updated: 11/17/22 0231     Magnesium 1.9 mg/dL     Vancomycin, Random [[id number]]  (Normal) Collected: 11/16/22 1730    Specimen: Blood Updated: 11/16/22 1841     Vancomycin Random 19.50 mcg/mL     Narrative:      Therapeutic Ranges for Vancomycin    Vancomycin Random   5.0-40.0 mcg/mL  Vancomycin Trough   5.0-20.0 mcg/mL  Vancomycin Peak     20.0-40.0 mcg/mL    Soluble Transferrin Receptor [[id number]]  (Abnormal) Collected: 11/14/22 1435    Specimen: Blood Updated: 11/16/22 1710     Transferrin Receptor 10.3  nmol/L     Narrative:      Performed at:  [address]  [id number]  : Blake Broussard MD, Phone:  [phone]    Troponin [[id number]]  (Normal) Collected: 11/16/22 0912    Specimen: Blood Updated: 11/16/22 0936     Troponin T 0.014 ng/mL     Narrative:      Troponin T Reference Range:  <= 0.03 ng/mL-   Negative for AMI  >0.03 ng/mL-     Abnormal for myocardial necrosis.  Clinicians would have to utilize clinical acumen, EKG, Troponin and serial changes to determine if it is an Acute Myocardial Infarction or myocardial injury due to an underlying chronic condition.       Results may be falsely decreased if patient taking Biotin.      ROIETJ25 Activity [[id number]]  (Abnormal) Collected: 11/14/22 1538    Specimen: Blood Updated: 11/16/22 0816     JHYSWB77 Activity 26.9 %     Narrative:      Test(s) 335475-FMKXJG36 Activity  was developed and its performance characteristics determined  by [employer]. It has not been cleared or approved by the Food  and Drug Administration.  Performed at:  [address] [place] [address]  [id number]  : Blake Broussard MD, Phone:  [phone]  Specimen Comment: Called to JOSSY DILLON on 11/16/2022 at 08:04 ET for test(s)  Specimen Comment: [id number] Activity    Troponin [[id number]]  (Normal) Collected: 11/16/22 0506    Specimen: Blood Updated: 11/16/22 0546     Troponin T <0.010 ng/mL     Narrative:      Troponin T Reference Range:  <= 0.03 ng/mL-   Negative for AMI  >0.03 ng/mL-     Abnormal for myocardial necrosis.  Clinicians would have to utilize clinical acumen, EKG, Troponin and serial changes to determine if it is an Acute Myocardial Infarction or myocardial injury due to an underlying chronic condition.       Results may be falsely decreased if patient taking Biotin.      CBC (No Diff) [[id number]]  (Abnormal) Collected: 11/16/22 0012    Specimen: Blood Updated: 11/16/22 0129     WBC 12.16 10*3/mm3       RBC 2.61 10*6/mm3      Hemoglobin 8.4 g/dL      Hematocrit 25.1 %      MCV 96.2 fL      MCH 32.2 pg      MCHC 33.5 g/dL      RDW 14.8 %      RDW-SD 51.1 fl      MPV 11.0 fL      Platelets 42 10*3/mm3     Basic Metabolic Panel [[id number]]  (Abnormal) Collected: 11/16/22 0012    Specimen: Blood Updated: 11/16/22 0104     Glucose 143 mg/dL      BUN 40 mg/dL      Creatinine 1.99 mg/dL      Sodium 131 mmol/L      Potassium 3.8 mmol/L      Chloride 96 mmol/L      CO2 25.4 mmol/L      Calcium 7.8 mg/dL      BUN/Creatinine Ratio 20.1     Anion Gap 9.6 mmol/L      eGFR 28.8 mL/min/1.73      Comment: National Kidney Foundation and American Society of Nephrology (ASN) Task Force recommended calculation based on the Chronic Kidney Disease Epidemiology Collaboration (CKD-EPI) equation refit without adjustment for race.       Narrative:      GFR Normal >60  Chronic Kidney Disease <60  Kidney Failure <15      Erythropoietin [[id number]] Collected: 11/14/22 1538    Specimen: Blood Updated: 11/15/22 2207     Erythropoietin 17.0 mIU/mL      Comment: [employer] DxI 800 Immunoassay System  Values obtained with different assay methods or kits cannot be used  interchangeably. Results cannot be interpreted as absolute evidence  of the presence or absence of malignant disease.       Narrative:      Performed at:  [address] [place] [address]  [id number]  : Valerio Paredes PhD, Phone:  [phone]    Blood Culture - Blood, Arm, Right [[id number]]  (Normal) Collected: 11/10/22 1415    Specimen: Blood from Arm, Right Updated: 11/15/22 1446     Blood Culture No growth at 5 days    Blood Culture - Blood, Arm, Left [[id number]]  (Normal) Collected: 11/10/22 1433    Specimen: Blood from Arm, Left Updated: 11/15/22 1446     Blood Culture No growth at 5 days          vancomycin 1250 mg/250 mL 0.9% NS IVPB ([facility]) [[id number]] (Order [id number])  Order  Date: 11/17/2022 Department: [facility]  PROGRESS CARE Ordering/Authorizing: Michael Roman DO     Medication  vancomycin 1250 mg/250 mL 0.9% NS IVPB ([facility]) [[id number]]  Order History  Outpatient  Date/Time Action Taken User Additional Information   11/17/22 1318 Sign Michael Roman DO Reorder from Order:[id number]   11/17/22 1318 Taking Flag Checked Michael Roman DO [id number]     Outpatient Morphine Milligram Equivalents Per Day  Expand All  Collapse All   Contains abnormal data 11/17/22 and after  150 MME/Day                                  Calculation Information                         vancomycin 1250 mg/250 mL 0.9% NS IVPB ([facility]) [[id number]]     Order Details  Dose: 1,250 mg Route: Intravenous Frequency: Every 24 Hours   Dispense Quantity: 7,500 mL Refills: 1    Indications of Use: Sepsis         Sig: Infuse 250 mL into a venous catheter Daily for 50 doses. Infusion pharmacy to manage and adjust dose as necessary for weekly bmp/renal function  Indications: Infection with Dysregulated Inflammatory Response         Start Date: 11/17/22 End Date: 01/06/23 after 50 doses   Written Date: 11/17/22 Expiration Date: 11/17/23   Providers    Ordering Provider and Authorizing Provider:    Michael Roman DO   [address] [address]   Phone:  [phone]   Fax:  [phone]   NPI:  [id number]        Ordering User:  Michael Roman DO          Orders with any of the following pharmaceutical classes: Misc. Antiinfectives    Name Dose Frequency Start Date End Date Medication Warnings Interventions? Order Mode    vancomycin 1250 mg/250 mL 0.9% NS IVPB ([facility]) 1,250 mg Every 24 Hours 11/14/22 1800 01/06/23 1759 Dose Yes Inpatient    vancomycin (VANCOCIN) 1,000 mg in sodium chloride 0.9 % 250 mL IVPB 1,000 mg Every 24 Hours 11/11/22 1800 11/14/22 1211  Yes Inpatient    vancomycin (VANCOCIN) 500 mg in sodium chloride 0.9 % 100 mL IVPB 500 mg Every 24 Hours 11/11/22 1700 11/11/22 1628  Yes Inpatient    meropenem (MERREM) 1 g in sodium chloride 0.9 % 100  mL IVPB-VTB 1 g Every 12 Hours 11/11/22 0800 11/11/22 0909  Yes Inpatient    meropenem (MERREM) 1 g in sodium chloride 0.9 % 100 mL IVPB-VTB 1 g Once 11/10/22 2000 11/10/22 2038   Inpatient      Warnings Override History    Total number of overridden warnings: 1   Full Warnings History     Pharmacist Clinical Review History    This prescription has not been clinically reviewed.     Order Reconciliation Actions       Order Reconciliation Actions     E-Prescribing Status    Outpatient Medication Detail    vancomycin 1250 mg/250 mL 0.9% NS IVPB ([facility])        Sig: Infuse 250 mL into a venous catheter Daily for 50 doses. Infusion pharmacy to manage and adjust dose as necessary for weekly bmp/renal function  Indications: Infection with Dysregulated Inflammatory Response        Class: No Print        Route: Intravenous          Event History       Event History     Tracking Reports  Cosign Tracking Order Transmittal Tracking

## 2022-11-18 ENCOUNTER — READMISSION MANAGEMENT (OUTPATIENT)
Dept: CALL CENTER | Facility: HOSPITAL | Age: 57
End: 2022-11-18

## 2022-11-18 VITALS
OXYGEN SATURATION: 93 % | SYSTOLIC BLOOD PRESSURE: 132 MMHG | HEIGHT: 70 IN | DIASTOLIC BLOOD PRESSURE: 78 MMHG | BODY MASS INDEX: 41.95 KG/M2 | WEIGHT: 293 LBS | HEART RATE: 95 BPM | TEMPERATURE: 98.6 F | RESPIRATION RATE: 18 BRPM

## 2022-11-18 LAB
ANION GAP SERPL CALCULATED.3IONS-SCNC: 8.4 MMOL/L (ref 5–15)
ANISOCYTOSIS BLD QL: ABNORMAL
BASOPHILS # BLD MANUAL: 0.15 10*3/MM3 (ref 0–0.2)
BASOPHILS NFR BLD MANUAL: 1 % (ref 0–1.5)
BUN SERPL-MCNC: 34 MG/DL (ref 6–20)
BUN/CREAT SERPL: 19.9 (ref 7–25)
CALCIUM SPEC-SCNC: 8.2 MG/DL (ref 8.6–10.5)
CHLORIDE SERPL-SCNC: 97 MMOL/L (ref 98–107)
CO2 SERPL-SCNC: 25.6 MMOL/L (ref 22–29)
CREAT SERPL-MCNC: 1.71 MG/DL (ref 0.57–1)
DEPRECATED RDW RBC AUTO: 52.7 FL (ref 37–54)
DEPRECATED RDW RBC AUTO: 53.2 FL (ref 37–54)
EGFRCR SERPLBLD CKD-EPI 2021: 34.6 ML/MIN/1.73
EOSINOPHIL # BLD MANUAL: 0.3 10*3/MM3 (ref 0–0.4)
EOSINOPHIL NFR BLD MANUAL: 2 % (ref 0.3–6.2)
ERYTHROCYTE [DISTWIDTH] IN BLOOD BY AUTOMATED COUNT: 15.8 % (ref 12.3–15.4)
ERYTHROCYTE [DISTWIDTH] IN BLOOD BY AUTOMATED COUNT: 15.8 % (ref 12.3–15.4)
GLUCOSE SERPL-MCNC: 109 MG/DL (ref 65–99)
HCT VFR BLD AUTO: 25.5 % (ref 34–46.6)
HCT VFR BLD AUTO: 25.9 % (ref 34–46.6)
HGB BLD-MCNC: 8.2 G/DL (ref 12–15.9)
HGB BLD-MCNC: 8.4 G/DL (ref 12–15.9)
HYPOCHROMIA BLD QL: ABNORMAL
LYMPHOCYTES # BLD MANUAL: 1.66 10*3/MM3 (ref 0.7–3.1)
LYMPHOCYTES NFR BLD MANUAL: 13 % (ref 5–12)
MCH RBC QN AUTO: 31.7 PG (ref 26.6–33)
MCH RBC QN AUTO: 31.8 PG (ref 26.6–33)
MCHC RBC AUTO-ENTMCNC: 32.2 G/DL (ref 31.5–35.7)
MCHC RBC AUTO-ENTMCNC: 32.4 G/DL (ref 31.5–35.7)
MCV RBC AUTO: 97.7 FL (ref 79–97)
MCV RBC AUTO: 98.8 FL (ref 79–97)
METAMYELOCYTES NFR BLD MANUAL: 1 % (ref 0–0)
MONOCYTES # BLD: 1.96 10*3/MM3 (ref 0.1–0.9)
MYELOCYTES NFR BLD MANUAL: 1 % (ref 0–0)
NEUTROPHILS # BLD AUTO: 10.71 10*3/MM3 (ref 1.7–7)
NEUTROPHILS NFR BLD MANUAL: 71 % (ref 42.7–76)
PLATELET # BLD AUTO: 76 10*3/MM3 (ref 140–450)
PLATELET # BLD AUTO: 83 10*3/MM3 (ref 140–450)
PMV BLD AUTO: 10 FL (ref 6–12)
PMV BLD AUTO: 8.9 FL (ref 6–12)
POTASSIUM SERPL-SCNC: 4.2 MMOL/L (ref 3.5–5.2)
RBC # BLD AUTO: 2.58 10*6/MM3 (ref 3.77–5.28)
RBC # BLD AUTO: 2.65 10*6/MM3 (ref 3.77–5.28)
SCAN SLIDE: NORMAL
SMALL PLATELETS BLD QL SMEAR: ABNORMAL
SODIUM SERPL-SCNC: 131 MMOL/L (ref 136–145)
VARIANT LYMPHS NFR BLD MANUAL: 11 % (ref 19.6–45.3)
WBC NRBC COR # BLD: 15.08 10*3/MM3 (ref 3.4–10.8)
WBC NRBC COR # BLD: 15.45 10*3/MM3 (ref 3.4–10.8)

## 2022-11-18 PROCEDURE — 85025 COMPLETE CBC W/AUTO DIFF WBC: CPT | Performed by: STUDENT IN AN ORGANIZED HEALTH CARE EDUCATION/TRAINING PROGRAM

## 2022-11-18 PROCEDURE — 99232 SBSQ HOSP IP/OBS MODERATE 35: CPT | Performed by: INTERNAL MEDICINE

## 2022-11-18 PROCEDURE — 25010000002 HYDROMORPHONE 1 MG/ML SOLUTION: Performed by: STUDENT IN AN ORGANIZED HEALTH CARE EDUCATION/TRAINING PROGRAM

## 2022-11-18 PROCEDURE — 80048 BASIC METABOLIC PNL TOTAL CA: CPT | Performed by: STUDENT IN AN ORGANIZED HEALTH CARE EDUCATION/TRAINING PROGRAM

## 2022-11-18 PROCEDURE — 25010000002 HEPARIN (PORCINE) PER 1000 UNITS: Performed by: HOSPITALIST

## 2022-11-18 PROCEDURE — 85007 BL SMEAR W/DIFF WBC COUNT: CPT | Performed by: STUDENT IN AN ORGANIZED HEALTH CARE EDUCATION/TRAINING PROGRAM

## 2022-11-18 PROCEDURE — 25010000002 MORPHINE PER 10 MG: Performed by: HOSPITALIST

## 2022-11-18 PROCEDURE — 85027 COMPLETE CBC AUTOMATED: CPT | Performed by: STUDENT IN AN ORGANIZED HEALTH CARE EDUCATION/TRAINING PROGRAM

## 2022-11-18 PROCEDURE — 25010000002 PROCHLORPERAZINE 10 MG/2ML SOLUTION: Performed by: INTERNAL MEDICINE

## 2022-11-18 RX ADMIN — HYDROMORPHONE HYDROCHLORIDE 1 MG: 1 INJECTION, SOLUTION INTRAMUSCULAR; INTRAVENOUS; SUBCUTANEOUS at 16:08

## 2022-11-18 RX ADMIN — PANTOPRAZOLE SODIUM 40 MG: 40 TABLET, DELAYED RELEASE ORAL at 06:49

## 2022-11-18 RX ADMIN — BUMETANIDE 2 MG: 1 TABLET ORAL at 07:58

## 2022-11-18 RX ADMIN — MORPHINE SULFATE 2 MG: 2 INJECTION, SOLUTION INTRAMUSCULAR; INTRAVENOUS at 04:40

## 2022-11-18 RX ADMIN — NYSTATIN 1 APPLICATION: 100000 CREAM TOPICAL at 08:05

## 2022-11-18 RX ADMIN — FLUTICASONE PROPIONATE 2 SPRAY: 50 SPRAY, METERED NASAL at 08:05

## 2022-11-18 RX ADMIN — Medication 500 MCG: at 07:59

## 2022-11-18 RX ADMIN — PROCHLORPERAZINE EDISYLATE 10 MG: 5 INJECTION INTRAMUSCULAR; INTRAVENOUS at 16:08

## 2022-11-18 RX ADMIN — OXYCODONE HYDROCHLORIDE 15 MG: 15 TABLET ORAL at 08:00

## 2022-11-18 RX ADMIN — BENZONATATE 100 MG: 100 CAPSULE ORAL at 12:41

## 2022-11-18 RX ADMIN — HEPARIN SODIUM 5000 UNITS: 5000 INJECTION INTRAVENOUS; SUBCUTANEOUS at 07:59

## 2022-11-18 RX ADMIN — BENZONATATE 100 MG: 100 CAPSULE ORAL at 06:49

## 2022-11-18 RX ADMIN — OXYCODONE HYDROCHLORIDE 15 MG: 15 TABLET ORAL at 12:26

## 2022-11-18 RX ADMIN — OXYCODONE 10 MG: 5 TABLET ORAL at 02:24

## 2022-11-18 RX ADMIN — GUAIFENESIN AND DEXTROMETHORPHAN 5 ML: 100; 10 SYRUP ORAL at 06:49

## 2022-11-18 RX ADMIN — BENZONATATE 100 MG: 100 CAPSULE ORAL at 02:24

## 2022-11-18 RX ADMIN — LEVOTHYROXINE SODIUM 100 MCG: 100 TABLET ORAL at 07:59

## 2022-11-18 RX ADMIN — GUAIFENESIN AND DEXTROMETHORPHAN 5 ML: 100; 10 SYRUP ORAL at 02:23

## 2022-11-18 RX ADMIN — ZINC OXIDE: 200 OINTMENT TOPICAL at 08:06

## 2022-11-18 RX ADMIN — METOPROLOL TARTRATE 12.5 MG: 25 TABLET, FILM COATED ORAL at 07:59

## 2022-11-18 NOTE — PROGRESS NOTES
Psychiatric HOSPITALIST PROGRESS NOTE     Patient Identification:  Name:  Malina Velasquez  Age:  57 y.o.  Sex:  female  :  1965  MRN:  [id number]  Visit Number:  [id number]  ROOM: [address] [place] [address]     Primary Care Provider:  Jeremiah Topete MD    Length of stay in inpatient status:  7    Subjective     Chief Compliant:    Chief Complaint   Patient presents with   • Weakness - Generalized     Pt states dr sent her here for high wbc and kidney function        History of Presenting Illness: Patient seen and evaluated in follow-up for severe sepsis secondary to right lower extremity cellulitis with history of MRSA right knee infection with pyogenic arthritis and SUZETTE on CKD.  Patient with congested cough, complaints of pain in right leg after having it moved by staff during bedside care after bowel movement.    Objective     Current Hospital Meds:  bumetanide, 2 mg, Oral, Daily  fluticasone, 2 spray, Nasal, Daily  folic acid, 500 mcg, Oral, Daily  heparin (porcine), 5,000 Units, Subcutaneous, Q12H  levothyroxine, 100 mcg, Oral, Daily  loperamide, 2 mg, Oral, Once  metoprolol tartrate, 12.5 mg, Oral, Q12H  nystatin, 1 application, Topical, BID  oxyCODONE, 15 mg, Oral, 4x Daily  pantoprazole, 40 mg, Oral, QAM  sodium chloride, 10 mL, Intravenous, Q12H  sodium chloride, 10 mL, Intravenous, Q12H  sodium chloride, 10 mL, Intravenous, Q12H  sodium chloride, 10 mL, Intravenous, Q12H  vancomycin, 1,250 mg, Intravenous, Q24H  zinc oxide, , Topical, BID         ----------------------------------------------------------------------------------------------------------------------  Vital Signs:  Temp:  [97.9 °F (36.6 °C)-98.6 °F (37 °C)] 98.6 °F (37 °C)  Heart Rate:  [] 112  Resp:  [11-21] 19  BP: ()/(48-84) 129/71  SpO2:  [92 %-97 %] 96 %  on   ;   Device (Oxygen Therapy): room air  Body mass index is 91.11 kg/m².      Intake/Output Summary (Last 24 hours) at 2022 1920  Last data filed at  11/17/2022 1800  Gross per 24 hour   Intake 660 ml   Output 350 ml   Net 310 ml      ----------------------------------------------------------------------------------------------------------------------  Physical exam:  Constitutional: Morbidly obese chronically ill-appearing female resting in bed, NAD  HENT:  Head:  Normocephalic and atraumatic.  Mouth:  Moist mucous membranes.    Eyes:  Conjunctivae and EOM are normal. No scleral icterus.    Cardiovascular:  Normal rate, regular rhythm and normal heart sounds with no murmur.  Pulmonary/Chest:  No respiratory distress, no wheezes, no crackles, with normal breath sounds and good air movement.  Abdominal:  Soft.  Bowel sounds are normal.  No distension and no tenderness.   Musculoskeletal: Tenderness to palpation of the right lower extremity with out deformity.  No red or swollen joints anywhere.  Functional ROM intact.   Neurological:  Alert and oriented to person, place, and time.  No cranial nerve deficit.  No tongue deviation.  No facial droop.  No slurred speech.   Skin:  Skin is warm and dry.  Mild erythema of the very distal end of the right lower extremity abutting the dorsum of the right foot with some erythema present there as well.  Peripheral vascular:  Pulses in all 4 extremities with no clubbing, no cyanosis, bilateral extensive lymphedema present, right greater than left.  Psychiatric: Appropriate mood and affect, pleasant.   ----------------------------------------------------------------------------------------------------------------------  WBC/HGB/HCT/PLT   14.19/8.2/24.3/56 (11/17 0918)  BUN/CREAT/GLUC/ALT/AST/LISSETT/LIP    35/1.87/120/--/--/--/-- (11/17 0918)  LYTES - Na/K/Cl/CO2: 132*/4.1/97*/27.6 (11/17 0918)        No results found for: URINECX  Blood Culture   Date Value Ref Range Status   11/10/2022 No growth at 4 days  Preliminary   11/10/2022 No growth at 4 days  Preliminary       I have personally looked at the labs and they are summarized  above.  ----------------------------------------------------------------------------------------------------------------------  Detailed radiology reports for the last 24 hours:  US Abdomen Complete    Result Date: 11/16/2022    The spleen measures 13.1 cm in maximum dimension. Probably within normal limits for size.  This report was finalized on 11/16/2022 8:09 AM by Dr. Ryan Whitney MD.      XR Chest 1 View    Result Date: 11/16/2022  1.  Some improved aeration specifically of the left lung base. 2.  Tiny bilateral pleural effusions.  This report was finalized on 11/16/2022 8:08 AM by Dr. Ryan Whitney MD.      Assessment & Plan      Severe sepsis  Septic shock per CMS guidelines  Lactic acidemia  Right lower extremity cellulitis and lymphedema  Right knee chronic osteomyelitis    -Patient seen and evaluated by orthopedic surgery who feels risk of surgery outweighs benefit for any intervention at this time.    -Infectious disease consulted and following along and recommending 8-week course of antibiotic therapy with vancomycin followed by oral suppressive therapy.    -Consult placed for PICC line placement for above prolonged antibiotic therapy and successfully placed 11/15/22    -Continue Ace wrappings to lower extremities to help with lymphedema.    -Patient planned for discharge today, but cancelled due to difficulties with finding accepting home health service for at home antibiotic therapy.     SUZETTE on CKD    -Creatinine 4.4 admission improved with supportive care and antibiotic therapy and fluids.  Improved to 1.87 today.    -Nephrology consulting on along and worried about possibility of HUS with her anemia and thrombocytopenia and request hematology consultation.  Has signed off today.    -Hematology consulted and seen and evaluated patient and agrees no evidence of HUS or TTP given lack of evidence of hemolysis on peripheral smear as well as normal normal LDH and haptoglobin.  ADAMTS 13 reduced but not  severe enough to be consistent with hemolysis.     Chronic anemia  Chronic thrombocytopenia  Chronic liver cirrhosis  Chronic hepatitis C    -Patient with history of cirrhosis which would be consistent with patient's cytopenias.    -Hematology/oncology consulted and recommendations as above.    -Started on folate supplementation     -Platelets improved, continue to monitor    -Home bumex resumed     Chronic HFpEF    -TTE with grade 1 diastolic dysfunction, mild LV hypertrophy and mild aortic valve stenosis.    -CXR still with evidence of volume, will resume diuretics cautiously given recovering SUZETTE.    Essential hypertension    -Continue home metoprolol    Hypothyroidism    -Continue home levothyroxine      Copied text in portions of the note has been reviewed and is accurate as of 11/17/22    VTE Prophylaxis:   Mechanical Order History:     None      Pharmalogical Order History:      Ordered     Dose Route Frequency Stop    11/10/22 7512  heparin (porcine) 5000 UNIT/ML injection 5,000 Units         5,000 Units SC Every 12 Hours Scheduled --                Disposition home with IV antibiotic therapy   Michael Roman DO  [facility] Hospitalist  11/17/22  19:20 EST

## 2022-11-18 NOTE — DISCHARGE INSTR - APPOINTMENTS
You have a follow-up appointment with DR Jose Jacobson on 11-29-22 at 9:30. Office number [phone]

## 2022-11-18 NOTE — NURSING NOTE
Pt being discharged. Iv to remain for antibiotic therapy. No report to be called per [facility] case management to [facility]. Tele removed and returned

## 2022-11-18 NOTE — DISCHARGE PLACEMENT REQUEST
"Malina Velasquez (57 y.o. Female)     Date of Birth   1965    Social Security Number       Address   [address] [facility] [address]    Home Phone   [phone]    MRN   [id number]       Worship   None    Marital Status                               Admission Date   11/10/22    Admission Type   Emergency    Admitting Provider   Miguel Licea MD    Attending Provider   Michael Roman DO    Department, Room/Bed   [address], 3334/       Discharge Date       Discharge Disposition       Discharge Destination                               Attending Provider: Michael Roman DO    Allergies: Sulfa Antibiotics, Nsaids, Penicillins    Isolation: None   Infection: None   Code Status: CPR    Ht: 177.8 cm (70\")   Wt: 288 kg (635 lb)    Admission Cmt: None   Principal Problem: Acute on chronic renal failure (HCC) [N17.9,N18.9]                 Active Insurance as of 11/10/2022     Primary Coverage     Payor Plan Insurance Group Employer/Plan Group    [place] [id number]     Payor Plan Address Payor Plan Phone Number Payor Plan Fax Number Effective Dates    [employer] [address] [address] [phone]  1/1/2021 - None Entered    [facility] [id number]       Subscriber Name Subscriber Birth Date Member ID       MALINA VELASQUEZ 1965 [id number]                 Emergency Contacts      (Rel.) Home Phone Work Phone Mobile Phone    Ant Velasquez (Spouse) [phone] -- --    GLADIS HODGE (Daughter) [phone] -- --        Patient Lines/Drains/Airways Status    Active Lines, Drains, and Airways    PICC Double Lumen 11/15/22 Right Basilic   Placement date 11/15/22  Site Basilic    Placement time 1622  Days 2   Assessments           History & Physical      Maria Isabel Draper PA-C at 11/10/22 2009     Attestation signed by Miguel Licea MD at 11/10/22 1654 (Updated)    I have seen and examined the patient independently from Maria Isabel " ARNALDO Draper, and have reviewed this documentation and agree. Continue broad spectrum IV antibiotics to cover what appears to be right foot cellulitis superimposed on severe chronic lymphedema, along with concern for untreated pyogenic arthritis of right knee that was diagnosed per joint aspiration in 2022 as documented in Maria Isabel's note. Await further recommendations from ID and orthopedic surgery. Appreciate nephrology's recommendations regarding IV fluids. Will monitor closely for signs/symptoms of developing fluid overload in light of patient's history of CHF. Extent of CHF unknown; will follow up on ECHO ordered for tomorrow morning. Regarding patient's report of exposure to grandchildren with flu, she clarified to me that her grandchildren had cold and flu-like symptoms but tested negative for flu A and B. Respiratory PCR panel has been ordered to rule out other infectious causes, result is still pending.                       [facility]  History & Physical    Patient Identification:  Name:  Malina Velasquez  Age:  57 y.o.  Sex:  female  :  1965  MRN:  [id number]   Visit Number:  [id number]  Primary Care Physician:  Jeremiah Topete MD    Subjective     11/10/2022   Chief complaint:   Chief Complaint   Patient presents with   • Weakness - Generalized     Pt states dr sent her here for high wbc and kidney function      History of presenting illness:      Malina Velasquez is a 57 y.o. female with past medical history significant for CHF, essential hypertension, history of MVP, COPD, chronic hepatitis C, and former tobacco abuse who presents to [facility] emergency department with complaints of generalized weakness.     Patient states that she seen her PCP on Monday for annual blood work and was called back this morning due to her labs being abnormal. Specifically she was told that her creatinine was 4 and her WBC was 23K. She states that she lives  "with her grandchildren who was recently diagnosed with the Flu. She reports that approximately 10 days ago she developed symptoms similar to what they were experiencing. She states that she developed diarrhea and used \"3 boxes of Imodium over 1 weekend.\" She states that the diarrhea is now resolved and she had a normal bowel movement on Monday. She reports that she also had some nausea and vomiting which she attributes to not having good oral intake and continuing to take her home medications on an empty stomach. However, Tuesday night she developed a cough that is productive with green-tinged sputum. She also complains of congestion, nasal drainage, and sore throat. She did take 4 doses of left-over doxycycline and a dose of Suvextro to \"clear up her infection.\" She does report chills, but denies any fevers. She denies any abdominal pain. She denies any chest pain. She does report sediment in her urine, but denies any urinary symptoms.    Patient also explains that she has had worsening swelling of her right lower extremity over the past month. She can hardly tolerate the right knee or her right lower extremity to be touched or moved during exam. She states 3 years ago she had spontaneous MRSA infection of her right knee in which she had an arthroscopic knee joint washout performed in [place] and was treated with an extended course of antibiotics. Per chart review, she was seen by [facility] 07/2022 who performed joint aspiration and was diagnosed with pyogenic arthritis of right knee due to unspecified organism. Patient states that she does not remember completing a course of antibiotics after the aspiration of the joint was performed.     Upon arrival to the ED, vital signs were temperature 98.7, heart rate 95, respirations 16, /76, SpO2 95% on room air. CMP with glucose 114, sodium 133, chloride 94, creatinine 4.41, BUN 67, eGFR 11.1, alkaline phosphatase 134, albumin 2.68, " otherwise within normal limits. CBC with WBC 13.35, RBC 3.01, hemoglobin 9.5, hematocrit 29.4, MCV 97.7, platelets 56, otherwise unremarkable. C-RP 20.13. Lactate 5.0. Procalcitonin 1.13. Hemoglobin A1c 4.7. TSH 0.960. Pending peripheral blood cultures x 2. Chest XR with cardiomegaly and pulmonary vascular congestion and right basilar airspace disease. CT abdomen/pelvis with no acute intra-abdominal abnormality, mildly nodular contour of the liver which may reflect early cirrhosis, and borderline splenomegaly. CT chest with no acute cardiopulmonary disease, small calcified pulmonary nodule of left upper lobe likely pulmonary granuloma, bibasilar subsegmental atelectasis, small hiatal hernia.     Known Emergency Department medications received prior to my evaluation included IV Vancomycin, IV Merrem, IV Dilaudid 1 mg, IV Zofran 4 mg, sepsis bolus (2055 mL). Emergency Department Room location at the time of my evaluation was 406.     Patient will be admitted to the PCU for further evaluation and monitoring.     ---------------------------------------------------------------------------------------------------------------------   Review of Systems   Constitutional: Positive for appetite change (decreased) and chills. Negative for activity change and fever.   HENT: Positive for congestion, rhinorrhea and sore throat.    Eyes: Negative for discharge and redness.   Respiratory: Positive for cough (productive with green tinged sputum). Negative for apnea, shortness of breath and wheezing.    Cardiovascular: Positive for leg swelling (right lower extremity). Negative for chest pain and palpitations.   Gastrointestinal: Positive for diarrhea (reports having normal bowel movement on Monday), nausea and vomiting. Negative for abdominal distention and abdominal pain.   Genitourinary: Negative for difficulty urinating, dysuria, frequency and urgency.        Reports sediment in urine   Musculoskeletal: Positive for arthralgias  (right knee) and joint swelling (right knee).   Skin: Negative for color change and wound.   Neurological: Positive for weakness (generalized). Negative for dizziness and light-headedness.   Psychiatric/Behavioral: Negative for agitation, behavioral problems and confusion.        ---------------------------------------------------------------------------------------------------------------------   Past Medical History:   Diagnosis Date   • CHF (congestive heart failure) ([facility])    • COPD (chronic obstructive pulmonary disease) ([facility])    • Edema    • Hep C w/o coma, chronic ([facility])    • Hypertension    • Mitral valve prolapse      Past Surgical History:   Procedure Laterality Date   • GASTRIC BANDING     • JOINT ASPIRATION AND/OR INJECTION Right 07/2022     Family History   Problem Relation Age of Onset   • Hypertension Mother    • Breast cancer Mother    • COPD Father      Social History     Socioeconomic History   • Marital status:    Tobacco Use   • Smoking status: Never   Vaping Use   • Vaping Use: Never used   Substance and Sexual Activity   • Alcohol use: Not Currently   • Drug use: Never   • Sexual activity: Defer     ---------------------------------------------------------------------------------------------------------------------   Allergies:  Sulfa antibiotics and Penicillins  ---------------------------------------------------------------------------------------------------------------------   Home medications:    Medications below are reported home medications pulling from within the system; at this time, these medications have not been reconciled unless otherwise specified and are in the verification process for further verifcation as current home medications.  Medications Prior to Admission   Medication Sig Dispense Refill Last Dose   • bumetanide (BUMEX) 2 MG tablet Take 1 tablet by mouth Daily.      • hydrALAZINE (APRESOLINE) 10 MG tablet Take 1 tablet by mouth 3 (Three) Times a Day.      •  levothyroxine (SYNTHROID, LEVOTHROID) 25 MCG tablet Take 1 tablet by mouth Daily.      • metoprolol succinate XL (TOPROL-XL) 25 MG 24 hr tablet Take 1 tablet by mouth Daily.      • oxyCODONE-acetaminophen (PERCOCET)  MG per tablet Take 1 tablet by mouth Every 6 (Six) Hours As Needed for Moderate Pain.      • traZODone (DESYREL) 50 MG tablet Take 1 tablet by mouth Every Night.          Hospital Scheduled Meds:  heparin (porcine), 5,000 Units, Subcutaneous, Q12H  [START ON 11/11/2022] meropenem, 1 g, Intravenous, Q12H  sodium chloride, 10 mL, Intravenous, Q12H  [START ON 11/11/2022] vancomycin, 500 mg, Intravenous, Q24H      sodium chloride, 125 mL/hr, Last Rate: 125 mL/hr (11/10/22 2008)        Current listed hospital scheduled medications may not yet reflect those currently placed in orders that are signed and held awaiting patient's arrival to floor.   ---------------------------------------------------------------------------------------------------------------------     Objective     Vital Signs:  Temp:  [98.7 °F (37.1 °C)] 98.7 °F (37.1 °C)  Heart Rate:  [95] 95  Resp:  [16] 16  BP: (120-131)/(76-89) 120/89      11/10/22  1340   Weight: (!) 181 kg (399 lb)     Body mass index is 57.25 kg/m².  ---------------------------------------------------------------------------------------------------------------------       Physical Exam  Constitutional:       General: She is awake.      Appearance: She is obese. She is ill-appearing (disheveled).      Comments: Patient resting in bed. Appears in no acute distress.    HENT:      Head: Normocephalic and atraumatic.      Right Ear: External ear normal.      Left Ear: External ear normal.      Nose: Nose normal.      Mouth/Throat:      Mouth: Mucous membranes are moist.      Pharynx: Oropharynx is clear.   Eyes:      Extraocular Movements: Extraocular movements intact.      Conjunctiva/sclera: Conjunctivae normal.      Pupils: Pupils are equal, round, and reactive to  light.   Cardiovascular:      Rate and Rhythm: Normal rate and regular rhythm.      Pulses: Normal pulses.           Dorsalis pedis pulses are 2+ on the right side and 2+ on the left side.        Posterior tibial pulses are 2+ on the right side and 2+ on the left side.      Heart sounds: Murmur (Left upper sternal border) heard.   Pulmonary:      Effort: Pulmonary effort is normal. No accessory muscle usage or respiratory distress.      Breath sounds: Normal breath sounds. No decreased breath sounds, wheezing, rhonchi or rales.      Comments: Difficult exam due to body habitus. No obvious wheezes, crackles, or rhonchi on auscultation.  Abdominal:      General: Abdomen is flat. Bowel sounds are normal. There is no distension.      Palpations: Abdomen is soft.      Tenderness: There is no abdominal tenderness. There is no guarding.   Musculoskeletal:         General: Swelling (right lower extremity) and tenderness (right lower extremity) present.      Cervical back: Normal range of motion and neck supple.      Right lower leg: Edema present.      Left lower leg: No edema.      Comments: ROM of right lower limited due to pain.    Skin:     General: Skin is warm and dry.      Findings: Erythema present.      Comments: Right foot with slight erythema on dorsal surface. Increased edema and warmth noted of RLE and right knee compared to LLE. No obvious open wounds or drainage. Difficult to assess posterior aspect of leg due to patient complaining of pain.     Not able to assess back or gluteal area due to patient refusing to turn. Will attempt to assess when patient arrives to floor and able to get assistance to turn patient.    Neurological:      General: No focal deficit present.      Mental Status: She is alert and oriented to person, place, and time. Mental status is at baseline.   Psychiatric:         Mood and Affect: Mood normal.         Behavior: Behavior normal. Behavior is cooperative.         Thought Content:  Thought content normal.         ---------------------------------------------------------------------------------------------------------------------  EKG:    Pending cardiology read. Per my review, EKG reveals NSR with HR 89 and QTc 476 ms without acute ischemic changes.         I have personally looked at both the EKG and the telemetry strips.  ---------------------------------------------------------------------------------------------------------------------   Results from last 7 days   Lab Units 11/10/22  1800 11/10/22  1546 11/10/22  1433   CRP mg/dL  --   --  20.13*   LACTATE mmol/L 4.2*  --  5.0*   WBC 10*3/mm3  --   --  13.35*   HEMOGLOBIN g/dL  --   --  9.5*   HEMATOCRIT %  --   --  29.4*   MCV fL  --   --  97.7*   MCHC g/dL  --   --  32.3   PLATELETS 10*3/mm3  --   --  56*   INR   --  1.12*  --          Results from last 7 days   Lab Units 11/10/22  1433   SODIUM mmol/L 133*   POTASSIUM mmol/L 5.2   CHLORIDE mmol/L 94*   CO2 mmol/L 25.9   BUN mg/dL 67*   CREATININE mg/dL 4.41*   CALCIUM mg/dL 9.1   GLUCOSE mg/dL 114*   ALBUMIN g/dL 2.68*   BILIRUBIN mg/dL 0.8   ALK PHOS U/L 134*   AST (SGOT) U/L 22   ALT (SGPT) U/L 9   Estimated Creatinine Clearance: 25.3 mL/min (A) (by C-G formula based on SCr of 4.41 mg/dL (H)).  No results found for: AMMONIA  Results from last 7 days   Lab Units 11/10/22  1433   TROPONIN T ng/mL 0.016         Lab Results   Component Value Date    HGBA1C 4.70 (L) 11/10/2022     Lab Results   Component Value Date    TSH 0.960 11/10/2022     No results found for: PREGTESTUR, PREGSERUM, HCG, HCGQUANT  Pain Management Panel    There is no flowsheet data to display.           ---------------------------------------------------------------------------------------------------------------------  Imaging Results (Last 7 Days)     Procedure Component Value Units Date/Time    CT Lower Extremity Right Without Contrast [[id number]] Resulted: 11/10/22 2229     Updated: 11/10/22 2246    CT Chest  Without Contrast Diagnostic [[id number]] Collected: 11/10/22 1720     Updated: 11/10/22 1723    Narrative:      CT Chest WO    CLINICAL HISTORY: soa.    COMPARISON: None.    TECHNIQUE: CT chest without contrast. Coronal and sagittal reconstructions were obtained.  Radiation dose reduction techniques included automated exposure control or exposure modulation based on body size. Count of known CT and cardiac nuc med studies  performed in previous 12 months: 0.     FINDINGS:    Lungs: Bibasilar subsegmental atelectasis. No focal consolidation or mass. Small calcified pulmonary nodule in left upper lobe, likely pulmonary granuloma.    Pleura: No pleural effusions.    Cardiovascular: Normal heart size. No pericardial effusion. Normal course and caliber of the thoracic aorta.    Mediastinum: Evaluation is limited by lack of intravenous contrast. Small calcified hilar mediastinal lymph nodes.    Osseous: No acute osseous abnormality. Degenerative changes of the thoracic spine and dextroconvex curvature of the thoracic spine.    Other: None.    Upper abdomen: Small hiatal hernia.       Impression:        1.  No acute cardiopulmonary disease.   2.  Bibasilar subsegmental atelectasis.  3.  Small hiatal hernia.    Signer Name: Mark Bird MD   Signed: 11/10/2022 5:20 PM   Workstation Name: [id number]    [facility]    CT Abdomen Pelvis Without Contrast [[id number]] Collected: 11/10/22 1717     Updated: 11/10/22 1719    Narrative:      CT Abdomen Pelvis WO    INDICATION:   abd pain    TECHNIQUE:   CT of the abdomen and pelvis without IV contrast. Coronal and sagittal reconstructions were obtained.  Radiation dose reduction techniques included automated exposure control or exposure modulation based on body size. Count of known CT and cardiac nuc  med studies performed in previous 12 months: 0.     COMPARISON:   None available.    FINDINGS:  Evaluation of abdominal viscera is limited due to lack of intravenous  contrast. Study is also degraded by patient body habitus.    Lower chest: Unremarkable.  Liver: Mildly nodular in contour. No solid mass.  Gallbladder and bile ducts: Surgically absent gallbladder. No biliary dilatation.  Spleen: Borderline enlarged with scattered punctate calcifications, likely sequelae of old granulomatous disease.  Pancreas: Diffusely atrophic.  Adrenals: Unremarkable.  Kidneys and ureters: No renal or ureteral stones. No hydronephrosis.  Bowel: Postsurgical changes of the stomach. Nondilated bowel with no wall thickening.Appendix not visualized, but no inflammatory changes present in the right lower quadrant.  Bladder: Underdistended.  Reproductive organs: Normal uterus. No adnexal masses.  Lymph nodes: Unremarkable.  Peritoneum: Unremarkable.  Vessels: Prominent portosystemic collateral vessels.  Abdominal wall: Unremarkable.  Bones: Multilevel degenerative changes of the lumbar spine. Levoconvex curvature of the lumbar spine..      Impression:        1.  Exam is limited by lack of intravenous contrast and patient body habitus.  2.  No acute intra-abdominal abnormality.  3.  Mildly nodular contour of the liver which may reflect early cirrhosis. Recommend correlation with liver function tests.  4.  Borderline splenomegaly.    Signer Name: Mark Bird MD   Signed: 11/10/2022 5:17 PM   Workstation Name: [id number]    [facility]    XR Chest 1 View [[id number]] Collected: 11/10/22 1615     Updated: 11/10/22 1617    Narrative:      EXAM:    XR Chest, 1 View     EXAM DATE:    11/10/2022 3:13 PM     CLINICAL HISTORY:    cough     TECHNIQUE:    Frontal view of the chest.     COMPARISON:    No relevant prior studies available.     FINDINGS:    Lungs:  Pulmonary vascular congestion.  Right basilar airspace  disease.    Pleural space:  Unremarkable.  No pneumothorax.    Heart:  Marked cardiomegaly.    Mediastinum:  Unremarkable.    Bones/joints:  Unremarkable.       Impression:       1.  Cardiomegaly and pulmonary vascular congestion.  2.  Right basilar airspace disease.     This report was finalized on 11/10/2022 4:15 PM by Dr. Carlos Manuel King MD.           I have personally reviewed the above radiology images and read the final radiology report on 11/10/22  ---------------------------------------------------------------------------------------------------------------------  Assessment / Plan     Active Hospital Problems    Diagnosis  POA   • **Acute on chronic renal failure (HCC) [N17.9, N18.9]  Yes       ASSESSMENT/PLAN:  -Severe sepsis criteria 2/2 undetermined etiology at present, POA  -Cellulitis of right foot and possible recurrent versus persistent septic arthritis of right knee joint , POA  -History of MRSA infection of right knee joint s/p washout  -History of pyogenic arthritis of right knee joint 07/2022  -Reported recent nausea, vomiting, and diarrhea  -Meets severe sepsis criteria with HR >90, C-RP 20.13, WBC 13.35, lactate 5.0, and procalcitonin 1.13.   -Urinalysis with no evidence of acute UTI.   -No acute intra-abdominal findings on CT abdomen/pelvis.  -CT chest with bibasilar atelectasis, but no evidence of opacities or consolidations to suggest pneumonia.  -Reports history of spontaneous MRSA infection of right knee joint requiring arthroscopic washout at [place] and treated with extended course of antibiotics. Seen by [facility] 07/2022 to have joint aspiration performed and diagnosed with pyogenic arthritis of right knee; patient denies completing course of antibiotics.   -Received IV Vancomycin and IV Merrem in ED; will continue on admission while awaiting culture results.   -Received sepsis bolus (2055 mL) in ED; continuous IV fluids ordered.   -Infectious disease and orthopedic surgery consults ordered, input and assistance much appreciated.   -Will monitor off imodium and if diarrhea reoccurs may consider further work-up for C. Diff.  -Obtain  venous doppler of RLE to r/o DVT.    -Obtain respiratory panel PCR.  -Repeat a.m. CBC and C-RP.Trend lactate until normalized. Follow-up on blood cultures.     -Acute on chronic kidney disease, POA  -Creatinine upon arrival 4.41; per verbal report from ED, creatinine preivously 1.34.   -ED provider discussed with on-call nephrology recommending NS @ 125 mL/hr after sepsis bolus.  -Formal inpatient nephrology consult ordered, input and assistance much appreciated.   -No evidence of obstructive uropathy on CT abdomen/pelvis.   -Avoid nephrotoxins as able; will hold home Bumex.   -Repeat a.m. CMP.     -Documented history of CHF  -No previous echo on file; obtain a.m. TTE.     -Essential hypertensin  -BP currently stable.   -Monitor per hospital protocol.   -Review home medications once reconciled.     -Macrocytic anemia  -Thrombocytopenia  -Possible 2/2 SUZETTE versus liver cirrhosis.   -Baseline unknown.   -Obtain iron panel, vitamin B12, and folate levels.   -Obtain peripheral blood smear.   -Repeat a.m. CBC.     -Liver cirrhosis   -Chronic hepatitis C  -Hypoalbuminemia  -Liver enzymes normal.   -May benefit from referral to hepatology/GI at discharge.  -Repeat a.m. CMP.     -Hypothyoridism  -TSH normal.   -Resume home Synthroid.     -Morbid obesity, BMI 57.25 kg/m2  -S/P gastric sleeve 2019  -Complicates all aspects of care  -------------------------------  F/E/N: Continuous NS @ 125 mL/hr. Replace electrolytes as needed. Regular diet.   DVT prophylaxis: SQ Heparin   Activity: Up with assistance; fall precautions  -------------------------------  CODE STATUS: Full    High risk secondary to sepsis of undetermined etiology at present, cellulitis of RLE, possible recurrent vs persistent septic arthritis of right knee joint, acute on CKD  -------------------------------  Expected length of stay:  INPATIENT status due to the need for care which can only be reasonably provided in an hospital setting such as  aggressive/expedited ancillary services and/or consultation services, the necessity for IV medications, close physician monitoring and/or the possible need for procedures.  In such, I feel patient's risk for adverse outcomes and need for care warrant INPATIENT evaluation and predict the patient's care encounter to likely last beyond 2 midnights.     Disposition: Pending clinical course    Maria Isabel Draper PA-C  11/10/22  22:50 EST    ---------------------------------------------------------------------------------------------------------------------           Electronically signed by Miguel Licea MD at 11/10/22 2850

## 2022-11-18 NOTE — CASE MANAGEMENT/SOCIAL WORK
Continued Stay Note  [facility]     Patient Name: Malina Velasquez  MRN: [id number]  Today's Date: 11/18/2022    Admit Date: 11/10/2022       Discharge Plan     Row Name 11/18/22 2885       Plan    Plan CM spoke with Bijan Radford who states [facility] has been arranged and will begin services on Monday for weekly PICC line care and labs. Naomie BENNETT, SS, RN Kadi, patient and Nilsa. Patient voiced understanding. Nilsa will contact pt's spouse for home delivery of DME. [employer] is delivering wheelchair. Floor RN does not need to call report per Bijan Radford. No further needs. Planned dc home with IV abx and DME as previously arranged.    Patient/Family in Agreement with Plan yes  Discussed with patient.    Row Name 11/18/22 1036       Plan    Plan                Discharge Codes    No documentation.               Expected Discharge Date and Time     Expected Discharge Date Expected Discharge Time    Nov 18, 2022             Tabby Sebastian, RN

## 2022-11-18 NOTE — PLAN OF CARE
Goal Outcome Evaluation:      Pt resting with no complaints at this time. Pt awaiting discharge today. Pain meds given this shift.

## 2022-11-18 NOTE — PROGRESS NOTES
PROGRESS NOTE         Patient Identification:  Name:  Malina Velasquez  Age:  57 y.o.  Sex:  female  :  1965  MRN:  [id number]  Visit Number:  [id number]  Primary Care Provider:  Jeremiah Topete MD         LOS: 8 days       ----------------------------------------------------------------------------------------------------------------------  Subjective       Chief Complaints:    Weakness - Generalized (Pt states dr sent her here for high wbc and kidney function )        Interval History:      Patient is sitting up in bed on room air in no apparent distress.  She is very tearful this morning and very eager to go home soon.  Encouraged the patient to continue on antibiotic therapy, which will be necessary for healing of her right knee.  She voices understanding.  Continues to have a cough without production of sputum this morning.  Some rhonchi in the upper lobes, which cleared with coughing. Low-grade fever of 99.1 °F. No diarrhea.  WBC is slightly worsened at 15.45.    Review of Systems:    Constitutional: no fever, chills and night sweats.  Generalized fatigue.  Eyes: no eye drainage, itching or redness.  HEENT: no mouth sores, dysphagia or nose bleed.  Respiratory: Shortness of breath and nonproductive cough.  Cardiovascular: no chest pain, no palpitations, no orthopnea.  Gastrointestinal: no nausea, vomiting or diarrhea. No abdominal pain, hematemesis or rectal bleeding.  Genitourinary: no dysuria or polyuria.  Hematologic/lymphatic: no lymph node abnormalities, no easy bruising or easy bleeding.  Musculoskeletal: Right lower extremity pain.  Skin: No rash and no itching.  Neurological: no loss of consciousness, no seizure, no headache.  Behavioral/Psych: no depression or suicidal ideation.  Endocrine: no hot flashes.  Immunologic: negative.    ----------------------------------------------------------------------------------------------------------------------      Objective       Current  [facility] Meds:  bumetanide, 2 mg, Oral, Daily  fluticasone, 2 spray, Nasal, Daily  folic acid, 500 mcg, Oral, Daily  heparin (porcine), 5,000 Units, Subcutaneous, Q12H  levothyroxine, 100 mcg, Oral, Daily  loperamide, 2 mg, Oral, Once  metoprolol tartrate, 12.5 mg, Oral, Q12H  nystatin, 1 application, Topical, BID  oxyCODONE, 15 mg, Oral, 4x Daily  pantoprazole, 40 mg, Oral, QAM  sodium chloride, 10 mL, Intravenous, Q12H  sodium chloride, 10 mL, Intravenous, Q12H  sodium chloride, 10 mL, Intravenous, Q12H  sodium chloride, 10 mL, Intravenous, Q12H  vancomycin, 1,250 mg, Intravenous, Q24H  zinc oxide, , Topical, BID         ----------------------------------------------------------------------------------------------------------------------    Vital Signs:  Temp:  [97.9 °F (36.6 °C)-99.1 °F (37.3 °C)] 98.6 °F (37 °C)  Heart Rate:  [] 107  Resp:  [11-19] 18  BP: (105-129)/(63-81) 120/74  Mean Arterial Pressure (Non-Invasive) for the past 24 hrs (Last 3 readings):   Noninvasive MAP (mmHg)   11/17/22 1701 82   11/17/22 1600 83   11/17/22 1500 90     SpO2 Percentage    11/17/22 1701 11/17/22 1857 11/18/22 0612   SpO2: 95% 96% 93%     SpO2:  [92 %-96 %] 93 %  on   ;   Device (Oxygen Therapy): room air    Body mass index is 91.11 kg/m².  Wt Readings from Last 3 Encounters:   11/17/22 (!) 288 kg (635 lb)        Intake/Output Summary (Last 24 hours) at 11/18/2022 1035  Last data filed at 11/18/2022 0243  Gross per 24 hour   Intake 120 ml   Output 400 ml   Net -280 ml     Diet Regular Texture (IDDSI 7); Regular Consistency; Cardiac Diets, Renal Diets; Healthy Heart (2-3 Na+); Low Sodium (2-3g)  ----------------------------------------------------------------------------------------------------------------------      Physical Exam:    Constitutional:  female is sitting up in bed in no apparent distress.  Tearful.  HENT:  Head: Normocephalic and atraumatic.  Mouth:  Moist mucous membranes.    Eyes:  Conjunctivae  and EOM are normal.  No scleral icterus.  Neck:  Neck supple.  No JVD present.    Cardiovascular:  Normal rate, regular rhythm and normal heart sounds with no murmur. No edema.  Pulmonary/Chest:  No wheezes and no rales, with lungs clear to auscultation bilaterally.  There are some rhonchi initially, which cleared with coughing.  Abdominal:  Soft.  Bowel sounds are normal.  No distension and no tenderness.   Musculoskeletal: Bilateral lower extremity lymphedema.  Right lower extremity in Ace wrap this morning.  Neurological:  Alert and oriented to person, place, and time.  No facial droop.  No slurred speech.   Skin:  Skin is warm and dry.  No rash noted.  No pallor. Bilateral lower extremity lymphedema.    Psychiatric:  Normal affect.  Behavior is normal.  Tearful.      ----------------------------------------------------------------------------------------------------------------------  Results from last 7 days   Lab Units 11/16/22  0912 11/16/22  0506   TROPONIN T ng/mL 0.014 <0.010           Results from last 7 days   Lab Units 11/18/22  0107 11/17/22  0918 11/16/22  0012 11/13/22  0053 11/12/22  1015 11/12/22  0832 11/12/22  0433 11/11/22  1226   CRP mg/dL  --   --   --   --  14.42*  --   --   --    LACTATE mmol/L  --   --   --   --   --  3.7*  --  3.9*   WBC 10*3/mm3 15.45* 14.19* 12.16*   < >  --   --    < >  --    HEMOGLOBIN g/dL 8.4* 8.2* 8.4*   < >  --   --    < >  --    HEMATOCRIT % 25.9* 24.3* 25.1*   < >  --   --    < >  --    MCV fL 97.7* 96.4 96.2   < >  --   --    < >  --    MCHC g/dL 32.4 33.7 33.5   < >  --   --    < >  --    PLATELETS 10*3/mm3 76* 56* 42*   < >  --   --    < >  --     < > = values in this interval not displayed.     Results from last 7 days   Lab Units 11/18/22  0107 11/17/22  0918 11/17/22  0042 11/16/22  0012 11/15/22  0019 11/14/22  1435   SODIUM mmol/L 131* 132*  --  131* 130*  --    POTASSIUM mmol/L 4.2 4.1  --  3.8 3.8  --    MAGNESIUM mg/dL  --   --  1.9  --  1.5* 1.5*    CHLORIDE mmol/L 97* 97*  --  96* 97*  --    CO2 mmol/L 25.6 27.6  --  25.4 24.3  --    BUN mg/dL 34* 35*  --  40* 44*  --    CREATININE mg/dL 1.71* 1.87*  --  1.99* 2.26*  --    CALCIUM mg/dL 8.2* 7.9*  --  7.8* 7.6*  --    GLUCOSE mg/dL 109* 120*  --  143* 118*  --    Estimated Creatinine Clearance: 89.4 mL/min (A) (by C-G formula based on SCr of 1.71 mg/dL (H)).  No results found for: AMMONIA    No results found for: HGBA1C, POCGLU  Lab Results   Component Value Date    HGBA1C 4.70 (L) 11/10/2022     Lab Results   Component Value Date    TSH 0.960 11/10/2022       Blood Culture   Date Value Ref Range Status   11/10/2022 No growth at 24 hours  Preliminary   11/10/2022 No growth at 24 hours  Preliminary     No results found for: URINECX  No results found for: WOUNDCX  No results found for: STOOLCX  No results found for: RESPCX  Pain Management Panel     Pain Management Panel Latest Ref Rng & Units 11/11/2022    AMPHETAMINES SCREEN, URINE Negative Negative    BARBITURATES SCREEN Negative Negative    BENZODIAZEPINE SCREEN, URINE Negative Negative    BUPRENORPHINEUR Negative Negative    COCAINE SCREEN, URINE Negative Negative    METHADONE SCREEN, URINE Negative Negative    METHAMPHETAMINEUR Negative Negative            ----------------------------------------------------------------------------------------------------------------------  Imaging Results (Last 24 Hours)     ** No results found for the last 24 hours. **          ----------------------------------------------------------------------------------------------------------------------    Pertinent Infectious Disease Results    Lactic acid 5.0 on admission.  Procalcitonin 0.87.  Urinalysis unremarkable.  COVID-19 and influenza PCR negative.  Blood cultures from 11/10/2022 show no growth thus far.  CT of the abdomen pelvis from 11/10/2022 reports no acute intra-abdominal abnormality, mildly nodular contour of the liver may represent early cirrhosis, borderline  splenomegaly, CT of the chest from 11/10/2022 reports no acute cardiopulmonary disease, bibasilar subsegmental atelectasis, small hiatal hernia.  Right lower extremity CT reports advanced cellulitis of the right lower extremity extending from the level of the proximal thigh through the foot with maximum thickness of the inflammatory change and stranding medially measuring greater than 15 cm at the level of the distal thigh and knee, no drainable fluid collection no subcutaneous gas, no focal erosive changes to suggest osteomyelitis at this time. GI PCR negative.  Urinalysis from 11/11/2022 unremarkable.  Right lower extremity venous duplex negative for DVT.  Right lower extremity erythema improved but continues with significant edema.    Chest x-ray from 11/16/2022 reports improved aeration specifically of the left lung base, tiny bilateral pleural effusions.     Based on previous culture with MRSA from joint aspiration at outside facility recommend to continue vancomycin monotherapy.  Patient will require prolonged course of antibiotic therapy for chronic osteomyelitis of the right knee secondary to complication from recurrent partially treated right knee septic arthritis and will need prolonged course of IV antibiotic therapy x8 weeks followed by oral suppressive therapy with very grim prognosis regarding clearing the infection without repetitive washout and aspiration.  Per orthopedic surgery risk outweighs benefit for surgical intervention at this time.        Assessment/Plan       Assessment     Septic shock with lactic acid greater than 4 on admission  History of right knee pyogenic arthritis with MRSA with superimposed right knee chronic osteomyelitis  Right lower extremity cellulitis and lymphedema        Plan      I have seen and examined the patient myself this morning and discussed the plan of care with Tori Ramos PA-C and here are my findings:    Patient is overall stable but continues to complain  of pain tearful this morning and very eager to go home and very concerned about the possibility of going to nursing home.  Continues with cough that seems to be getting somewhat better with minimal production of clear sputum.  Very fine transmitted rhonchi in upper lobes but no wheezing or crackles and her rhonchi clears after coughing.    Patient has a low-grade fever 99.1 °F but no diarrhea noted.    WBC slightly worsened at 15.45 as patient remains on IV vancomycin for an 8-week course followed by suppressive therapy afterwards for very difficult septic knee arthritis secondary to MRSA.  Daptomycin would be an appropriate choice as well if easier for outpatient administration and if used to initiate checking CPK levels weekly and avoid statin therapy.    Code Status:   Code Status and Medical Interventions:   Ordered at: 11/10/22 1939     Level Of Support Discussed With:    Patient     Code Status (Patient has no pulse and is not breathing):    CPR (Attempt to Resuscitate)     Medical Interventions (Patient has pulse or is breathing):    Full Support       Tori Ramos PA-C  11/18/22  10:35 EST     Electronically signed by Tori Ramos PA-C, 11/18/22, 10:40 AM EST.      Electronically signed by Kane Winter MD, 11/18/22, 11:15 AM EST.

## 2022-11-18 NOTE — CASE MANAGEMENT/SOCIAL WORK
Discharge Planning Assessment   [facility]     Patient Name: Malina Velasquez  MRN: [id number]  Today's Date: 11/18/2022    Admit Date: 11/10/2022    Plan: CM spoke with Bijan Radford who states [facility] has been arranged and will begin services on Monday for weekly PICC line care and labs. Naomie BENNETT, SS, patient and Nilsa. Nilsa will contact pt's spouse for home delivery of DME. [facility] is delivering wheelchair. Floor RN does not need to call report per Bijan Radford. No further needs. Planned dc home with IV abx and DME as previously arranged.   Discharge Plan     Row Name 11/18/22 1519       Plan    Final Discharge Disposition Code 01 - home or self-care    Final Note Pt is being discharged home on this date. SS spoke with pt at bedside on this date who states her wheelchair has been delivered and states her hospital bed is supposed to be delivered by 3:30. Pt states she will need ems transportation to be arranged via [facility]. SS to contact EMS for transportation when ready. Per DULCE, RN does not have to call report to [facility]. SS to arrange transportation. No other needs identified at this time.                         Krysten Swenson, CAROLINEW

## 2022-11-18 NOTE — CASE MANAGEMENT/SOCIAL WORK
Continued Stay Note  [facility] Donnelly     Patient Name: Malina Velasquez  MRN: [id number]  Today's Date: 11/18/2022    Admit Date: 11/10/2022       Discharge Plan     Row Name 11/18/22 0815       Plan    Plan Patient has been transferred to /S from PCU. Faxed PICC line information to [facility]. SS/CM following.               Discharge Codes    No documentation.               Expected Discharge Date and Time     Expected Discharge Date Expected Discharge Time    Nov 17, 2022             Tabby Sebastian RN

## 2022-11-18 NOTE — CASE MANAGEMENT/SOCIAL WORK
11/17/22 1430: Referral for home IV Vanc received & faxed to [facility] Referral for DME received & faxed to Nilsa pt preference. Spoke with Mary, DME will be delivered upon pt dc home. Bariatric wheelchair is on backorder and will be available Jan.2023. SS will arrange HH. SS/CM following.

## 2022-11-18 NOTE — CASE MANAGEMENT/SOCIAL WORK
Continued Stay Note  [facility] ROS[facility] Donnelly     Patient Name: Malina Velasquez  MRN: [id number]  Today's Date: 11/18/2022    Admit Date: 11/10/2022       Discharge Plan     Row Name 11/18/22 0943       Plan    Plan Due to [facility] not able to get a Bariatric wheelchair until Jan. 2023, contacted [facility] and they do have a 32 inch in stock and can deliver wheelchair to the home. CM spoke with patient and she voiced agreement. Referral faxed to [facility] [phone]. Spoke with Stephanie and provided with pt current address [address] [place] [address] and updated phone numbers. Patient's cell [phone] and Ant, 's/home [phone]. Notified Mary @ [facility] the wheelchair referral was faxed to [facility]. She voiced understanding.  SS/CM continuing to follow and assist as needed.    Patient/Family in Agreement with Plan yes  Confirmed with patient via phone call.    Row Name 11/18/22 0815       Plan    Plan                Discharge Codes    No documentation.               Expected Discharge Date and Time     Expected Discharge Date Expected Discharge Time    Nov 18, 2022             Tabby Sebastian RN

## 2022-11-18 NOTE — PLAN OF CARE
Goal Outcome Evaluation:  Plan of Care Reviewed With: patient        Progress: improving    Pt c/o pain x2, prn and scheduled pain med given. Still has non-productive cough, tessalon pearls and robittussin given. No acute changes, will continue with current POC.

## 2022-11-18 NOTE — CASE MANAGEMENT/SOCIAL WORK
Continued Stay Note  ANA ROS[facility] Donnelly     Patient Name: Malina Velasquez  MRN: [id number]  Today's Date: 11/18/2022    Admit Date: 11/10/2022       Discharge Plan     Row Name 11/18/22 1032       Plan    Plan CM spoke with Malina, [facility] regarding SS continued efforts to arrange Home Health without success. She will check with a Nursing agency to see if they will accept and assist and call back when she has an answer. Will follow.    Row Name 11/18/22 0943       Plan    Plan     Patient/Family in Agreement with Plan     Row Name 11/18/22 0815       Plan    Plan                Discharge Codes    No documentation.               Expected Discharge Date and Time     Expected Discharge Date Expected Discharge Time    Nov 18, 2022             Tabby Sebastian RN

## 2022-11-19 NOTE — OUTREACH NOTE
Prep Survey    Flowsheet Row Responses   [facility] facility patient discharged from? [facility] [facility]   Is LACE score < 7 ? No   Emergency Room discharge w/ pulse ox? No   Eligibility Readm Mgmt   Discharge diagnosis Acute on chronic renal failure   Does the patient have one of the following disease processes/diagnoses(primary or secondary)? Other   Does the patient have Home health ordered? Yes   What is the Home health agency?  [facility]   Is there a DME ordered? Yes   What DME was ordered? hospital bed and wheelchair   Medication alerts for this patient see AVS   Prep survey completed? Yes          FAHEEM PADILLA - Registered Nurse

## 2022-11-21 ENCOUNTER — READMISSION MANAGEMENT (OUTPATIENT)
Dept: CALL CENTER | Facility: HOSPITAL | Age: 57
End: 2022-11-21

## 2022-11-21 NOTE — OUTREACH NOTE
Medical Week 1 Survey    Flowsheet Row Responses   [facility] patient discharged from? [facility]   Does the patient have one of the following disease processes/diagnoses(primary or secondary)? Other   Week 1 attempt successful? Yes   Call start time 1613   Call end time 1627   Discharge diagnosis Acute on chronic renal failure   Meds reviewed with patient/caregiver? Yes   Is the patient having any side effects they believe may be caused by any medication additions or changes? No   Does the patient have all medications ordered at discharge? Yes   Is the patient taking all medications as directed (includes completed medication regime)? Yes   Does the patient have a primary care provider?  Yes   Does the patient have an appointment with their PCP within 7 days of discharge? Yes   Comments regarding PCP 11/29/22   Has the patient kept scheduled appointments due by today? N/A   What is the [facility] health agency?  [facility]   Has home health visited the patient within 72 hours of discharge? Yes   What DME was ordered? hospital bed and wheelchair   Has all DME been delivered? Yes   Psychosocial issues? No   Did the patient receive a copy of their discharge instructions? Yes   Nursing interventions Reviewed instructions with patient   What is the patient's perception of their health status since discharge? Same   Is the patient/caregiver able to teach back the hierarchy of who to call/visit for symptoms/problems? PCP, Specialist, Home health nurse, Urgent Care, ED, 911 Yes   Week 1 call completed? Yes   Is the patient interested in additional calls from an ambulatory ?  NOTE:  applies to high risk patients requiring additional follow-up. No          MARCUS RAMON - Registered Nurse

## 2022-12-01 ENCOUNTER — READMISSION MANAGEMENT (OUTPATIENT)
Dept: CALL CENTER | Facility: HOSPITAL | Age: 57
End: 2022-12-01
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
## 2022-12-06 ENCOUNTER — OFFICE VISIT (OUTPATIENT)
Dept: INFECTIOUS DISEASES | Facility: CLINIC | Age: 57
End: 2022-12-06

## 2022-12-06 DIAGNOSIS — M86.9: ICD-10-CM

## 2022-12-06 DIAGNOSIS — M00.061 STAPHYLOCOCCAL ARTHRITIS OF RIGHT KNEE: Primary | ICD-10-CM

## 2022-12-06 DIAGNOSIS — I89.0 CHRONIC ACQUIRED LYMPHEDEMA: ICD-10-CM

## 2022-12-06 DIAGNOSIS — A49.02 MRSA (METHICILLIN RESISTANT STAPHYLOCOCCUS AUREUS) INFECTION: ICD-10-CM

## 2022-12-06 DIAGNOSIS — N17.9 ACUTE KIDNEY INJURY: ICD-10-CM

## 2022-12-06 PROCEDURE — 99214 OFFICE O/P EST MOD 30 MIN: CPT | Performed by: INTERNAL MEDICINE

## 2022-12-06 NOTE — PROGRESS NOTES
[facility]         Referring Provider: Michael Roman, DO  [address]  [address]  [place] [address]    Subjective      Chief Complaint  No chief complaint on file.    History of Present Illness  Malina Velasquez is a 57 y.o. female who presents today to [facility] for Hospital Follow Up . Past medical history is significant for bilateral lower extremity chronic lymphedema with complicated history of chronic right knee septic arthritis and osteomyelitis status post multiple courses of IV antibiotic therapy and was discharged home on vancomycin or daptomycin to finish her course of 8 weeks of IV antibiotic therapy followed by long-term and possibly lifetime chronic suppressive therapy with oral doxycycline.    Patient returns to the clinic after she was recently admitted to the [facility], [place], operated by [facility] for dehydration and acute kidney injury and received IV fluids with resolution of her acute kidney injury.    Patient had blood work yesterday but results are not available and we are in the process of getting them.  Patient denies having any fever chills or night sweats and her right knee is overall significantly improved with complete resolution of erythema to both lower extremities and persistent severe edema and lymphedema.    Patient denies having any diarrhea and has been monitoring her urine output with significant improvement overall.    Past Medical History:   Diagnosis Date   • CHF (congestive heart failure) ([facility])    • COPD (chronic obstructive pulmonary disease) (Formerly [facility])    • Edema    • Hep C w/o coma, chronic ([facility]ly [facility])    • Hypertension    • Mitral valve prolapse        Past Surgical History:   Procedure Laterality Date   • GASTRIC BANDING     • JOINT ASPIRATION AND/OR INJECTION Right 07/2022       Social History     Socioeconomic History   • Marital status:    Tobacco Use   • Smoking status: Never   Vaping Use   • Vaping Use: Never used   Substance  and Sexual Activity   • Alcohol use: Not Currently   • Drug use: Never   • Sexual activity: Defer       Family History  family history includes Breast cancer in her mother; COPD in her father; Hypertension in her mother.      There is no immunization history on file for this patient.     Allergies  Allergies   Allergen Reactions   • Sulfa Antibiotics Rash   • Nsaids Swelling   • Penicillins Rash       The medication list has been reviewed and updated.   Current Medications    Current Outpatient Medications:   •  benzonatate (TESSALON) 100 MG capsule, Take 1 capsule by mouth Every 4 (Four) Hours As Needed for Cough for up to 20 doses., Disp: 20 capsule, Rfl: 0  •  bumetanide (BUMEX) 2 MG tablet, Take 1 tablet by mouth Daily., Disp: , Rfl:   •  cetirizine (zyrTEC) 10 MG tablet, Take 1 tablet by mouth 2 (Two) Times a Day As Needed for Allergies., Disp: , Rfl:   •  diphenhydrAMINE (BENADRYL) 25 mg capsule, Take 1 capsule by mouth At Night As Needed for Sleep. Prior to [facility] Admission, Patient was on:  1 to 2 caps at bedtime as needed, Disp: , Rfl:   •  docusate sodium (COLACE) 100 MG capsule, Take 1 capsule by mouth 2 (Two) Times a Day As Needed for Constipation., Disp: , Rfl:   •  fluticasone (FLONASE) 50 MCG/ACT nasal spray, 2 sprays into the nostril(s) as directed by provider Daily., Disp: , Rfl:   •  folic acid (FOLVITE) 1 MG tablet, Take 1/2 tablet by mouth Daily for 30 days., Disp: 15 tablet, Rfl: 0  •  hydrOXYzine (ATARAX) 50 MG tablet, Take 1 tablet by mouth 3 (Three) Times a Day As Needed for Anxiety., Disp: , Rfl:   •  levothyroxine (SYNTHROID, LEVOTHROID) 100 MCG tablet, Take 1 tablet by mouth Daily., Disp: , Rfl:   •  metoprolol tartrate (LOPRESSOR) 50 MG tablet, Take 12.5 mg by mouth 2 (Two) Times a Day As Needed. Prior to [facility] Admission, Patient was on:  if heart rate greater than 100, Disp: , Rfl:   •  nystatin (MYCOSTATIN) 855798 UNIT/GM cream, Apply 1 application topically to the appropriate area  as directed 2 (Two) Times a Day. Prior to [facility] Admission, Patient was on:  Mixes with Zinc oxide, uses on stomach folds, Disp: , Rfl:   •  nystatin (MYCOSTATIN) 987037 UNIT/GM powder, Apply 1 application topically to the appropriate area as directed 2 (Two) Times a Day As Needed. Prior to [facility] Admission, Patient was on:  stomach folds, Disp: , Rfl:   •  omeprazole (priLOSEC) 40 MG capsule, Take 1 capsule by mouth Daily., Disp: , Rfl:   •  oxyCODONE (ROXICODONE) 10 MG tablet, Take 1 tablet by mouth 3 (Three) Times a Day As Needed for Moderate Pain for up to 10 doses., Disp: 10 tablet, Rfl: 0  •  oxyCODONE (ROXICODONE) 15 MG immediate release tablet, Take 1 tablet by mouth 4 (Four) Times a Day., Disp: , Rfl:   •  oxyCODONE (ROXICODONE) 5 MG immediate release tablet, Take 1 tablet by mouth 2 (Two) Times a Day As Needed for Moderate Pain. Prior to [facility] Admission, Patient was on:  breakthrough pain, Disp: , Rfl:   •  traZODone (DESYREL) 50 MG tablet, Take 1 tablet by mouth At Night As Needed for Sleep. Prior to [facility] Admission, Patient was on:  1 to 2 tabs at bedtime as needed, Disp: , Rfl:   •  vancomycin 1250 mg/250 mL 0.9% NS IVPB ([facility]), Infuse 250 mL into a venous catheter Daily for 50 doses. Infusion pharmacy to manage and adjust dose as necessary for weekly bmp/renal function  Indications: Infection with Dysregulated Inflammatory Response, Disp: 7500 mL, Rfl: 1  •  zinc oxide 13 % cream cream, Apply 1 application topically to the appropriate area as directed 2 (Two) Times a Day. Prior to [facility] Admission, Patient was on:  mixes with Nystatin, uses on stomach folds., Disp: , Rfl:       Review of Systems    Review of Systems   Constitutional: Positive for fatigue. Negative for activity change, appetite change, chills, diaphoresis and fever.   HENT: Negative for congestion, dental problem, drooling, ear discharge, ear pain, facial swelling, postnasal drip, sinus pressure, sore throat and swollen glands.   "  Eyes: Negative for blurred vision, double vision, pain, discharge and itching.   Respiratory: Negative for apnea, cough, choking, chest tightness and shortness of breath.    Cardiovascular: Positive for leg swelling. Negative for chest pain and palpitations.   Gastrointestinal: Negative for abdominal distention, abdominal pain, diarrhea, nausea, rectal pain and vomiting.   Genitourinary: Negative for difficulty urinating, dysuria, flank pain, frequency, hematuria, pelvic pain and pelvic pressure.   Musculoskeletal:        Bilateral lower extremity chronic lymphedema with severe edema   Neurological: Negative for dizziness, tremors, seizures, syncope, speech difficulty and confusion.   Psychiatric/Behavioral: Negative for agitation and behavioral problems.        Objective     Vital Signs:  There were no vitals taken for this visit.  Estimated body mass index is 91.11 kg/m² as calculated from the following:    Height as of 11/10/22: 177.8 cm (70\").    Weight as of 11/17/22: 288 kg (635 lb).    Physical Exam  Constitutional:       General: She is not in acute distress.     Appearance: Normal appearance. She is not ill-appearing, toxic-appearing or diaphoretic.   HENT:      Head: Normocephalic and atraumatic.      Nose: No congestion or rhinorrhea.      Mouth/Throat:      Pharynx: Oropharynx is clear. No oropharyngeal exudate or posterior oropharyngeal erythema.   Cardiovascular:      Rate and Rhythm: Normal rate and regular rhythm.      Heart sounds: No murmur heard.  Pulmonary:      Effort: No respiratory distress.      Breath sounds: No stridor. No wheezing, rhonchi or rales.   Chest:      Chest wall: No tenderness.   Abdominal:      General: There is distension.      Tenderness: There is no abdominal tenderness. There is no right CVA tenderness, left CVA tenderness, guarding or rebound.   Musculoskeletal:         General: Swelling and tenderness present. No signs of injury.      Cervical back: No rigidity or " tenderness.      Right lower leg: Edema present.      Left lower leg: Edema present.   Skin:     Coloration: Skin is not jaundiced or pale.      Findings: Erythema present. No bruising or rash.      Comments: Bilateral lower extremity chronic venous dermatitis with associated lymphedema and it seems that the right knee has overall significantly improved with complete resolution of erythema and warmth which is a significant improvement from pain at this time.  Left lower extremity is more swollen but without any evidence of warmth or collection.   Neurological:      General: No focal deficit present.      Mental Status: She is alert. Mental status is at baseline.   Psychiatric:         Mood and Affect: Mood normal.         Behavior: Behavior normal.          Result Review :  The following data was reviewed by Kane Winter MD     Lab Results  Lab Results   Component Value Date    WBC 15.08 (H) 11/18/2022    HGB 8.2 (L) 11/18/2022    HCT 25.5 (L) 11/18/2022    MCV 98.8 (H) 11/18/2022    PLT 83 (L) 11/18/2022     Lab Results   Component Value Date    GLUCOSE 109 (H) 11/18/2022    BUN 34 (H) 11/18/2022    CREATININE 1.71 (H) 11/18/2022    BCR 19.9 11/18/2022    K 4.2 11/18/2022    CO2 25.6 11/18/2022    CALCIUM 8.2 (L) 11/18/2022    ALBUMIN 1.58 (L) 11/11/2022    AST 34 (H) 11/11/2022    ALT 10 11/11/2022      Lab Results   Component Value Date    CRP 14.42 (H) 11/12/2022        No results found for: ACANTHNAEG, AFBCX, BPERTUSSISCX, BLOODCX  No results found for: BCIDPCR, CXREFLEX, CSFCX, CULTURETIS  No results found for: CULTURES, HSVCX, URCX  No results found for: EYECULTURE, GCCX, HSVCULTURE, LABHSV  No results found for: LEGIONELLA, MRSACX, MUMPSCX, MYCOPLASCX  No results found for: NOCARDIACX, STOOLCX  No results found for: THROATCX, UNSTIMCULT, URINECX, CULTURE, VZVCULTUR  No results found for: VIRALCULTU, WOUNDCX    Radiology Results  CT Abdomen Pelvis Without Contrast    Result Date:  11/10/2022  Impression: 1.  Exam is limited by lack of intravenous contrast and patient body habitus. 2.  No acute intra-abdominal abnormality. 3.  Mildly nodular contour of the liver which may reflect early cirrhosis. Recommend correlation with liver function tests. 4.  Borderline splenomegaly. Signer Name: Mark Bird MD  Signed: 11/10/2022 5:17 PM  Workstation Name: [id number]  Radiology [facility]    CT Chest Without Contrast Diagnostic    Result Date: 11/10/2022  Impression: 1.  No acute cardiopulmonary disease. 2.  Bibasilar subsegmental atelectasis. 3.  Small hiatal hernia. Signer Name: Mark Bird MD  Signed: 11/10/2022 5:20 PM  Workstation Name: [id number]  Radiology [facility]    US Abdomen Complete    Result Date: 11/16/2022  Impression:   The spleen measures 13.1 cm in maximum dimension. Probably within normal limits for size.  This report was finalized on 11/16/2022 8:09 AM by Dr. Ryan Whitney MD.      XR Chest 1 View    Result Date: 11/16/2022  Impression: 1.  Some improved aeration specifically of the left lung base. 2.  Tiny bilateral pleural effusions.  This report was finalized on 11/16/2022 8:08 AM by Dr. Ryan Whitney MD.      XR Chest 1 View    Result Date: 11/10/2022  Impression: 1.  Cardiomegaly and pulmonary vascular congestion. 2.  Right basilar airspace disease.  This report was finalized on 11/10/2022 4:15 PM by Dr. Carlos Manuel King MD.      US Venous Doppler Lower Extremity Right (duplex)    Result Date: 11/11/2022  Impression: No DVT in the right lower extremity on today's exam.  This report was finalized on 11/11/2022 9:58 AM by Dr. Drew Cm MD.      CT Lower Extremity Right Without Contrast    Result Date: 11/10/2022  Impression: 1. Imaging findings most likely reflecting advanced cellulitis of the right lower extremity extending from the level of the proximal thigh through the foot with maximum thickness of the inflammatory change and stranding medially  measuring greater than 15 cm at the level of the distal thigh and knee. No distinct drainable fluid collection. No subcutaneous gas. No focal erosive changes to suggest osteomyelitis at this time. If there is persistent clinical concern for osteomyelitis, MRI could be performed for further evaluation. Signer Name: Faustino Lima MD  Signed: 11/10/2022 11:26 PM  Workstation Name: [id number]  [facility]             Assessment / Plan         Assessment   Diagnoses and all orders for this visit:    1. Staphylococcal arthritis of right knee (HCC) (Primary)  -     Ambulatory Referral to Specialty Pharmacy  -     CBC & Differential; Future  -     Comprehensive Metabolic Panel; Future  -     CK; Future  -     C-reactive Protein; Future    2. Knee osteomyelits, right (HCC)    3. MRSA (methicillin resistant Staphylococcus aureus) infection    4. Acute kidney injury (HCC)    5. Chronic acquired lymphedema         This is a very complicated case with chronic right knee septic arthritis and osteomyelitis with multiple attempts for clearing her infection and it seems that she has done great on IV vancomycin except for an episode of acute kidney injury that was related to dehydration and per patient's account her kidney function is back to normal and pharmacy is following up on her vancomycin trough levels.    If patient develops any further acute kidney injury and if it is thought to be related to vancomycin, daptomycin IV would be an adequate option with less need for monitoring except for weekly CPK levels and avoiding statins.    Would recommend to continue with IV vancomycin or IV daptomycin to complete her 8-week course and to follow with oral doxycycline chronic/long-term suppressive therapy as patient is going to be at a very high risk for relapse.    I received the results back and her creatinine has been ranging between 3.7 and 4.1 and I do not believe it would be a good option to continue with  vancomycin at this time.    I put in a referral for pharmacy to discontinue vancomycin and initiate daptomycin 6 mg/kg IV every 48 hours as long as her GFR is less than 30 and to follow-up with a CPK level CBC and BMP in 4 days.  Follow-up with me on Tuesday.    Unfortunately patient came to the office with no medication list and without her lab results that were drawn yesterday and we are in the process of getting them at this time.      I spent 50 minutes caring for Malina on this date of service. This time includes time spent by me in the following activities:reviewing tests, obtaining and/or reviewing a separately obtained history, performing a medically appropriate examination and/or evaluation , referring and communicating with other health care professionals , documenting information in the medical record, independently interpreting results and communicating that information with the patient/family/caregiver and care coordination    Follow Up   Return in about 1 week (around 12/13/2022) for with Dr. Winter.    Visit Diagnoses:    ICD-10-CM ICD-9-CM   1. Staphylococcal arthritis of right knee ([facility])  M00.061 711.06     041.10   2. Knee osteomyelits, right ([facility])  M86.9 730.26   3. MRSA (methicillin resistant Staphylococcus aureus) infection  A49.02 041.12   4. Acute kidney injury (HCC)  N17.9 584.9   5. Chronic acquired lymphedema  I89.0 457.1       Patient was given instructions and counseling regarding her condition or for health maintenance advice. Please see specific information pulled into the AVS if appropriate.     This document has been electronically signed by Kane Winter MD   December 6, 2022 10:09 EST    Dictated Utilizing Dragon Dictation: Part of this note may be an electronic transcription/translation of spoken language to printed text using the Dragon Dictation System.

## 2022-12-06 NOTE — PROGRESS NOTES
Daptomycin was dosed at 6mg/kg (adjusted BW) q24h (as pt CrCl > 30 ml/min). Most recent SCr=3.7 on 12/4/22 based on labs [facility]ed [facility]. Spoke to Narciso Overton ([phone]) at home infusion pharmacy and she reports patient will need to receive 1 dose in a monitored setting before they can send it to her for home infusion. I spoke to pt who thought she may've received it at [facility] so I called there but they reported she did not receive Dapto. Appt with [facility] scheduled for tomorrow at 1100, Gianna called EMS who confirmed they will  pt at 1000, and Gianna informed patient as well. I gave Narciso Overton at infusion pharmacy a verbal order for Daptomycin 950mg IV q24h x 4 weeks with repeat CPK/CBC/BMP in 4 days and to discontinue Vancomycin, and she read back to verify.    Thank you,  Becky Durant, PharmD

## 2022-12-07 ENCOUNTER — INFUSION (OUTPATIENT)
Dept: ONCOLOGY | Facility: HOSPITAL | Age: 57
End: 2022-12-07

## 2022-12-07 VITALS
OXYGEN SATURATION: 97 % | SYSTOLIC BLOOD PRESSURE: 114 MMHG | RESPIRATION RATE: 18 BRPM | DIASTOLIC BLOOD PRESSURE: 65 MMHG | TEMPERATURE: 97.8 F | HEART RATE: 54 BPM

## 2022-12-07 DIAGNOSIS — M00.061 STAPHYLOCOCCAL ARTHRITIS OF RIGHT KNEE: Primary | ICD-10-CM

## 2022-12-07 PROCEDURE — 96365 THER/PROPH/DIAG IV INF INIT: CPT

## 2022-12-07 PROCEDURE — 25010000002 DAPTOMYCIN PER 1 MG: Performed by: INTERNAL MEDICINE

## 2022-12-07 RX ADMIN — DAPTOMYCIN 950 MG: 500 INJECTION, POWDER, LYOPHILIZED, FOR SOLUTION INTRAVENOUS at 12:02

## 2022-12-09 ENCOUNTER — READMISSION MANAGEMENT (OUTPATIENT)
Dept: CALL CENTER | Facility: HOSPITAL | Age: 57
End: 2022-12-09

## 2022-12-09 NOTE — OUTREACH NOTE
Medical Week 3 Survey    Flowsheet Row Responses   [facility] patient discharged from? [facility]   Does the patient have one of the following disease processes/diagnoses(primary or secondary)? Other   Week 3 attempt successful? Yes   Call start time 1513   Call end time 1521   Discharge diagnosis Acute on chronic renal failure   Meds reviewed with patient/caregiver? Yes   Is the patient taking all medications as directed (includes completed medication regime)? Yes   Medication comments No longer taking Vanc has changed to another ABT    What is the Home health agency?  Has infusion nurse that comes in to care for picc and labs   Has home health visited the patient within 72 hours of discharge? Yes   What DME was ordered? hospital bed and wheelchair   Psychosocial issues? No   Did the patient receive a copy of their discharge instructions? Yes   Nursing interventions Reviewed instructions with patient   What is the patient's perception of their health status since discharge? New symptoms unrelated to diagnosis  [Pt reports she had Renal failure again and redness to legs again. States her ID is aware. ]   Is the patient/caregiver able to teach back signs and symptoms related to disease process for when to call PCP? Yes   Is the patient/caregiver able to teach back signs and symptoms related to disease process for when to call 911? Yes   Is the patient/caregiver able to teach back the hierarchy of who to call/visit for symptoms/problems? PCP, Specialist, Home health nurse, Urgent Care, ED, 911 Yes   Week 3 Call Completed? Yes   Wrap up additional comments Pt reports that she was better but now she had to change ABT and is on a new IV ABT. Pt states that her legs became red again and she had renal failure again. Pt is working with Her Liz WINKLER - Registered Nurse